# Patient Record
Sex: MALE | Race: WHITE | NOT HISPANIC OR LATINO
[De-identification: names, ages, dates, MRNs, and addresses within clinical notes are randomized per-mention and may not be internally consistent; named-entity substitution may affect disease eponyms.]

---

## 2018-12-06 ENCOUNTER — OTHER (OUTPATIENT)
Age: 68
End: 2018-12-06

## 2019-06-26 ENCOUNTER — TRANSCRIPTION ENCOUNTER (OUTPATIENT)
Age: 69
End: 2019-06-26

## 2019-06-26 ENCOUNTER — OUTPATIENT (OUTPATIENT)
Dept: OUTPATIENT SERVICES | Facility: HOSPITAL | Age: 69
LOS: 1 days | End: 2019-06-26
Payer: MEDICARE

## 2019-06-26 DIAGNOSIS — R07.89 OTHER CHEST PAIN: ICD-10-CM

## 2019-06-26 PROCEDURE — 93018 CV STRESS TEST I&R ONLY: CPT

## 2019-06-26 PROCEDURE — 93016 CV STRESS TEST SUPVJ ONLY: CPT

## 2020-07-31 ENCOUNTER — EMERGENCY (EMERGENCY)
Facility: HOSPITAL | Age: 70
LOS: 1 days | Discharge: HOME | End: 2020-07-31
Attending: EMERGENCY MEDICINE
Payer: COMMERCIAL

## 2020-07-31 VITALS
HEIGHT: 70 IN | SYSTOLIC BLOOD PRESSURE: 169 MMHG | TEMPERATURE: 97 F | HEART RATE: 54 BPM | RESPIRATION RATE: 20 BRPM | DIASTOLIC BLOOD PRESSURE: 75 MMHG | WEIGHT: 175.05 LBS | OXYGEN SATURATION: 97 %

## 2020-07-31 LAB
ALBUMIN SERPL ELPH-MCNC: 4.3 G/DL — SIGNIFICANT CHANGE UP (ref 3.3–5.2)
ALP SERPL-CCNC: 81 U/L — SIGNIFICANT CHANGE UP (ref 40–120)
ALT FLD-CCNC: 13 U/L — SIGNIFICANT CHANGE UP
ANION GAP SERPL CALC-SCNC: 11 MMOL/L — SIGNIFICANT CHANGE UP (ref 5–17)
APPEARANCE UR: CLEAR — SIGNIFICANT CHANGE UP
AST SERPL-CCNC: 18 U/L — SIGNIFICANT CHANGE UP
BASOPHILS # BLD AUTO: 0.05 K/UL — SIGNIFICANT CHANGE UP (ref 0–0.2)
BASOPHILS NFR BLD AUTO: 0.6 % — SIGNIFICANT CHANGE UP (ref 0–2)
BILIRUB SERPL-MCNC: 0.7 MG/DL — SIGNIFICANT CHANGE UP (ref 0.4–2)
BILIRUB UR-MCNC: NEGATIVE — SIGNIFICANT CHANGE UP
BUN SERPL-MCNC: 18 MG/DL — SIGNIFICANT CHANGE UP (ref 8–20)
CALCIUM SERPL-MCNC: 9.4 MG/DL — SIGNIFICANT CHANGE UP (ref 8.6–10.2)
CHLORIDE SERPL-SCNC: 104 MMOL/L — SIGNIFICANT CHANGE UP (ref 98–107)
CO2 SERPL-SCNC: 28 MMOL/L — SIGNIFICANT CHANGE UP (ref 22–29)
COLOR SPEC: YELLOW — SIGNIFICANT CHANGE UP
CREAT SERPL-MCNC: 1.05 MG/DL — SIGNIFICANT CHANGE UP (ref 0.5–1.3)
DIFF PNL FLD: NEGATIVE — SIGNIFICANT CHANGE UP
EOSINOPHIL # BLD AUTO: 0.21 K/UL — SIGNIFICANT CHANGE UP (ref 0–0.5)
EOSINOPHIL NFR BLD AUTO: 2.6 % — SIGNIFICANT CHANGE UP (ref 0–6)
ETHANOL SERPL-MCNC: <10 MG/DL — SIGNIFICANT CHANGE UP
GLUCOSE SERPL-MCNC: 114 MG/DL — HIGH (ref 70–99)
GLUCOSE UR QL: NEGATIVE MG/DL — SIGNIFICANT CHANGE UP
HCT VFR BLD CALC: 40.7 % — SIGNIFICANT CHANGE UP (ref 39–50)
HGB BLD-MCNC: 13.6 G/DL — SIGNIFICANT CHANGE UP (ref 13–17)
IMM GRANULOCYTES NFR BLD AUTO: 0.4 % — SIGNIFICANT CHANGE UP (ref 0–1.5)
KETONES UR-MCNC: NEGATIVE — SIGNIFICANT CHANGE UP
LEUKOCYTE ESTERASE UR-ACNC: NEGATIVE — SIGNIFICANT CHANGE UP
LYMPHOCYTES # BLD AUTO: 1.33 K/UL — SIGNIFICANT CHANGE UP (ref 1–3.3)
LYMPHOCYTES # BLD AUTO: 16.6 % — SIGNIFICANT CHANGE UP (ref 13–44)
MAGNESIUM SERPL-MCNC: 2.1 MG/DL — SIGNIFICANT CHANGE UP (ref 1.6–2.6)
MCHC RBC-ENTMCNC: 30.7 PG — SIGNIFICANT CHANGE UP (ref 27–34)
MCHC RBC-ENTMCNC: 33.4 GM/DL — SIGNIFICANT CHANGE UP (ref 32–36)
MCV RBC AUTO: 91.9 FL — SIGNIFICANT CHANGE UP (ref 80–100)
MONOCYTES # BLD AUTO: 0.83 K/UL — SIGNIFICANT CHANGE UP (ref 0–0.9)
MONOCYTES NFR BLD AUTO: 10.3 % — SIGNIFICANT CHANGE UP (ref 2–14)
NEUTROPHILS # BLD AUTO: 5.57 K/UL — SIGNIFICANT CHANGE UP (ref 1.8–7.4)
NEUTROPHILS NFR BLD AUTO: 69.5 % — SIGNIFICANT CHANGE UP (ref 43–77)
NITRITE UR-MCNC: NEGATIVE — SIGNIFICANT CHANGE UP
NT-PROBNP SERPL-SCNC: 146 PG/ML — SIGNIFICANT CHANGE UP (ref 0–300)
PH UR: 7 — SIGNIFICANT CHANGE UP (ref 5–8)
PLATELET # BLD AUTO: 255 K/UL — SIGNIFICANT CHANGE UP (ref 150–400)
POTASSIUM SERPL-MCNC: 4.2 MMOL/L — SIGNIFICANT CHANGE UP (ref 3.5–5.3)
POTASSIUM SERPL-SCNC: 4.2 MMOL/L — SIGNIFICANT CHANGE UP (ref 3.5–5.3)
PROT SERPL-MCNC: 6.9 G/DL — SIGNIFICANT CHANGE UP (ref 6.6–8.7)
PROT UR-MCNC: NEGATIVE MG/DL — SIGNIFICANT CHANGE UP
RBC # BLD: 4.43 M/UL — SIGNIFICANT CHANGE UP (ref 4.2–5.8)
RBC # FLD: 13 % — SIGNIFICANT CHANGE UP (ref 10.3–14.5)
SODIUM SERPL-SCNC: 142 MMOL/L — SIGNIFICANT CHANGE UP (ref 135–145)
SP GR SPEC: 1.01 — SIGNIFICANT CHANGE UP (ref 1.01–1.02)
TROPONIN T SERPL-MCNC: <0.01 NG/ML — SIGNIFICANT CHANGE UP (ref 0–0.06)
TROPONIN T SERPL-MCNC: <0.01 NG/ML — SIGNIFICANT CHANGE UP (ref 0–0.06)
TSH SERPL-MCNC: 1.59 UIU/ML — SIGNIFICANT CHANGE UP (ref 0.27–4.2)
UROBILINOGEN FLD QL: NEGATIVE MG/DL — SIGNIFICANT CHANGE UP
WBC # BLD: 8.02 K/UL — SIGNIFICANT CHANGE UP (ref 3.8–10.5)
WBC # FLD AUTO: 8.02 K/UL — SIGNIFICANT CHANGE UP (ref 3.8–10.5)

## 2020-07-31 PROCEDURE — 99220: CPT

## 2020-07-31 PROCEDURE — 93010 ELECTROCARDIOGRAM REPORT: CPT | Mod: 76

## 2020-07-31 PROCEDURE — 70450 CT HEAD/BRAIN W/O DYE: CPT | Mod: 26

## 2020-07-31 PROCEDURE — 71045 X-RAY EXAM CHEST 1 VIEW: CPT | Mod: 26

## 2020-07-31 RX ORDER — TAMSULOSIN HYDROCHLORIDE 0.4 MG/1
0.4 CAPSULE ORAL AT BEDTIME
Refills: 0 | Status: DISCONTINUED | OUTPATIENT
Start: 2020-07-31 | End: 2020-08-05

## 2020-07-31 RX ORDER — AMLODIPINE BESYLATE 2.5 MG/1
5 TABLET ORAL ONCE
Refills: 0 | Status: COMPLETED | OUTPATIENT
Start: 2020-07-31 | End: 2020-07-31

## 2020-07-31 RX ORDER — ASPIRIN/CALCIUM CARB/MAGNESIUM 324 MG
81 TABLET ORAL DAILY
Refills: 0 | Status: DISCONTINUED | OUTPATIENT
Start: 2020-07-31 | End: 2020-08-05

## 2020-07-31 RX ORDER — ATORVASTATIN CALCIUM 80 MG/1
10 TABLET, FILM COATED ORAL AT BEDTIME
Refills: 0 | Status: DISCONTINUED | OUTPATIENT
Start: 2020-07-31 | End: 2020-08-05

## 2020-07-31 RX ORDER — ACETAMINOPHEN 500 MG
650 TABLET ORAL EVERY 6 HOURS
Refills: 0 | Status: DISCONTINUED | OUTPATIENT
Start: 2020-07-31 | End: 2020-08-05

## 2020-07-31 RX ORDER — METOPROLOL TARTRATE 50 MG
50 TABLET ORAL DAILY
Refills: 0 | Status: DISCONTINUED | OUTPATIENT
Start: 2020-07-31 | End: 2020-08-05

## 2020-07-31 RX ORDER — SODIUM CHLORIDE 9 MG/ML
1000 INJECTION INTRAMUSCULAR; INTRAVENOUS; SUBCUTANEOUS ONCE
Refills: 0 | Status: COMPLETED | OUTPATIENT
Start: 2020-07-31 | End: 2020-07-31

## 2020-07-31 RX ORDER — AMLODIPINE BESYLATE 2.5 MG/1
5 TABLET ORAL DAILY
Refills: 0 | Status: DISCONTINUED | OUTPATIENT
Start: 2020-07-31 | End: 2020-08-05

## 2020-07-31 RX ADMIN — ATORVASTATIN CALCIUM 10 MILLIGRAM(S): 80 TABLET, FILM COATED ORAL at 22:14

## 2020-07-31 RX ADMIN — AMLODIPINE BESYLATE 5 MILLIGRAM(S): 2.5 TABLET ORAL at 21:03

## 2020-07-31 RX ADMIN — SODIUM CHLORIDE 1000 MILLILITER(S): 9 INJECTION INTRAMUSCULAR; INTRAVENOUS; SUBCUTANEOUS at 19:47

## 2020-07-31 NOTE — ED CDU PROVIDER INITIAL DAY NOTE - ATTENDING CONTRIBUTION TO CARE
69 YOM HTN, HLD presented to ED c/o lightheadedness sent by urgent care. Reports was going to stand up and would feel light headed. Was also taking his BP yesterday, and machine kept telling him he had irregular heart rate. Went to  today and sent to ED for abnormal EKG. Asymptomatic now. Multiple PVCs. CDU for telemonitoring. [employer] cards to see him

## 2020-07-31 NOTE — ED PROVIDER NOTE - PHYSICAL EXAMINATION
CONSTITUTIONAL: Well-developed; well-nourished; in no acute distress. Sitting up and providing appropriate history and physical examination  SKIN: skin exam is warm and dry, no acute rash.  HEAD: Normocephalic; atraumatic.  ENT: No nasal discharge; airway clear.  NECK: Supple; non tender. + full passive ROM in all directions. No JVD  CARD: S1, S2 normal; no murmurs, gallops, or rubs. Regular rate and rhythm. + Symmetric Strong Pulses  RESP: No wheezes, rales or rhonchi. Good air movement bilaterally  ABD: soft; non-distended; non-tender. No Rebound, No Guarding,   EXT: Normal ROM. No clubbing, cyanosis or edema. Dp and Pt Pulses intact.  NEURO: CN 2-12 intact, normal finger to nose, stable gait, no sensory or motor deficits, Alert, oriented, grossly unremarkable. No Focal deficits. GCS 15. NIH 0  PSYCH: Cooperative, appropriate.

## 2020-07-31 NOTE — ED PROVIDER NOTE - CARE PLAN
Principal Discharge DX:	Palpitations  Secondary Diagnosis:	HTN (hypertension)  Secondary Diagnosis:	Hypercholesterolemia

## 2020-07-31 NOTE — ED PROVIDER NOTE - CLINICAL SUMMARY MEDICAL DECISION MAKING FREE TEXT BOX
68 yo male with pmh of htn, hld presents complaining of dizziness. Lightheaded since yesterday. Neuro exam normal. EKG at PCP was abnormal. CT head, cbc, cmp, tsh, BAL.

## 2020-07-31 NOTE — ED PROVIDER NOTE - OBJECTIVE STATEMENT
69 year old male with PMH of HTN, HLD, BPH presents complaining of dizziness and an abnormal ekg that was performed at his primary care physician's office. Patient reports standing yesterday and feeling lightheaded, then "not feeling right" all day today. Denies room spinning. Denies headache, loss of balance, chest pain, SOB, muscle weakness.

## 2020-07-31 NOTE — ED CDU PROVIDER INITIAL DAY NOTE - PHYSICAL EXAMINATION
Gen: WD/WN, NAD, non-toxic  Head: NCAT  Eyes: PERRL, EOMI  Neck:. Soft and supple. FROM, no midline ttp  Cardiac: Irregular rhythm, occasional skipped beats. +S1S2.   Resp: CTAB  Abd: +BS x 4. Soft, non-tender, non-distended. No guarding or rebound.  MSK: FROM extremities x 4, no bony ttp  Skin: Warm, dry  Neuro: Awake, alert & oriented x 3. No focal deficits, ambulatory with steady gait

## 2020-07-31 NOTE — ED ADULT NURSE NOTE - CHPI ED NUR SYMPTOMS NEG
no diaphoresis/no chest pain/no back pain/no congestion/no fever/no vomiting/no shortness of breath/no syncope/no nausea/no chills

## 2020-07-31 NOTE — ED ADULT NURSE NOTE - PMH
BPH (Benign Prostatic Hyperplasia)    GERD (gastroesophageal reflux disease)    HTN (hypertension)    Hypercholesterolemia

## 2020-07-31 NOTE — ED ADULT NURSE NOTE - OBJECTIVE STATEMENT
pt presents to ed a&ox3, no acute distress, breaths even and unlabored. states that yesterday he went to get up out of a chair when he had a 1min long episode of lightheadedness, dizziness, and feeling faint so he sat back down. since yesterday he has felt "just not right, not myself". denies chest pain, sob, dizziness, diff breathing, headache, palpations, vision changes. pt reports that he took his BP at home and the monitor said "irregular heartbeat". denies having a cardiologist or cardiac hx.

## 2020-07-31 NOTE — ED PROVIDER NOTE - NS ED ROS FT
Constitutional: See HPI.  Eyes: No visual changes  ENMT: No hearing changes, pain. No neck pain or stiffness. No limited ROM  Cardiac: No SOB or edema. No chest pain with exertion.  Respiratory: No cough or respiratory distress  GI: No nausea, vomiting, diarrhea or abdominal pain.  : No dysuria, frequency or burning.   MS: No myalgia, muscle weakness, joint pain or back pain.  Neuro: No headache or weakness. No LOC.  Except as documented in the HPI, all other systems are negative.

## 2020-07-31 NOTE — ED ADULT TRIAGE NOTE - SPO2 (%)
Conducted EOT Harvoni QoL assessment with patient.  Name and  confirmed.  Patient reports feeling better after completing  Harvoni.  She did not experience any symptoms of HCV in the past few months.  Patient is able to do normal daily activities with limitations due to shoulder and back pain.  Patient counseled on importance of maintaining appointments and labwork.  Advised to call [facility] and provider if any issues arise.   
97

## 2020-07-31 NOTE — ED CDU PROVIDER INITIAL DAY NOTE - OBJECTIVE STATEMENT
70yo M pmhx HTN, HLD presented to ED c/o lightheadedness. Pt states yesterday he was sitting outside with friends for a few hours and when he went to stand up he felt a "rush" sensation to his head. States the symptom completely resolved and he did not think much of it rest of day. Today pt states he felt "off" but could not describe why, just did not feel right. Took his BP at home several times 70yo M pmhx HTN, HLD presented to ED c/o lightheadedness. Pt states yesterday he was sitting outside with friends for a few hours and when he went to stand up he felt a "rush" sensation to his head. States the symptom completely resolved and he did not think much of it rest of day. Today pt states he felt "off" but could not describe why, just did not feel right. Took his BP at home several times and while BP was normotensive, machine kept reading "irregular HR". Pt states this has never happened. Went to [facility] physicians today and had EKG with PVCs was told to come to ED. Pt stating he feels well at this time, no longer lightheaded. Workup in ED unremarkable, trops negative x 2. Denies fever, chills, cp, sob, syncope, n/v/d, abdominal pain.  Has seen [facility] in past but does not follow regularly. States he has referral for his wife's cardiologist Dr. Moore in a few weeks.

## 2020-07-31 NOTE — ED ADULT TRIAGE NOTE - CHIEF COMPLAINT QUOTE
Patient arrived to ED today after having an abnormal EKG at MD office [place].  Patient states at times he has dizziness when getting up from sitting or lying.

## 2020-07-31 NOTE — ED PROVIDER NOTE - ATTENDING CONTRIBUTION TO CARE
The patient seen and examined    Lightheadedness  Palpitations    I, Theodore Hicks, performed the initial face to face bedside interview with this patient regarding history of present illness, review of symptoms and relevant past medical, social and family history.  I completed an independent physical examination.  I was the initial provider who evaluated this patient. I have signed out the follow up of any pending tests (i.e. labs, radiological studies) to the resident.  I have communicated the patient’s plan of care and disposition with the resident.

## 2020-08-01 VITALS
HEART RATE: 67 BPM | SYSTOLIC BLOOD PRESSURE: 175 MMHG | OXYGEN SATURATION: 98 % | DIASTOLIC BLOOD PRESSURE: 66 MMHG | TEMPERATURE: 98 F | RESPIRATION RATE: 20 BRPM

## 2020-08-01 LAB — TROPONIN T SERPL-MCNC: <0.01 NG/ML — SIGNIFICANT CHANGE UP (ref 0–0.06)

## 2020-08-01 PROCEDURE — 93010 ELECTROCARDIOGRAM REPORT: CPT

## 2020-08-01 PROCEDURE — 99284 EMERGENCY DEPT VISIT MOD MDM: CPT | Mod: 25

## 2020-08-01 PROCEDURE — 80307 DRUG TEST PRSMV CHEM ANLYZR: CPT

## 2020-08-01 PROCEDURE — 83880 ASSAY OF NATRIURETIC PEPTIDE: CPT

## 2020-08-01 PROCEDURE — 83735 ASSAY OF MAGNESIUM: CPT

## 2020-08-01 PROCEDURE — 36415 COLL VENOUS BLD VENIPUNCTURE: CPT

## 2020-08-01 PROCEDURE — 82962 GLUCOSE BLOOD TEST: CPT

## 2020-08-01 PROCEDURE — 85027 COMPLETE CBC AUTOMATED: CPT

## 2020-08-01 PROCEDURE — 81003 URINALYSIS AUTO W/O SCOPE: CPT

## 2020-08-01 PROCEDURE — 71045 X-RAY EXAM CHEST 1 VIEW: CPT

## 2020-08-01 PROCEDURE — G0378: CPT

## 2020-08-01 PROCEDURE — 70450 CT HEAD/BRAIN W/O DYE: CPT

## 2020-08-01 PROCEDURE — 84484 ASSAY OF TROPONIN QUANT: CPT

## 2020-08-01 PROCEDURE — 84443 ASSAY THYROID STIM HORMONE: CPT

## 2020-08-01 PROCEDURE — 80053 COMPREHEN METABOLIC PANEL: CPT

## 2020-08-01 PROCEDURE — 99217: CPT

## 2020-08-01 PROCEDURE — 93005 ELECTROCARDIOGRAM TRACING: CPT

## 2020-08-01 RX ADMIN — Medication 50 MILLIGRAM(S): at 05:28

## 2020-08-01 RX ADMIN — Medication 81 MILLIGRAM(S): at 11:27

## 2020-08-01 RX ADMIN — AMLODIPINE BESYLATE 5 MILLIGRAM(S): 2.5 TABLET ORAL at 05:28

## 2020-08-01 NOTE — ED CDU PROVIDER DISPOSITION NOTE - ATTENDING CONTRIBUTION TO CARE
I agree with the PA's note and was available for any issues/concerns. I was directly involved in patient care. My brief overall assessment is as follows: pt feeling much better, seen and cleared by cards for f/u. pt requesting d/c. infrequent pvcs. return precautions, f/u cards.

## 2020-08-01 NOTE — CONSULT NOTE ADULT - SUBJECTIVE AND OBJECTIVE BOX
[place] [facility], [facility]                                   [address] [place] [address]                                                      PHONE: [phone]                                                         FAX: [phone]  [email or web address]  ---------------------------------------------------------------------------------------------------------------------------------    Reason for Consult: ABN EKG DIZZINESS    HPI:  NAVI UPTON is an 69y Male Referred by primary doctor after he was found to have an abnormal EKG at his PMD office. His EKG confirms sinus sinus rhythm with a right bundle branch block and frequent PVCs pain with lightheadedness and dizziness underwent a CT of the brain that was negative, 2 sets of cardiac markers were also found to be negative he denies any chest pain or shortness of breath there is no history of CAD or CVA past medical history significant for hypertension hypercholesterolemia    PAST MEDICAL & SURGICAL HISTORY:  Hypercholesterolemia  GERD (gastroesophageal reflux disease)  BPH (Benign Prostatic Hyperplasia)  HTN (hypertension)  S/P inguinal hernia repair  S/P cholecystectomy      azithromycin (Other)  crabs (Hives)  Flomax (Faint)  penicillin (Hives)      MEDICATIONS  (STANDING):  amLODIPine   Tablet 5 milliGRAM(s) Oral daily  aspirin  chewable 81 milliGRAM(s) Oral daily  atorvastatin 10 milliGRAM(s) Oral at bedtime  metoprolol succinate ER 50 milliGRAM(s) Oral daily  tamsulosin 0.4 milliGRAM(s) Oral at bedtime    MEDICATIONS  (PRN):  acetaminophen   Tablet .. 650 milliGRAM(s) Oral every 6 hours PRN Temp greater or equal to 38C (100.4F), Mild Pain (1 - 3)      Social History:  Cigarettes:                    Alchohol:                 Illicit Drug Abuse:      REVIEW OF SYSTEMS:    Constitutional: No fever, weight loss or fatigue  Eyes: No eye pain, visual disturbances, or discharge  ENMT:  No difficulty hearing, tinnitus, vertigo; No sinus or throat pain  Neck: No pain or stiffness  Respiratory: No cough, wheezing, chills or hemoptysis  Cardiovascular: No chest pain, palpitations, shortness of breath, dizziness or leg swelling  Gastrointestinal: No abdominal or epigastric pain. No nausea, vomiting or hematemesis; No diarrhea or constipation. No melena or hematochezia.  Genitourinary: No dysuria, frequency, hematuria or incontinence  Rectal: No pain, hemorrhoids or incontinence  Neurological: No headaches, memory loss, loss of strength, numbness or tremors  Skin: No itching, burning, rashes or lesions   Lymph Nodes: No enlarged glands  Endocrine: No heat or cold intolerance; No hair loss  Musculoskeletal: No joint pain or swelling; No muscle, back or extremity pain  Psychiatric: No depression, anxiety, mood swings or difficulty sleeping  Heme/Lymph: No easy bruising or bleeding gums  Allergy and Immunologic: No hives or eczema    Vital Signs Last 24 Hrs  T(C): 36.6 (01 Aug 2020 11:14), Max: 36.8 (01 Aug 2020 00:16)  T(F): 97.8 (01 Aug 2020 11:14), Max: 98.2 (01 Aug 2020 00:16)  HR: 67 (01 Aug 2020 11:14) (54 - 74)  BP: 175/66 (01 Aug 2020 11:14) (129/75 - 175/66)  BP(mean): --  RR: 20 (01 Aug 2020 11:14) (18 - 20)  SpO2: 98% (01 Aug 2020 11:14) (97% - 98%)  ICU Vital Signs Last 24 Hrs  NAVI FORDOSSO  I&O's Detail    I&O's Summary    Drug Dosing Weight  NAVI UPTON      PHYSICAL EXAM:  General: Appears well developed, well nourished alert and cooperative.  HEENT: Head; normocephalic, atraumatic.  Eyes: Pupils reactive, cornea wnl.  Neck: Supple, no nodes adenopathy, no NVD or carotid bruit or thyromegaly.  CARDIOVASCULAR: Normal S1 and S2, No murmur, rub, gallop or lift.   LUNGS: No rales, rhonchi or wheeze. Normal breath sounds bilaterally.  ABDOMEN: Soft, nontender without mass or organomegaly. bowel sounds normoactive.  EXTREMITIES: No clubbing, cyanosis or edema. Distal pulses wnl.   SKIN: warm and dry with normal turgor.  NEURO: Alert/oriented x 3/normal motor exam. No pathologic reflexes.    PSYCH: normal affect.        LABS:                        13.6   8.02  )-----------( 255      ( 2020 19:08 )             40.7         142  |  104  |  18.0  ----------------------------<  114<H>  4.2   |  28.0  |  1.05    Ca    9.4      2020 19:08  Mg     2.1         TPro  6.9  /  Alb  4.3  /  TBili  0.7  /  DBili  x   /  AST  18  /  ALT  13  /  AlkPhos  81      NAVI MUCCIGROSSO  CARDIAC MARKERS ( 01 Aug 2020 02:11 )  x     / <0.01 ng/mL / x     / x     / x      CARDIAC MARKERS ( 2020 22:27 )  x     / <0.01 ng/mL / x     / x     / x      CARDIAC MARKERS ( 2020 19:08 )  x     / <0.01 ng/mL / x     / x     / x            Urinalysis Basic - ( 2020 20:38 )    Color: Yellow / Appearance: Clear / S.010 / pH: x  Gluc: x / Ketone: Negative  / Bili: Negative / Urobili: Negative mg/dL   Blood: x / Protein: Negative mg/dL / Nitrite: Negative   Leuk Esterase: Negative / RBC: x / WBC x   Sq Epi: x / Non Sq Epi: x / Bacteria: x        RADIOLOGY & ADDITIONAL STUDIES:    INTERPRETATION OF TELEMETRY (personally reviewed):    ECG: Normal sinus rhythm right bundle branch block PVC    ECHO: < from: TTE Echo w/Cont Complete (16 @ 10:46) >  PHYSICIAN INTERPRETATION:  Left Ventricle: Grossly normal LV cavity size and wall thickness. There   is discrete upper septal thickening present without evidence of LVOT   obstruction. Global LV systolic function was normal. Left ventricular   ejection fraction, by visual estimation, is 65-70%. Spectral Doppler   shows impaired relaxation pattern of left ventricular myocardial filling   (Grade I diastolic dysfunction).  Right Ventricle: Normal right ventricular size and function.  Left Atrium: The left atrium is normal in size.  Right Atrium: The right atrium is normal in size.  Pericardium: There is no evidence of pericardial effusion. A prominent   fat pad is present.  Mitral Valve: Structurally normal mitral valve, with normal leaflet   excursion. No mitral stenosis. Trace mitral valve regurgitation is seen.  Tricuspid Valve: The tricuspid valve is normal in structure. Trivial   tricuspid regurgitation is visualized. Incomplete TR jet envelope   precludes estimation of PA systolic pressure.  Aortic Valve: Normal trileaflet aortic valve with normal opening. No   aortic stenosis. No evidence of aortic valve regurgitation is seen.  Pulmonic Valve: Structurally normal pulmonic valve, with normal leaflet   excursion. Mild pulmonic valve regurgitation.  Aorta: The aortic root and ascending aorta are structurally normal, with   no evidence of dilitation. The aortic arch is not well visualized.  Pulmonary Artery: The main pulmonary artery is normal in size.        Summary:   1. Normal biventricular systolic function. Estimated LVEF = 65-70%.   2. Spectral Doppler shows impaired relaxation pattern of left   ventricular myocardial filling (Grade I diastolic dysfunction).   3. No significant valvular abnormality.   4. There is no evidence of pericardial effusion.   5. ** No prior echocardiograms available for comparison.      < end of copied text >      STRESS TEST:    CARDIAC CATHETERIZATION:    ACTIVE PROBLEMS:  HEALTH ISSUES - PROBLEM Dx:          Assessment and Plan:    In summary,     NAVI UPTON is an 69y Male Referred by primary doctor after he was found to have an abnormal EKG at his PMD office. His EKG confirms sinus sinus rhythm with a right bundle branch block and frequent PVCs pain with lightheadedness and dizziness underwent a CT of the brain that was negative, 2 sets of cardiac markers were also found to be negative he denies any chest pain or shortness of breath there is no history of CAD or CVA past medical history significant for hypertension hypercholesterolemia    No evidence of chest pain at this time will obtain an outpatient cardiac stress test echo and Holter monitor Monday at [facility].  Symptoms may have been related to dehydration  Continue present cardiac therapy    KAILEY AKERS MD, [facility], [place] [place] [facility], [facility]                                   [address] [place] [address]                                                      PHONE: [phone]                                                         FAX: [phone]  [email or web address]  ---------------------------------------------------------------------------------------------------------------------------------    Reason for Consult: ABN EKG DIZZINESS    HPI:  NAVI UPTON is an 69y Male Referred by primary doctor after he was found to have an abnormal EKG at his PMD office. His EKG confirms sinus sinus rhythm with a right bundle branch block and frequent PVCs pain with lightheadedness and dizziness underwent a CT of the brain that was negative, 2 sets of cardiac markers were also found to be negative he denies any chest pain or shortness of breath there is no history of CAD or CVA past medical history significant for hypertension hypercholesterolemia    PAST MEDICAL & SURGICAL HISTORY:  Hypercholesterolemia  GERD (gastroesophageal reflux disease)  BPH (Benign Prostatic Hyperplasia)  HTN (hypertension)  S/P inguinal hernia repair  S/P cholecystectomy      azithromycin (Other)  crabs (Hives)  Flomax (Faint)  penicillin (Hives)      MEDICATIONS  (STANDING):  amLODIPine   Tablet 5 milliGRAM(s) Oral daily  aspirin  chewable 81 milliGRAM(s) Oral daily  atorvastatin 10 milliGRAM(s) Oral at bedtime  metoprolol succinate ER 50 milliGRAM(s) Oral daily  tamsulosin 0.4 milliGRAM(s) Oral at bedtime    MEDICATIONS  (PRN):  acetaminophen   Tablet .. 650 milliGRAM(s) Oral every 6 hours PRN Temp greater or equal to 38C (100.4F), Mild Pain (1 - 3)      Social History:  Cigarettes:                    Alchohol:                 Illicit Drug Abuse:      REVIEW OF SYSTEMS:    Constitutional: No fever, weight loss or fatigue  Eyes: No eye pain, visual disturbances, or discharge  ENMT:  No difficulty hearing, tinnitus, vertigo; No sinus or throat pain  Neck: No pain or stiffness  Respiratory: No cough, wheezing, chills or hemoptysis  Cardiovascular: No chest pain, palpitations, shortness of breath, dizziness or leg swelling  Gastrointestinal: No abdominal or epigastric pain. No nausea, vomiting or hematemesis; No diarrhea or constipation. No melena or hematochezia.  Genitourinary: No dysuria, frequency, hematuria or incontinence  Rectal: No pain, hemorrhoids or incontinence  Neurological: No headaches, memory loss, loss of strength, numbness or tremors  Skin: No itching, burning, rashes or lesions   Lymph Nodes: No enlarged glands  Endocrine: No heat or cold intolerance; No hair loss  Musculoskeletal: No joint pain or swelling; No muscle, back or extremity pain  Psychiatric: No depression, anxiety, mood swings or difficulty sleeping  Heme/Lymph: No easy bruising or bleeding gums  Allergy and Immunologic: No hives or eczema    Vital Signs Last 24 Hrs  T(C): 36.6 (01 Aug 2020 11:14), Max: 36.8 (01 Aug 2020 00:16)  T(F): 97.8 (01 Aug 2020 11:14), Max: 98.2 (01 Aug 2020 00:16)  HR: 67 (01 Aug 2020 11:14) (54 - 74)  BP: 175/66 (01 Aug 2020 11:14) (129/75 - 175/66)  BP(mean): --  RR: 20 (01 Aug 2020 11:14) (18 - 20)  SpO2: 98% (01 Aug 2020 11:14) (97% - 98%)  ICU Vital Signs Last 24 Hrs  NAVI FORDOSSO  I&O's Detail    I&O's Summary    Drug Dosing Weight  NAVI UPTON      PHYSICAL EXAM:  General: Appears well developed, well nourished alert and cooperative.  HEENT: Head; normocephalic, atraumatic.  Eyes: Pupils reactive, cornea wnl.  Neck: Supple, no nodes adenopathy, no NVD or carotid bruit or thyromegaly.  CARDIOVASCULAR: Normal S1 and S2, No murmur, rub, gallop or lift.   LUNGS: No rales, rhonchi or wheeze. Normal breath sounds bilaterally.  ABDOMEN: Soft, nontender without mass or organomegaly. bowel sounds normoactive.  EXTREMITIES: No clubbing, cyanosis or edema. Distal pulses wnl.   SKIN: warm and dry with normal turgor.  NEURO: Alert/oriented x 3/normal motor exam. No pathologic reflexes.    PSYCH: normal affect.        LABS:                        13.6   8.02  )-----------( 255      ( 2020 19:08 )             40.7         142  |  104  |  18.0  ----------------------------<  114<H>  4.2   |  28.0  |  1.05    Ca    9.4      2020 19:08  Mg     2.1         TPro  6.9  /  Alb  4.3  /  TBili  0.7  /  DBili  x   /  AST  18  /  ALT  13  /  AlkPhos  81      NAVI MUCCIGROSSO  CARDIAC MARKERS ( 01 Aug 2020 02:11 )  x     / <0.01 ng/mL / x     / x     / x      CARDIAC MARKERS ( 2020 22:27 )  x     / <0.01 ng/mL / x     / x     / x      CARDIAC MARKERS ( 2020 19:08 )  x     / <0.01 ng/mL / x     / x     / x            Urinalysis Basic - ( 2020 20:38 )    Color: Yellow / Appearance: Clear / S.010 / pH: x  Gluc: x / Ketone: Negative  / Bili: Negative / Urobili: Negative mg/dL   Blood: x / Protein: Negative mg/dL / Nitrite: Negative   Leuk Esterase: Negative / RBC: x / WBC x   Sq Epi: x / Non Sq Epi: x / Bacteria: x        RADIOLOGY & ADDITIONAL STUDIES:    INTERPRETATION OF TELEMETRY (personally reviewed):    ECG: Normal sinus rhythm right bundle branch block PVC    ECHO: < from: TTE Echo w/Cont Complete (16 @ 10:46) >  PHYSICIAN INTERPRETATION:  Left Ventricle: Grossly normal LV cavity size and wall thickness. There   is discrete upper septal thickening present without evidence of LVOT   obstruction. Global LV systolic function was normal. Left ventricular   ejection fraction, by visual estimation, is 65-70%. Spectral Doppler   shows impaired relaxation pattern of left ventricular myocardial filling   (Grade I diastolic dysfunction).  Right Ventricle: Normal right ventricular size and function.  Left Atrium: The left atrium is normal in size.  Right Atrium: The right atrium is normal in size.  Pericardium: There is no evidence of pericardial effusion. A prominent   fat pad is present.  Mitral Valve: Structurally normal mitral valve, with normal leaflet   excursion. No mitral stenosis. Trace mitral valve regurgitation is seen.  Tricuspid Valve: The tricuspid valve is normal in structure. Trivial   tricuspid regurgitation is visualized. Incomplete TR jet envelope   precludes estimation of PA systolic pressure.  Aortic Valve: Normal trileaflet aortic valve with normal opening. No   aortic stenosis. No evidence of aortic valve regurgitation is seen.  Pulmonic Valve: Structurally normal pulmonic valve, with normal leaflet   excursion. Mild pulmonic valve regurgitation.  Aorta: The aortic root and ascending aorta are structurally normal, with   no evidence of dilitation. The aortic arch is not well visualized.  Pulmonary Artery: The main pulmonary artery is normal in size.        Summary:   1. Normal biventricular systolic function. Estimated LVEF = 65-70%.   2. Spectral Doppler shows impaired relaxation pattern of left   ventricular myocardial filling (Grade I diastolic dysfunction).   3. No significant valvular abnormality.   4. There is no evidence of pericardial effusion.   5. ** No prior echocardiograms available for comparison.      < end of copied text >      STRESS TEST:    CARDIAC CATHETERIZATION:    ACTIVE PROBLEMS:  HEALTH ISSUES - PROBLEM Dx:          Assessment and Plan:    In summary,    NAVI UPTON is an 69y Male Referred by primary doctor after he was found to have an abnormal EKG at his PMD office. His EKG confirms sinus sinus rhythm with a right bundle branch block and frequent PVCs pain with lightheadedness and dizziness underwent a CT of the brain that was negative, 2 sets of cardiac markers were also found to be negative he denies any chest pain or shortness of breath there is no history of CAD or CVA past medical history significant for hypertension hypercholesterolemia    No evidence of chest pain at this time will obtain an outpatient cardiac stress test echo and Holter monitor Monday at [facility].  Symptoms may have been related to dehydration  Continue present cardiac therapy including ASA 81 mg po daily    KAILEY AKERS MD, FACC, [facility]

## 2020-08-01 NOTE — ED ADULT NURSE REASSESSMENT NOTE - NS ED NURSE REASSESS COMMENT FT1
Denies palpitations, shortness of breath or chest pain.
Report given to obs RN Vannessa. Patient medicated, moved to CDU 8 and placed on cardiac monitor at this time. Oriented to unit, plan of care reviewed with pt. VSS. No s/s of distress noted.
Report received from off-going RN at 19:30, VSS, pt resting comfortably in stretcher with cardiac monitor in place. No s/s of apparent distress noted, denies any complaints at this time. IVF started as ordered and plan of care reviewed with patient, will continue to monitor at this time.
pt received from ED RN alert and oriented x4. Vital Signs Stable. Denies pain/CP. In NSR with occasional PVC's on cardiac monitor. IV patent and locked. Labs sent. NPO since 12AM for potential test in AM. Plan of care discussed with patient. Safety maintained

## 2020-08-01 NOTE — ED CDU PROVIDER DISPOSITION NOTE - CLINICAL COURSE
68 y/o male placed in observation  with palpitations / lightheadedness with PVC' noted on EKG. serial trop negative. Evaluated by cardio and will have outpatient work up this Monday in the office. PT is asymptomatic during observation stay. Results discussed. ED return precautions discussed.

## 2020-08-01 NOTE — ED CDU PROVIDER DISPOSITION NOTE - CARE PROVIDER_API CALL
Tejas Del Castillo  CARDIOVASCULAR DISEASE  [address] [place] [address].  [place] [address]  Phone: [phone]  Fax: [phone]  Follow Up Time:

## 2020-08-01 NOTE — ED CDU PROVIDER SUBSEQUENT DAY NOTE - NS ED ROS FT
Gen: denies fever, chills, fatigue, weight loss  Skin: denies rashes, laceration, bruising  HEENT: denies visual changes, ear pain, nasal congestion, throat pain  Respiratory: denies LYON, SOB, cough, wheezing  Cardiovascular: denies chest pain, palpitations, diaphoresis, LE edema  GI: denies abdominal pain, n/v/d  : denies dysuria, frequency, urgency, bowel/bladder incontinence  MSK: denies joint swelling/pain, back pain, neck pain  Neuro: denies headache, dizziness, weakness, numbness  Psych: denies anxiety, depression, SI/HI, visual/auditory hallucinations
unknown

## 2020-08-01 NOTE — ED CDU PROVIDER SUBSEQUENT DAY NOTE - HISTORY
68yo M with lightheadedness, pvcs on ekg. Feeling improved at this time, resting comfortably. Will continue to monitor. SB Cards to see in AM

## 2020-08-01 NOTE — ED CDU PROVIDER DISPOSITION NOTE - PATIENT PORTAL LINK FT
You can access the FollowMyHealth Patient Portal offered by [facility] by registering at the following website: [email or web address]. By joining [employer]’s FollowMyHealth portal, you will also be able to view your health information using other applications (apps) compatible with our system.

## 2020-08-01 NOTE — ED CDU PROVIDER DISPOSITION NOTE - NSFOLLOWUPINSTRUCTIONS_ED_ALL_ED_FT
Palpitations    A palpitation is the feeling that your heartbeat is irregular or is faster than normal. It may feel like your heart is fluttering or skipping a beat. They may be caused by many things, including smoking, caffeine, alcohol, stress, and certain medicines. Although most causes of palpitations are not serious, palpitations can be a sign of a serious medical problem. Avoid caffeine, alcohol, and tobacco products at home. Try to reduce stress and anxiety and make sure to get plenty of rest.     SEEK IMMEDIATE MEDICAL CARE IF YOU HAVE ANY OF THE FOLLOWING SYMPTOMS: chest pain, shortness of breath, severe headache, dizziness/lightheadedness, or fainting.     Please follow up with cardiology this Monday.

## 2020-08-01 NOTE — ED CDU PROVIDER SUBSEQUENT DAY NOTE - PHYSICAL EXAMINATION
Gen: WD/WN, NAD, non-toxic  Head: NCAT  Eyes: PERRL, EOMI  Neck:. Soft and supple. FROM, no midline ttp  Cardiac: Irregular rhythm +S1S2.   Resp: CTAB  Abd: +BS x 4. Soft, non-tender, non-distended. No guarding or rebound.  MSK: FROM extremities x 4, no bony ttp  Skin: Warm, dry  Neuro: Awake, alert & oriented x 3. No focal deficits, ambulatory with steady gait

## 2020-08-01 NOTE — ED ADULT NURSE REASSESSMENT NOTE - COMFORT CARE
plan of care explained/ambulated to bathroom
po fluids offered/repositioned/warm blanket provided/side rails up/plan of care explained/assisted to bathroom

## 2020-08-01 NOTE — ED CDU PROVIDER SUBSEQUENT DAY NOTE - ATTENDING CONTRIBUTION TO CARE
I agree with the PA's note and was available for any issues/concerns. I was directly involved in patient care. My brief overall assessment is as follows: dizziness/pvcs yesterday, improved, infrequent pvcs and feeling well currently. pending Webb cards. no complaints and requesting dc.

## 2020-08-05 ENCOUNTER — EMERGENCY (EMERGENCY)
Facility: HOSPITAL | Age: 70
LOS: 1 days | End: 2020-08-05
Attending: EMERGENCY MEDICINE
Payer: COMMERCIAL

## 2020-08-05 VITALS
HEIGHT: 70 IN | HEART RATE: 67 BPM | TEMPERATURE: 97 F | DIASTOLIC BLOOD PRESSURE: 85 MMHG | WEIGHT: 175.05 LBS | OXYGEN SATURATION: 100 % | SYSTOLIC BLOOD PRESSURE: 133 MMHG | RESPIRATION RATE: 18 BRPM

## 2020-08-05 VITALS
DIASTOLIC BLOOD PRESSURE: 80 MMHG | HEART RATE: 68 BPM | TEMPERATURE: 98 F | OXYGEN SATURATION: 99 % | SYSTOLIC BLOOD PRESSURE: 132 MMHG | RESPIRATION RATE: 18 BRPM

## 2020-08-05 LAB
ANION GAP SERPL CALC-SCNC: 9 MMOL/L — SIGNIFICANT CHANGE UP (ref 5–17)
BASOPHILS # BLD AUTO: 0.05 K/UL — SIGNIFICANT CHANGE UP (ref 0–0.2)
BASOPHILS NFR BLD AUTO: 0.7 % — SIGNIFICANT CHANGE UP (ref 0–2)
BUN SERPL-MCNC: 20 MG/DL — SIGNIFICANT CHANGE UP (ref 8–20)
CALCIUM SERPL-MCNC: 9.5 MG/DL — SIGNIFICANT CHANGE UP (ref 8.6–10.2)
CHLORIDE SERPL-SCNC: 100 MMOL/L — SIGNIFICANT CHANGE UP (ref 98–107)
CO2 SERPL-SCNC: 30 MMOL/L — HIGH (ref 22–29)
CREAT SERPL-MCNC: 1.07 MG/DL — SIGNIFICANT CHANGE UP (ref 0.5–1.3)
EOSINOPHIL # BLD AUTO: 0.11 K/UL — SIGNIFICANT CHANGE UP (ref 0–0.5)
EOSINOPHIL NFR BLD AUTO: 1.5 % — SIGNIFICANT CHANGE UP (ref 0–6)
GLUCOSE SERPL-MCNC: 113 MG/DL — HIGH (ref 70–99)
HCT VFR BLD CALC: 41.2 % — SIGNIFICANT CHANGE UP (ref 39–50)
HGB BLD-MCNC: 13.5 G/DL — SIGNIFICANT CHANGE UP (ref 13–17)
IMM GRANULOCYTES NFR BLD AUTO: 0.4 % — SIGNIFICANT CHANGE UP (ref 0–1.5)
LYMPHOCYTES # BLD AUTO: 0.98 K/UL — LOW (ref 1–3.3)
LYMPHOCYTES # BLD AUTO: 13.6 % — SIGNIFICANT CHANGE UP (ref 13–44)
MCHC RBC-ENTMCNC: 30.5 PG — SIGNIFICANT CHANGE UP (ref 27–34)
MCHC RBC-ENTMCNC: 32.8 GM/DL — SIGNIFICANT CHANGE UP (ref 32–36)
MCV RBC AUTO: 93 FL — SIGNIFICANT CHANGE UP (ref 80–100)
MONOCYTES # BLD AUTO: 0.59 K/UL — SIGNIFICANT CHANGE UP (ref 0–0.9)
MONOCYTES NFR BLD AUTO: 8.2 % — SIGNIFICANT CHANGE UP (ref 2–14)
NEUTROPHILS # BLD AUTO: 5.43 K/UL — SIGNIFICANT CHANGE UP (ref 1.8–7.4)
NEUTROPHILS NFR BLD AUTO: 75.6 % — SIGNIFICANT CHANGE UP (ref 43–77)
PLATELET # BLD AUTO: 286 K/UL — SIGNIFICANT CHANGE UP (ref 150–400)
POTASSIUM SERPL-MCNC: 5.2 MMOL/L — SIGNIFICANT CHANGE UP (ref 3.5–5.3)
POTASSIUM SERPL-SCNC: 5.2 MMOL/L — SIGNIFICANT CHANGE UP (ref 3.5–5.3)
RBC # BLD: 4.43 M/UL — SIGNIFICANT CHANGE UP (ref 4.2–5.8)
RBC # FLD: 13 % — SIGNIFICANT CHANGE UP (ref 10.3–14.5)
SODIUM SERPL-SCNC: 139 MMOL/L — SIGNIFICANT CHANGE UP (ref 135–145)
TROPONIN T SERPL-MCNC: <0.01 NG/ML — SIGNIFICANT CHANGE UP (ref 0–0.06)
TROPONIN T SERPL-MCNC: <0.01 NG/ML — SIGNIFICANT CHANGE UP (ref 0–0.06)
WBC # BLD: 7.19 K/UL — SIGNIFICANT CHANGE UP (ref 3.8–10.5)
WBC # FLD AUTO: 7.19 K/UL — SIGNIFICANT CHANGE UP (ref 3.8–10.5)

## 2020-08-05 PROCEDURE — 84484 ASSAY OF TROPONIN QUANT: CPT

## 2020-08-05 PROCEDURE — 80048 BASIC METABOLIC PNL TOTAL CA: CPT

## 2020-08-05 PROCEDURE — 85027 COMPLETE CBC AUTOMATED: CPT

## 2020-08-05 PROCEDURE — 93005 ELECTROCARDIOGRAM TRACING: CPT

## 2020-08-05 PROCEDURE — 93010 ELECTROCARDIOGRAM REPORT: CPT

## 2020-08-05 PROCEDURE — 99283 EMERGENCY DEPT VISIT LOW MDM: CPT

## 2020-08-05 PROCEDURE — 71046 X-RAY EXAM CHEST 2 VIEWS: CPT

## 2020-08-05 PROCEDURE — 99284 EMERGENCY DEPT VISIT MOD MDM: CPT | Mod: 25

## 2020-08-05 PROCEDURE — 36415 COLL VENOUS BLD VENIPUNCTURE: CPT

## 2020-08-05 PROCEDURE — 99285 EMERGENCY DEPT VISIT HI MDM: CPT

## 2020-08-05 PROCEDURE — 71046 X-RAY EXAM CHEST 2 VIEWS: CPT | Mod: 26

## 2020-08-05 RX ORDER — ASPIRIN/CALCIUM CARB/MAGNESIUM 324 MG
162 TABLET ORAL ONCE
Refills: 0 | Status: COMPLETED | OUTPATIENT
Start: 2020-08-05 | End: 2020-08-05

## 2020-08-05 RX ADMIN — Medication 162 MILLIGRAM(S): at 13:02

## 2020-08-05 NOTE — CONSULT NOTE ADULT - SUBJECTIVE AND OBJECTIVE BOX
[facility]ARDI[facility], [facility]                                   [address] [place] [address]                                                      PHONE: [phone]                                                         FAX: [phone]  [email or web address]  ---------------------------------------------------------------------------------------------------------------------------------    Reason for Consult:    HPI:  NAVI UPTON is an 69y Male with HTN HLD baseline RBBB, no evidence of structural HD on prior echo 2016, recent hosp for abnl ECG and PVCs awaiting outpt holter and ischemia workup, who came to ER c/o chest pain.    PAST MEDICAL & SURGICAL HISTORY:  Hypercholesterolemia  GERD (gastroesophageal reflux disease)  BPH (Benign Prostatic Hyperplasia)  HTN (hypertension)  S/P inguinal hernia repair  S/P cholecystectomy      azithromycin (Other)  crabs (Hives)  Flomax (Faint)  penicillin (Hives)      MEDICATIONS  (STANDING):    MEDICATIONS  (PRN):      Social History:  Cigarettes:   neg                 Alchohol:      neg           Illicit Drug Abuse: neg  neg FH CAD     ROS: Negative other than as mentioned in HPI.    Vital Signs Last 24 Hrs  T(C): 36.3 (05 Aug 2020 11:41), Max: 36.3 (05 Aug 2020 11:41)  T(F): 97.4 (05 Aug 2020 11:41), Max: 97.4 (05 Aug 2020 11:41)  HR: 67 (05 Aug 2020 11:41) (67 - 67)  BP: 133/85 (05 Aug 2020 11:41) (133/85 - 133/85)  BP(mean): --  RR: 18 (05 Aug 2020 11:41) (18 - 18)  SpO2: 100% (05 Aug 2020 11:41) (100% - 100%)  ICU Vital Signs Last 24 Hrs  NAVI UPTON  I&O's Detail    I&O's Summary    Drug Dosing Weight  NAVI UPTON      PHYSICAL EXAM:  General: Appears well developed, well nourished alert and cooperative.  HEENT: Head; normocephalic, atraumatic.  Eyes: Pupils reactive, cornea wnl.  Neck: Supple, no nodes adenopathy, no NVD or carotid bruit or thyromegaly.  CARDIOVASCULAR: Normal S1 and S2, No murmur, rub, gallop or lift.   LUNGS: No rales, rhonchi or wheeze. Normal breath sounds bilaterally.  ABDOMEN: Soft, nontender without mass or organomegaly. bowel sounds normoactive.  EXTREMITIES: No clubbing, cyanosis or edema. Distal pulses wnl.   SKIN: warm and dry with normal turgor.  NEURO: Alert/oriented x 3/normal motor exam. No pathologic reflexes.    PSYCH: normal affect.        LABS:                        13.5   7.19  )-----------( 286      ( 05 Aug 2020 13:12 )             41.2           NAVI UPTON            RADIOLOGY & ADDITIONAL STUDIES:    INTERPRETATION OF TELEMETRY (personally reviewed):    ECG: NSR RBBB PVCs    ECHO:< from: TTE Echo w/Cont Complete (11.11.16 @ 10:46) >   Summary:   1. Normal biventricular systolic function. Estimated LVEF = 65-70%.   2. Spectral Doppler shows impaired relaxation pattern of left   ventricular myocardial filling (Grade I diastolic dysfunction).   3. No significant valvular abnormality.   4. There is no evidence of pericardial effusion.   5. ** No prior echocardiograms available for comparison.     Pinky Foote DO Electronically signed on 11/11/2016 at 4:20:46 PM                *** Final ***                  < end of copied text >      STRESS TEST:< from: Nuclear stress test, exercise (09.17.10 @ 10:19) >  IMPRESSIONS: NormalStudy  * Negative maximal exercise treadmill test.  * Excellent exercise performance.  * HR Response: Appropriate  * BP Response: Appropriate  * Myocardial Perfusion SPECT results are normal at 94 % of  MPHR.  * There are large, mild defects in inferior and  inferolateral, inferoseptal walls that are fixed,  suggestive of diaphragmatic attenuation.  * This defect resolves with prone imaging.  * Gated wall motion analysis is performed, and shows  normal wall motion, with calculated LV ejection fraction  of 60%, which is normal.  Addendum 09/30/10: Case reviced to correct patient surname  from Char to Puja and MR# from [id number] to  [id number].  ------------------------------------------------------------------------    < end of copied text >      < from: Cardiac Treadmill Stress Test (Non Imaging) (06.26.19 @ 10:04) >  STRESS TEST IMPRESSIONS:  Exercise capacity: 10 METS, Excellent for age and gender.  87% of MPHR.  Chest Pain: No chest pain with exercise.  Symptom: Shortness of breath.  HR Response: Appropriate.  BP Response: Appropriate.  Heart Rhythm: SinusRhythm.  ECG Abnormalities: There were no diagnostic changes.  Arrhythmia: None.    NEGATIVE STRESS TEST BY ECG CRITERIA AT 87% MPHR    ADDENDUM 7/2/2019: Addendum 7/2/2019: Generate MD  Impression to Exercise Stress test report  ------------------------------------------------------------------------    < end of copied text > Started seeing pt in ER  He explained he saw Dr Wilkinson yesterday and will establish cardiac care with him going forward  I will notify [facility] to consult pt while here at [facility]. Pt is not known office pt to our practice.

## 2020-08-05 NOTE — ED PROVIDER NOTE - PATIENT PORTAL LINK FT
You can access the FollowMyHealth Patient Portal offered by [facility] by registering at the following website: [email or web address]. By joining [employer]s FollowMyHealth portal, you will also be able to view your health information using other applications (apps) compatible with our system.

## 2020-08-05 NOTE — ED PROVIDER NOTE - PHYSICAL EXAMINATION
General: well appearing, NAD  Head:  NC, AT  Eyes: EOMI, no scleral icterus  Ears: no erythema/drainage  Nose: midline, no bleeding/drainage  Throat: MMM  Cardiac: RRR, no m/r/g, no lower extremity edema  Respiratory: CTABL, no wheezes/rales/rhonchi, equal chest wall expansions  Abdomen: soft, ND, NT, no rebound tenderness, no guarding, nonperitonitic  MSK/Vascular: full ROM, distal pulses intact, soft compartments, warm extremities  Neuro: AAOx3, strength 5/5, sensation to light touch intact   Psych: calm, cooperative, normal affect

## 2020-08-05 NOTE — ED PROVIDER NOTE - CARE PLAN
Principal Discharge DX:	PVC (premature ventricular contraction) 1 Principal Discharge DX:	Chest tightness

## 2020-08-05 NOTE — ED ADULT TRIAGE NOTE - CHIEF COMPLAINT QUOTE
c/o chest pressure to left chest, denies sob, sent by cardiologist for further evaluation  A&Ox3, resp wnl, NAD

## 2020-08-05 NOTE — ED PROVIDER NOTE - CARE PROVIDER_API CALL
David Quiroga  CARDIOVASCULAR DISEASE  [address] [place] [address]  Phone: [phone]  Fax: [phone]  Follow Up Time:

## 2020-08-05 NOTE — CONSULT NOTE ADULT - ASSESSMENT
Pt is a 70 y/o male with medical history of HTN, HLD, GERD, BPH, who presents to [facility]-ED for chest pain. Pt states that when he was helping his wife with yard work, he started feeling "unwell". When he went inside house he started to feel chest discomfort described as tightness, 3/10, 1 hour in duration, non-exertional, non-radiating, non-reproducible on palpation. Pt states pain is similar to when he has acid reflux chest discomfort. Pt was recently seen in outpatient cariology office with Dr. Quiroga with c/o of dizziness. Pt was advised to have outpatient US of carotid, and EM. Pt called [facility] answering service and was advised to come to ED. In ED, ECG- NSR HR @ 81, with frequent PVCs and RBBB, Trop#1-negative. Pt denies current symptoms of chest pain, palpitations, SOB, orthopnea, bilateral lower extremity swelling.            Atypical Chest Pain  -ECG- NSR HR @ 81, with frequent PVCs and RBBB  -Tele-NSR HR @ 60s, with frequent PVCs  -Trop#1-negative  -Echo- As per outpatient report 5/2019- EF-65%, grade I diastolic dysfx., normal RV size and fx., normal biatrial size, normal valvular fx., trace mitral regurg, TR, AL,PA sys. pressure 35mmHg,normal  -Repeat Trop  -If negative, pt may be DC'd with plan to follow up with cardiology outpatient ([facility]) and f/u plan for US carotid and EM Pt is a 70 y/o male with medical history of HTN, HLD, GERD, BPH, who presents to [facility]-ED for chest pain. Pt states that when he was helping his wife with yard work, he started feeling "unwell". When he went inside house he started to feel chest discomfort described as tightness, 3/10, 1 hour in duration, non-exertional, non-radiating, non-reproducible on palpation. Pt states pain is similar to when he has acid reflux chest discomfort. Pt was recently seen in outpatient cariology office with Dr. Quiroga with c/o of dizziness. Pt was advised to have outpatient US of carotid, and EM. Pt called [facility] answering service and was advised to come to ED. In ED, ECG- NSR HR @ 81, with frequent PVCs and RBBB, Trop#1-negative. Pt denies current symptoms of chest pain, palpitations, SOB, orthopnea, bilateral lower extremity swelling.            Atypical Chest Pain  -ECG- NSR HR @ 81, with frequent PVCs and RBBB  -Tele-NSR HR @ 60s, with frequent PVCs  -Trop#1-negative  -Echo- As per outpatient report 5/2019- EF-65%, grade I diastolic dysfx., normal RV size and fx., normal biatrial size, normal valvular fx., trace mitral regurg, TR, VT,PA sys. pressure 35mmHg,normal  -Repeat Trop  -Plan for NST in AM, please maintain NPO after MN Pt is a 70 y/o male with medical history of HTN, HLD, GERD, BPH, who presents to [facility]ED for chest pain. Pt states that when he was helping his wife with yard work, he started feeling "unwell". When he went inside house he started to feel chest discomfort described as tightness, 3/10, 1 hour in duration, non-exertional, non-radiating, non-reproducible on palpation. Pt states pain is similar to when he has acid reflux chest discomfort. Pt was recently seen in outpatient cariology office with Dr. Quiroga with c/o of dizziness. Pt was advised to have outpatient US of carotid, and EM. Pt called [facility] answering service and was advised to come to ED. In ED, ECG- NSR HR @ 81, with frequent PVCs and RBBB, Trop#1-negative. Pt denies current symptoms of chest pain, palpitations, SOB, orthopnea, bilateral lower extremity swelling.            Atypical Chest Pain  -ECG- NSR HR @ 81, with frequent PVCs and RBBB  -Tele-NSR HR @ 60s, with frequent PVCs  -Trop#1-negative  -Echo- As per outpatient report 5/2019- EF-65%, grade I diastolic dysfx., normal RV size and fx., normal biatrial size, normal valvular fx., trace mitral regurg, TR, ID,PA sys. pressure 35mmHg,normal  -Repeat Trop-Negative  -Pt can be DC'd home with plan to see cardiologist as outpatient for NST, US carotid and EM

## 2020-08-05 NOTE — ED ADULT TRIAGE NOTE - IDEAL BODY WEIGHT(KG)
Verified Results  (1) CHRONIC HEPATITIS PANEL 03Apr2017 07:57AM Mari Grayson     Test Name Result Flag Reference   HEPATITIS B SURFACE ANTIGEN Non-reactive  Non-reactive, NonReactive - Confirmed   HEPATITIS C ANTIBODY High Reactive A Non-reactive   HEPATITIS B CORE IGM ANTIBODY Non-reactive  Non-reactive   HEPATITIS B CORE TOTAL ANTIBODY Reactive A Non-reactive 71

## 2020-08-05 NOTE — ED PROVIDER NOTE - NS ED ROS FT
Constitutional: no fever, sweats, and no chills.  Eyes: no pain, no redness, and no visual changes.  ENMT: no ear pain and no hearing problems, no nasal congestion/drainage, no dysphagia, and no throat pain, no neck pain, no stiffness  CV: +chest tightness (resolved), no edema.  Resp: no cough, no dyspnea  GI: no abdominal pain, no bloating, no constipation, no diarrhea, no nausea and no vomiting.  : no dysuria, no hematuria  MSK: no msk pain, no weakness  Skin: no lesions, and no rashes.  Neuro: no LOC, no headache, +tingly sensation in bilateral arms (resolved), and no weakness.

## 2020-08-05 NOTE — ED PROVIDER NOTE - PROGRESS NOTE DETAILS
He: Call out to Dr. Bonilla He:  Call out to [facility] He: I reviewed patient's lab results. Initial trop negative. He:  Call out to Dr. Preciado. He: I rechecked the patient. Call out again to Dr. Preciado. He: I rechecked the patient. He is still symptom free. Initial trop negative. Second trop will be at 1530 He: I rechecked the patient. He is symptom free. Gave him snacks. Reviewed new plan for discharge home since 2 negative trops.

## 2020-08-05 NOTE — ED PROVIDER NOTE - ATTENDING CONTRIBUTION TO CARE
John: I performed a face to face bedside interview with patient regarding history of present illness, review of symptoms and past medical history. I completed an independent physical exam.  I have discussed patient's plan of care with resident.   I agree with note as stated above including HISTORY OF PRESENT ILLNESS, HIV, PAST MEDICAL/SURGICAL/FAMILY/SOCIAL HISTORY, ALLERGIES AND HOME MEDICATIONS, REVIEW OF SYSTEMS, PHYSICAL EXAM, MEDICAL DECISION MAKING and any PROGRESS NOTES during the time I functioned as the attending physician for this patient unless otherwise noted. My brief assessment is as follows: 69M h/o HTN, HLD, PVCs, recent CDU stay for palpitations, set up for outpt Holter and doppler p/w 30 min of non-radiating non-exertional L sided CP, asymptomatic in ED. Benign exam, VSS. trop neg x2, ECG unchanged. SS cards recommending NST, pt unable to stay today, SS cards, myself and pt agreeable for pt to have close outpt follow up with SS cards. Return precautions given.

## 2020-08-05 NOTE — ED PROVIDER NOTE - NSFOLLOWUPINSTRUCTIONS_ED_ALL_ED_FT
You were seen in the Emergency Department because you had some chest pain/tightness this morning. Your EKG showed that you are having frequent PVCS (premature ventricular contractions). Your bloodwork, including 2 troponin tests, was all normal. You were seen by Cardiology who felt it was safe to discharge you home. You have an appointment to see Dr. Bonilla's office in the morning for a nuclear stress test. It is important that you DO NOT EAT AFTER MIDNIGHT so the stress test can be performed. Please take care. You were seen in the Emergency Department because you had some chest pain/tightness this morning. Your EKG showed that you are having frequent PVCS (premature ventricular contractions). Your bloodwork, including 2 troponin tests, was all normal. You were seen by Cardiology who felt it was safe to discharge you home with close follow up for outpatient nuclear stress test in addition to previously discussed carotid US and holter monitor.

## 2020-08-05 NOTE — ED CLERICAL - CLERICAL COMMENTS
[facility] called for consult [facility] called for consult . [facility] follows pt call placed

## 2020-08-05 NOTE — ED PROVIDER NOTE - CLINICAL SUMMARY MEDICAL DECISION MAKING FREE TEXT BOX
Mr. Luo is a 69 year old male with past medical history of HTN, HLD, PVCS, and back surgery who presents following an episode of chest pain Mr. Luo is a 69 year old male with past medical history of HTN, HLD, PVCS, and back surgery who presented following an episode of chest tightness. Differential included PVCs v ACS v COVID v CHF. While in ED patient was symptom free. EKG significant for frequent PVCS + RBBB. Trop x 2 negative. All other labs WNL. CXR normal. Cardiology evaluated the patient within the ED and felt patient safe to discharge home. He has an appt with Cardiology for outpatient nuclear stress test in the AM. Mr. Luo is a 69 year old male with past medical history of HTN, HLD, PVCS, and back surgery who presented following an episode of chest tightness. Differential included PVCs v ACS v COVID v CHF. While in ED patient was symptom free. EKG significant for frequent PVCS + RBBB. Trop x 2 negative. All other labs WNL. CXR normal. Cardiology evaluated the patient within the ED and felt patient safe to discharge home. He has an appt with Cardiology for outpatient nuclear stress test in the AM. Also will have carotid US in near future. Mr. Luo is a 69 year old male with past medical history of HTN, HLD, PVCS, and back surgery who presented following an episode of chest tightness. Differential included PVCs v ACS v COVID v CHF. While in ED patient was symptom free. EKG significant for frequent PVCS + RBBB. Trop x 2 negative. All other labs WNL. CXR normal. Cardiology evaluated the patient within the ED and felt patient safe to discharge home. Patient will make an appointment with Dr. Bonilla's office for outpatient stress test, carotid US, and holter monitor. John CORTEZ: 69M h/o HTN, HLD, PVCs, recent CDU stay for palpitations, set up for outpt Holter and doppler p/w 30 min of non-radiating non-exertional L sided CP, asymptomatic in ED. Benign exam, VSS. trop neg x2, ECG unchanged. SS cards recommending NST, pt unable to stay today, SS cards, myself and pt agreeable for pt to have close outpt follow up with SS cards. Return precautions given.

## 2020-08-05 NOTE — CONSULT NOTE ADULT - ATTENDING COMMENTS
68 y/o man previously follows with Dr. Freed at [facility] underwent exercise treadmill EKG test in 2019 with low risk stress test but with recurrent symptoms of L-sided chest tightness, and recent ER visit 3 days ago for palpitations/dizziness, presents again today with L-sided tightness, declined to stay for nuclear stress test due to family issues, given atypical symptoms could also be reflux related, stable for discharge home today as no evidence of ACS. Follow up with his cardiologists at [facility] for further outpatient cardiac testing.     Norman Rojas DO, [facility]  Faculty Non-Invasive Cardiologist

## 2020-08-05 NOTE — CHART NOTE - NSCHARTNOTEFT_GEN_A_CORE
Patient was seen by [facility] [facility] cardiology on last hospital visit August 1st. Dr. Cutler made aware. If any changes please don't hesitate to contact.

## 2020-08-05 NOTE — CONSULT NOTE ADULT - SUBJECTIVE AND OBJECTIVE BOX
[place] [facility]                                                               Office:  [address] [address]                                                              Telephone: [phone]. Fax:[phone]                                                                        CARDIOLOGY CONSULTATION NOTE                                                                                             Consult requested by:  Dr. Lopez  Reason for Consultation: Chest Pain  History obtained by: Patient and medical record   obtained: No    Chief complaint:    Patient is a 69y old  Male who presents with a chief complaint of chest pain      HPI: Pt is a 68 y/o male with medical history of HTN, HLD, GERD, BPH, who presents to [facility] for chest pain.         REVIEW OF SYMPTOMS:     CONSTITUTIONAL: No fever, weight loss, or fatigue  ENMT:  No difficulty hearing, tinnitus, vertigo; No sinus or throat pain  NECK: No pain or stiffness  CARDIOVASCULAR:See HPI  RESPIRATORY: No Dyspnea on exertion, Shortness of breath, cough, wheezing  : No dysuria, no hematuria   GI: No dark color stool, no melena, no diarrhea, no constipation, no abdominal pain   NEURO: No headache, no dizziness, no slurred speech   MUSCULOSKELETAL: No joint pain or swelling; No muscle, back, or extremity pain  PSYCH: No agitation, no anxiety.    ALL OTHER REVIEW OF SYSTEMS ARE NEGATIVE.      PREVIOUS DIAGNOSTIC TESTING  ECHO FINDINGS: < from: TTE Echo w/Cont Complete (11.11.16 @ 10:46) >   Summary:   1. Normal biventricular systolic function. Estimated LVEF = 65-70%.   2. Spectral Doppler shows impaired relaxation pattern of left   ventricular myocardial filling (Grade I diastolic dysfunction).   3. No significant valvular abnormality.   4. There is no evidence of pericardial effusion.   5. ** No prior echocardiograms available for comparison.    < end of copied text >        STRESS FINDINGS: < from: Cardiac Treadmill Stress Test (Non Imaging) (06.26.19 @ 10:04) >  STRESS TEST IMPRESSIONS:  Exercise capacity: 10 METS, Excellent for age and gender.  87% of MPHR.  Chest Pain: No chest pain with exercise.  Symptom: Shortness of breath.  HR Response: Appropriate.  BP Response: Appropriate.  Heart Rhythm: SinusRhythm.  ECG Abnormalities: There were no diagnostic changes.  Arrhythmia: None.    NEGATIVE STRESS TEST BY ECG CRITERIA AT 87% MPHR    < end of copied text >      ALLERGIES: Allergies    azithromycin (Other)  crabs (Hives)  Flomax (Faint)  penicillin (Hives)    Intolerances          PAST MEDICAL HISTORY  Hypercholesterolemia  GERD (gastroesophageal reflux disease)  BPH (Benign Prostatic Hyperplasia)  HTN (hypertension)      PAST SURGICAL HISTORY  S/P inguinal hernia repair  S/P cholecystectomy      FAMILY HISTORY:  No pertinent family history in first degree relatives      SOCIAL HISTORY:  Denies smoking/alcohol/drugs  CIGARETTES:     ALCOHOL:  DRUGS:      CURRENT MEDICATIONS:           HOME MEDICATIONS:    aspirin 81 mg oral tablet, chewable: 1 tab(s) orally once a day (11 Nov 2016 15:22)  atorvastatin 20 mg oral tablet: 1 tab(s) orally once a day (at bedtime) (11 Nov 2016 15:22)  metoprolol succinate 50 mg oral tablet, extended release: 1 tab(s) orally once a day (11 Nov 2016 15:22)  pantoprazole 40 mg oral delayed release tablet: 1 tab(s) orally once a day (before a meal) (11 Nov 2016 15:22)  tamsulosin 0.4 mg oral capsule: 1 cap(s) orally once a day (at bedtime) (11 Nov 2016 15:22)      Vital Signs Last 24 Hrs  T(C): 36.3 (05 Aug 2020 11:41), Max: 36.3 (05 Aug 2020 11:41)  T(F): 97.4 (05 Aug 2020 11:41), Max: 97.4 (05 Aug 2020 11:41)  HR: 67 (05 Aug 2020 11:41) (67 - 67)  BP: 133/85 (05 Aug 2020 11:41) (133/85 - 133/85)  RR: 18 (05 Aug 2020 11:41) (18 - 18)  SpO2: 100% (05 Aug 2020 11:41) (100% - 100%)      PHYSICAL EXAM:  Constitutional: Comfortable . No acute distress.   HEENT: Atraumatic and normocephalic , neck is supple . no JVD. No carotid bruit. PEERL   CNS: A&Ox3. No focal deficits. EOMI.    Lymph Nodes: Cervical : Not palpable.  Respiratory: CTAB  Cardiovascular: S1S2 RRR. No murmur/rubs or gallop.  Gastrointestinal: Soft non-tender and non distended . +Bowel sounds. negative Herbert's sign.  Extremities: No edema.   Psychiatric: Calm . no agitation.  Skin: No skin rash/ulcers visualized to face, hands or feet.    Intake and output:     LABS:                        13.5   7.19  )-----------( 286      ( 05 Aug 2020 13:12 )             41.2     08-05    139  |  100  |  20.0  ----------------------------<  113<H>  5.2   |  30.0<H>  |  1.07    Ca    9.5      05 Aug 2020 13:12      CARDIAC MARKERS ( 05 Aug 2020 13:12 )  x     / <0.01 ng/mL / x     / x     / x        ;p-BNP=        INTERPRETATION OF TELEMETRY:   ECG:  NSR HR @ 81, with frequent PVCs and RBBB    RADIOLOGY & ADDITIONAL STUDIES:    X-ray:  < from: Xray Chest 1 View-PORTABLE IMMEDIATE (07.31.20 @ 19:28) >  Impression: No active pulmonary disease. Minimal scarring in BILATERAL lower lobes.    < end of copied text > [place] [facility]                                                               Office:  [address] [address]                                                              Telephone: [phone]. Fax:[phone]                                                                        CARDIOLOGY CONSULTATION NOTE                                                                                             Consult requested by:  Dr. Lopez  Reason for Consultation: Chest Pain  History obtained by: Patient and medical record   obtained: No    Chief complaint:    Patient is a 69y old  Male who presents with a chief complaint of chest pain      HPI: Pt is a 70 y/o male with medical history of HTN, HLD, GERD, BPH, who presents to [facility] for chest pain. Pt states that when he was helping his wife with yard work, he started feeling "unwell". When he went inside house he started to feel chest discomfort described as tightness, 3/10, 1 hour in duration, non-exertional, non-radiating, non-reproducible on palpation. Pt states pain is similar to when he has acid reflux chest discomfort. Pt was recently seen in outpatient cariology office with Dr. Quiroga with c/o of dizziness. Pt was advised to have outpatient US of carotid, and EM. Pt called [facility] answering service and was advised to come to ED. In ED, ECG- NSR HR @ 81, with frequent PVCs and RBBB, Trop#1-negative. Pt denies current symptoms of chest pain, palpitations, SOB, orthopnea, bilateral lower extremity swelling.              REVIEW OF SYMPTOMS:     CONSTITUTIONAL: No fever, weight loss, or fatigue  ENMT:  No difficulty hearing, tinnitus, vertigo; No sinus or throat pain  NECK: No pain or stiffness  CARDIOVASCULAR: See HPI  RESPIRATORY: No Dyspnea on exertion, Shortness of breath, cough, wheezing  : No dysuria, no hematuria   GI: No dark color stool, no melena, no diarrhea, no constipation, no abdominal pain   NEURO: No headache, no dizziness, no slurred speech   MUSCULOSKELETAL: No joint pain or swelling; No muscle, back, or extremity pain  PSYCH: No agitation, no anxiety.    ALL OTHER REVIEW OF SYSTEMS ARE NEGATIVE.      PREVIOUS DIAGNOSTIC TESTING  ECHO FINDINGS: As per outpatient report 5/2019- EF-65%, grade I diastolic dysfx., normal RV size and fx., normal biatrial size, normal valvular fx., trace mitral regurg, TR, TX,PA sys. pressure 35mmHg,normal      STRESS FINDINGS: < from: Cardiac Treadmill Stress Test (Non Imaging) (06.26.19 @ 10:04) >  STRESS TEST IMPRESSIONS:  Exercise capacity: 10 METS, Excellent for age and gender.  87% of MPHR.  Chest Pain: No chest pain with exercise.  Symptom: Shortness of breath.  HR Response: Appropriate.  BP Response: Appropriate.  Heart Rhythm: SinusRhythm.  ECG Abnormalities: There were no diagnostic changes.  Arrhythmia: None.    NEGATIVE STRESS TEST BY ECG CRITERIA AT 87% MPHR    < end of copied text >      ALLERGIES: Allergies    azithromycin (Other)  crabs (Hives)  Flomax (Faint)  penicillin (Hives)    Intolerances          PAST MEDICAL HISTORY  Hypercholesterolemia  GERD (gastroesophageal reflux disease)  BPH (Benign Prostatic Hyperplasia)  HTN (hypertension)      PAST SURGICAL HISTORY  S/P inguinal hernia repair  S/P cholecystectomy      FAMILY HISTORY:  No pertinent family history in first degree relatives      SOCIAL HISTORY:    CIGARETTES: Denies    ALCOHOL: Denies  DRUGS: Denies       CURRENT MEDICATIONS:           HOME MEDICATIONS:    aspirin 81 mg oral tablet, chewable: 1 tab(s) orally once a day (11 Nov 2016 15:22)  atorvastatin 20 mg oral tablet: 1 tab(s) orally once a day (at bedtime) (11 Nov 2016 15:22)  metoprolol succinate 50 mg oral tablet, extended release: 1 tab(s) orally once a day (11 Nov 2016 15:22)  pantoprazole 40 mg oral delayed release tablet: 1 tab(s) orally once a day (before a meal) (11 Nov 2016 15:22)  tamsulosin 0.4 mg oral capsule: 1 cap(s) orally once a day (at bedtime) (11 Nov 2016 15:22)      Vital Signs Last 24 Hrs  T(C): 36.3 (05 Aug 2020 11:41), Max: 36.3 (05 Aug 2020 11:41)  T(F): 97.4 (05 Aug 2020 11:41), Max: 97.4 (05 Aug 2020 11:41)  HR: 67 (05 Aug 2020 11:41) (67 - 67)  BP: 133/85 (05 Aug 2020 11:41) (133/85 - 133/85)  RR: 18 (05 Aug 2020 11:41) (18 - 18)  SpO2: 100% (05 Aug 2020 11:41) (100% - 100%)      PHYSICAL EXAM:  Constitutional: Comfortable . No acute distress.   HEENT: Atraumatic and normocephalic , neck is supple . no JVD. No carotid bruit. PEERL   CNS: A&Ox3. No focal deficits. EOMI.    Lymph Nodes: Cervical : Not palpable.  Respiratory: CTAB  Cardiovascular: S1S2 RRR. No murmur/rubs or gallop.  Gastrointestinal: Soft non-tender and non distended . +Bowel sounds. negative Herbert's sign.  Extremities: No edema.   Psychiatric: Calm . no agitation.  Skin: No skin rash/ulcers visualized to face, hands or feet.    Intake and output:     LABS:                        13.5   7.19  )-----------( 286      ( 05 Aug 2020 13:12 )             41.2     08-05    139  |  100  |  20.0  ----------------------------<  113<H>  5.2   |  30.0<H>  |  1.07    Ca    9.5      05 Aug 2020 13:12      CARDIAC MARKERS ( 05 Aug 2020 13:12 )  x     / <0.01 ng/mL / x     / x     / x        ;p-BNP=        INTERPRETATION OF TELEMETRY: NSR HR @ 60s  ECG:  NSR HR @ 81, with frequent PVCs and RBBB    RADIOLOGY & ADDITIONAL STUDIES:    X-ray:  < from: Xray Chest 1 View-PORTABLE IMMEDIATE (07.31.20 @ 19:28) >  Impression: No active pulmonary disease. Minimal scarring in BILATERAL lower lobes.    < end of copied text >

## 2020-08-05 NOTE — ED PROVIDER NOTE - OBJECTIVE STATEMENT
Mr. Luo is a 69 year old male with past medical history of HTN, HLD, PVCS, and back surgery who presents following an episode of chest pain. Patient recently admitted to obs 7/31-8/1 with palpitations. He was seen by cardiology and outpatient follow up was secured. On 8/4 (yesterday) patient seen by cardiology - . EKG done. Patient recommended to have Holter monitor for next 30 days (made call today to get this), have a carotid US (scheduled now for 8/11), and make a neurology appointment. This morning at about 1030 the patient had just gone outside when he had an onset of left sided, non-reproducible, chest tightness without any accompanying symptoms. He took 81 mg aspirin and then called his cardiology office who recommended he come to ED for further eval. Here in the ED he is symptom free. Reports he had a stress test last year that was normal but does not think he has ever had an ECHO. Mr. Luo is a 69 year old male with past medical history of HTN, HLD, PVCs, and back surgery who presents following an episode of chest pain. Patient recently admitted to obs 7/31-8/1 with palpitations. He was seen by cardiology and outpatient follow up was secured. On 8/4 (yesterday) patient seen by cardiology - . EKG done. Patient recommended to have Holter monitor for next 30 days (made call today to get this), have a carotid US (scheduled now for 8/11), and make a neurology appointment. This morning at about 1030 the patient had just gone outside when he had an onset of left sided, non-reproducible, chest tightness without any accompanying symptoms. He took 81 mg aspirin and then called his cardiology office who recommended he come to ED for further eval. Here in the ED he is symptom free. Reports he had a stress test last year that was normal but does not think he has ever had an ECHO.

## 2020-08-27 ENCOUNTER — APPOINTMENT (OUTPATIENT)
Dept: GASTROENTEROLOGY | Facility: CLINIC | Age: 70
End: 2020-08-27
Payer: MEDICARE

## 2020-08-27 VITALS
SYSTOLIC BLOOD PRESSURE: 140 MMHG | HEART RATE: 78 BPM | HEIGHT: 70 IN | OXYGEN SATURATION: 98 % | DIASTOLIC BLOOD PRESSURE: 90 MMHG | WEIGHT: 171 LBS | BODY MASS INDEX: 24.48 KG/M2 | TEMPERATURE: 97.2 F

## 2020-08-27 DIAGNOSIS — R19.5 OTHER FECAL ABNORMALITIES: ICD-10-CM

## 2020-08-27 DIAGNOSIS — Z87.438 PERSONAL HISTORY OF OTHER DISEASES OF MALE GENITAL ORGANS: ICD-10-CM

## 2020-08-27 DIAGNOSIS — I49.9 CARDIAC ARRHYTHMIA, UNSPECIFIED: ICD-10-CM

## 2020-08-27 PROCEDURE — 99203 OFFICE O/P NEW LOW 30 MIN: CPT

## 2020-08-27 NOTE — HISTORY OF PRESENT ILLNESS
[de-identified] : Renetta Levin MD\par [address]\[place] [address]\par \par Pleasant 69-year-old gentleman who has a history of colon polyps and apparently had blood detected in stool in December 2019\par \par He has mild chronic constipation and does well with MiraLAX and a healthy high-fiber diet\par \par No thinning of stool, no obvious visible blood per rectum\par \par No family history of colon cancer\par \par No abdominal, pelvic, anal or rectal pain\par \par Patient is in the midst of a cardiac work-up, he is currently wearing a heart monitor\par \par He will probably have further cardiac evaluation with possible thallium stress test and echocardiogram\par \par He will complete cardiac work-up, have cardiology follow-up and we will get cardiac clearance and then he will schedule colonoscopy for the end of October

## 2020-08-27 NOTE — ASSESSMENT
[FreeTextEntry1] : Impression,\par \par Occult blood in stool\par \par History of colon polyps\par \par Request for colon cancer screening\par \par Currently undergoing cardiac work-up including heart monitor and may be further test to follow\par \par Suggest,\par \par Cardiology clearance\par \par Anesthesia clearance\par \par Colonoscopy\par \par TriLyte\par \par Risks/benefits:\par The procedure, the risks and benefits and alternatives have been reviewed in great detail with the patient.  Risks including, but not limited to sedation such as cardiac and pulmonary compromise, the procedure itself such as bleeding requiring hospitalization, transfusion, surgery, temporary or permanent colostomy.  Perforation or puncture of the requiring hospitalization, surgery, temporary colostomy.\par It has been explained to the patient that though colonoscopy is thought to be the best screening exam for colon cancer and polyps, no screening exam can find all colon polyps or cancers.  \par The patient expresses understanding of the procedure and consents to undergoing the procedure.\par \par

## 2020-08-27 NOTE — REASON FOR VISIT
[Initial Evaluation] : an initial evaluation [FreeTextEntry1] : Occult blood in stool, history of colon polyps, request for colon cancer screening

## 2020-10-26 ENCOUNTER — APPOINTMENT (OUTPATIENT)
Dept: GASTROENTEROLOGY | Facility: AMBULATORY MEDICAL SERVICES | Age: 70
End: 2020-10-26

## 2020-10-29 ENCOUNTER — NON-APPOINTMENT (OUTPATIENT)
Age: 70
End: 2020-10-29

## 2020-10-29 ENCOUNTER — APPOINTMENT (OUTPATIENT)
Dept: ELECTROPHYSIOLOGY | Facility: CLINIC | Age: 70
End: 2020-10-29
Payer: MEDICARE

## 2020-10-29 VITALS
TEMPERATURE: 97.1 F | OXYGEN SATURATION: 98 % | WEIGHT: 175 LBS | HEIGHT: 70 IN | SYSTOLIC BLOOD PRESSURE: 155 MMHG | DIASTOLIC BLOOD PRESSURE: 66 MMHG | BODY MASS INDEX: 25.05 KG/M2 | HEART RATE: 66 BPM

## 2020-10-29 DIAGNOSIS — R07.89 OTHER CHEST PAIN: ICD-10-CM

## 2020-10-29 DIAGNOSIS — I10 ESSENTIAL (PRIMARY) HYPERTENSION: ICD-10-CM

## 2020-10-29 DIAGNOSIS — I49.3 VENTRICULAR PREMATURE DEPOLARIZATION: ICD-10-CM

## 2020-10-29 PROCEDURE — 93000 ELECTROCARDIOGRAM COMPLETE: CPT

## 2020-10-29 PROCEDURE — 99072 ADDL SUPL MATRL&STAF TM PHE: CPT

## 2020-10-29 PROCEDURE — 99204 OFFICE O/P NEW MOD 45 MIN: CPT

## 2020-10-29 NOTE — ADDENDUM
[FreeTextEntry1] : I was present for the above evaluation and agree with the history, physical exam, assessment and plan as above. 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 He has had normal LV function and no symptoms of CHF. Discussed managemnt options in detail- will continue conservative management with serial Hotler and TTEs. If progressive symptoms, PVC burden increasing over 20%, or evidence of new cardiomyopathy will readdress PVC ablation.

## 2020-10-29 NOTE — PHYSICAL EXAM
[General Appearance - Well Developed] : well developed [General Appearance - Well Nourished] : well nourished [Heart Rate And Rhythm] : heart rate and rhythm were normal [Heart Sounds] : normal S1 and S2 [Murmurs] : no murmurs present [Arterial Pulses Normal] : the arterial pulses were normal [Edema] : no peripheral edema present [] : no respiratory distress [Abnormal Walk] : normal gait [Oriented To Time, Place, And Person] : oriented to person, place, and time

## 2020-10-29 NOTE — DISCUSSION/SUMMARY
[FreeTextEntry1] : 70 year old gentleman with history of HTN, GERD, HLD, cervical spinal disease s/p fusion, presenting for evaluation regarding lightheadedness and frequent PVCs. \par \par Recently he was noted to have frequent PVCs and was sent to the ED for evaluation on 7/31/20. He was thought to be dehydrated at that time. He noted that he was having irregular heart beat on home monitor but was not overtly symptomatic. He went to [facility] and was referred to ER. Troponins were negative and he was discharged home with outpatient cardiology f/u recommeded. He does not feel palpitations, but does experience chest tightness from time to time at rest, non radiating. \par He had syncope in 2017 after he initiated flomax and it occurred going from sit to stand. He has had no further lightheadedness, dizziness, near syncope, or syncope. \par \par A 30 day event monitor performed 8/13- 9/12/20 revealed sinus rhythm with rates 51- 110 bpm, frequent monomorphic PVCs and couplets (10% burden) and episodes of NSVT up to 2 beats at 121 bpm. \par \par Recommendation: \par \par - Atypical chest pain with 10% PVC burden, with history of recent exercise stress test, to obtain nuclear stress test. \par -Asymptomatic PVC's- to continue close follow up to monitor burden, or evidence of cardiomyopathy. Will arrange echocardiogram and 24 hour holter in 6 months, EP follow once testing complete.\par \par Mary KHALIL

## 2020-10-29 NOTE — REVIEW OF SYSTEMS
[Chest  Pressure] : chest pressure [Headache] : no headache [Feeling Fatigued] : not feeling fatigued [Shortness Of Breath] : no shortness of breath [Dyspnea on exertion] : not dyspnea during exertion [Chest Pain] : no chest pain [Lower Ext Edema] : no extremity edema [Palpitations] : no palpitations [Cough] : no cough [Dizziness] : no dizziness [Easy Bleeding] : no tendency for easy bleeding [Easy Bruising] : no tendency for easy bruising

## 2020-10-29 NOTE — HISTORY OF PRESENT ILLNESS
[FreeTextEntry1] : 70 year old gentleman with history of HTN, GERD, HLD, cervical spinal disease s/p fusion, presenting for evaluation regarding lightheadedness and frequent PVCs. \par \par Recently he was noted to have frequent PVCs and was sent to the ED for evaluation on 7/31/20. He was thought to be dehydrated at that time. He noted that he was having irregular heart beat on home monitor but was not overtly symptomatic. He went to [facility] and was referred to ER. Troponins were negative and he was discharged home with outpatient NST recommended. He does not feel palpitations, but does experience chest tightness from time to time occurring at rest, non radiating. \par He had syncope in 2017 after he initiated flomax and it occurred going from sit to stand. He has had no further lightheadedness, dizziness, near syncope, or syncope. \par \par A 30 day event monitor performed 8/13- 9/12/20 revealed sinus rhythm with rates 51- 110 bpm, frequent monomorphic PVCs and couplets (10% burden) and episodes of NSVT up to 2 beats at 121 bpm. \par \par On ECG he is in sinus rhythm with RBBB and frequent PVCs with LB inferior axis, with transition in V3 (and QS in V1 and V2) c/w outflow tract etiology. \par \par ETT 6/26/19 no ischemia reported, 10 METS\par TTE LVEF 65%, nml biatrial size, normal valvular function, trace TR, RVSP 35\par \par Current medication include Toprol 50mg daily. \par

## 2020-11-18 ENCOUNTER — OUTPATIENT (OUTPATIENT)
Dept: OUTPATIENT SERVICES | Facility: HOSPITAL | Age: 70
LOS: 1 days | End: 2020-11-18
Payer: COMMERCIAL

## 2020-11-18 DIAGNOSIS — I47.2 VENTRICULAR TACHYCARDIA: ICD-10-CM

## 2020-11-18 PROCEDURE — 93018 CV STRESS TEST I&R ONLY: CPT

## 2020-11-18 PROCEDURE — 93016 CV STRESS TEST SUPVJ ONLY: CPT

## 2020-11-18 PROCEDURE — 93017 CV STRESS TEST TRACING ONLY: CPT

## 2020-11-18 PROCEDURE — A9500: CPT

## 2020-11-18 PROCEDURE — 78452 HT MUSCLE IMAGE SPECT MULT: CPT

## 2020-11-18 PROCEDURE — 78452 HT MUSCLE IMAGE SPECT MULT: CPT | Mod: 26

## 2020-12-19 DIAGNOSIS — Z01.818 ENCOUNTER FOR OTHER PREPROCEDURAL EXAMINATION: ICD-10-CM

## 2020-12-20 ENCOUNTER — APPOINTMENT (OUTPATIENT)
Dept: DISASTER EMERGENCY | Facility: CLINIC | Age: 70
End: 2020-12-20

## 2020-12-22 LAB — SARS-COV-2 N GENE NPH QL NAA+PROBE: NOT DETECTED

## 2020-12-22 RX ORDER — CHLORHEXIDINE GLUCONATE 213 G/1000ML
1 SOLUTION TOPICAL ONCE
Refills: 0 | Status: COMPLETED | OUTPATIENT
Start: 2020-12-23 | End: 2020-12-23

## 2020-12-22 NOTE — H&P PST ADULT - ASSESSMENT
70 year  man  PMH : GERD, HTN, HL Family history of CAD father . Pt with near syncope after starting Flomax. He took his BP at home and found his heart rate was irregular . He called his primary who instructed him to go to the ER . He was admitted 7/31-8/1 with palpitations. He was seen by cardiology and outpatient follow up was secured. On 8/4  patient seen by cardiology - . EKG done. Patient recommended to have Holter monitor , echo and NST. His NST done on 11/18/20 which revealed inferior wall ischemia   He was referred for cardiac cath     PRE-PROCEDURE ASSESSMENT  [facility] -Patient seen and examined  -Labs and EKG reviewed   -Pre-procedure teaching completed with patient and family   Informed consent obtained-Questions answered  - Pt received Asprin prior to cardiac cath   Risks & benefits of procedure and sedation and risks and benefits for the alternative therapy have been explained to the patient in layman’s terms including but not limited to: allergic reaction, bleeding, infection, arrhythmia, respiratory compromise, renal and vascular compromise, limb damage, MI, CVA, emergent CABG/Vascular Surgery and death. Informed consent obtained and in chart.

## 2020-12-22 NOTE — H&P PST ADULT - NSICDXPASTMEDICALHX_GEN_ALL_CORE_FT
PAST MEDICAL HISTORY:  BPH (Benign Prostatic Hyperplasia)     GERD (gastroesophageal reflux disease)     HTN (hypertension)     Hypercholesterolemia

## 2020-12-22 NOTE — H&P PST ADULT - BIRTH SEX
Last 5 Patient Entered Readings                                      Current 30 Day Average: 118/78     Recent Readings 8/5/2019 7/30/2019 7/15/2019 7/15/2019 7/7/2019    SBP (mmHg) 120 116 115 129 114    DBP (mmHg) 79 76 74 88 77    Pulse 60 63 62 64 62          Pt sent [employer] message requesting removal from the program. She feels like her BP is well controlled and that she no longer needs to be monitored. Notified referring provider and removed pt from [facility].   Male

## 2020-12-22 NOTE — H&P PST ADULT - HISTORY OF PRESENT ILLNESS
68 y/o man GERD, HTN, HLD with frequent PVCs was sent for stress test which was abnormal. Patient presents for [facility].     < from: Nuclear Stress Test-Pharmacologic (11.18.20 @ 09:51) >  IMPRESSIONS:Abnormal Study  * Chest Pain: No chest pain with administration of  Regadenoson.  * Symptom: Shortness of breath.  * HR Response: Appropriate.  * BP Response: Appropriate.  * Heart Rhythm: Normal Sinus Rhythm - 71 BPM.  * Baseline ECG: RBBB.  * ECG Abnormalities: There were no diagnostic changes.  * Arrhythmia: None.  * Small to Medium size fixed defect involving basal and  mid inferior wall with mild goldy infarct ischemia.  *Post-stress resting myocardial perfusion gated SPECT  imaging was performed (LVEF > 70%;LVEDV = 61 ml.)    Conclusion:  Small to Medium size fixed defect involving basal and mid  inferior wall with mild goldy infarct ischemia.  ------------------------------------------------------------------------      ------------------------------------------------------------------------    Confirmed on  11/18/2020 - 12:41:31 by Beverley Freed MD   Cardiology Fellow: Mesfin MANCERA  ------------------------------------------------------------------------    < end of copied text >    11/12/2020: EF 55-60%. Trace      ASA:  Malampati:  GFR:  BRA:    Antianginals:  Norvasc, toprol xl 70 year  man  PMH : GERD, HTN, HL Family history of CAD father . Pt with near syncope after starting Flomax. He took his BP at home and found his heart rate was irregular . He called his primary who instructed him to go to the ER . He was admitted 7/31-8/1 with palpitations. He was seen by cardiology and outpatient follow up was secured. On 8/4  patient seen by cardiology - . EKG done. Patient recommended to have Holter monitor , echo and NST. His NST done on 11/18/20 which revealed inferior wall ischemia   He was referred for cardiac cath      Nuclear Stress Test-Pharmacologic (11.18.20   IMPRESSIONS: Abnormal Study  * Chest Pain: No chest pain with administration of  Regadenoson.  * Symptom: Shortness of breath.  * HR Response: Appropriate.  * BP Response: Appropriate.  * Heart Rhythm: Normal Sinus Rhythm - 71 BPM.  * Baseline ECG: RBBB.  * ECG Abnormalities: There were no diagnostic changes.  * Arrhythmia: None.  * Small to Medium size fixed defect involving basal and  mid inferior wall with mild goldy infarct ischemia.  *Post-stress resting myocardial perfusion gated SPECT  imaging was performed (LVEF > 70%;LVEDV = 61 ml.)    Conclusion:  Small to Medium size fixed defect involving basal and mid  inferior wall with mild goldy infarct ischemia.  ------------------------------------------------------------------------          11/12/2020: EF 55-60%. Trace MR.     ASA:2  Mallampati 2  GFR:  BRA:    Antianginals:  Norvasc, toprol xl

## 2020-12-23 ENCOUNTER — TRANSCRIPTION ENCOUNTER (OUTPATIENT)
Age: 70
End: 2020-12-23

## 2020-12-23 ENCOUNTER — INPATIENT (INPATIENT)
Facility: HOSPITAL | Age: 70
LOS: 1 days | Discharge: HOME | DRG: 247 | End: 2020-12-25
Attending: INTERNAL MEDICINE | Admitting: INTERNAL MEDICINE
Payer: COMMERCIAL

## 2020-12-23 VITALS
RESPIRATION RATE: 16 BRPM | OXYGEN SATURATION: 98 % | SYSTOLIC BLOOD PRESSURE: 179 MMHG | TEMPERATURE: 98 F | HEART RATE: 80 BPM | DIASTOLIC BLOOD PRESSURE: 80 MMHG

## 2020-12-23 DIAGNOSIS — R93.1 ABNORMAL FINDINGS ON DIAGNOSTIC IMAGING OF HEART AND CORONARY CIRCULATION: ICD-10-CM

## 2020-12-23 LAB
ANION GAP SERPL CALC-SCNC: 11 MMOL/L — SIGNIFICANT CHANGE UP (ref 5–17)
APTT BLD: 30.1 SEC — SIGNIFICANT CHANGE UP (ref 27.5–35.5)
BASOPHILS # BLD AUTO: 0.04 K/UL — SIGNIFICANT CHANGE UP (ref 0–0.2)
BASOPHILS NFR BLD AUTO: 0.5 % — SIGNIFICANT CHANGE UP (ref 0–2)
BUN SERPL-MCNC: 20 MG/DL — SIGNIFICANT CHANGE UP (ref 8–20)
CALCIUM SERPL-MCNC: 9 MG/DL — SIGNIFICANT CHANGE UP (ref 8.6–10.2)
CHLORIDE SERPL-SCNC: 104 MMOL/L — SIGNIFICANT CHANGE UP (ref 98–107)
CO2 SERPL-SCNC: 27 MMOL/L — SIGNIFICANT CHANGE UP (ref 22–29)
CREAT SERPL-MCNC: 1.01 MG/DL — SIGNIFICANT CHANGE UP (ref 0.5–1.3)
EOSINOPHIL # BLD AUTO: 0.07 K/UL — SIGNIFICANT CHANGE UP (ref 0–0.5)
EOSINOPHIL NFR BLD AUTO: 0.9 % — SIGNIFICANT CHANGE UP (ref 0–6)
GLUCOSE SERPL-MCNC: 133 MG/DL — HIGH (ref 70–99)
HCT VFR BLD CALC: 40.9 % — SIGNIFICANT CHANGE UP (ref 39–50)
HGB BLD-MCNC: 13.5 G/DL — SIGNIFICANT CHANGE UP (ref 13–17)
IMM GRANULOCYTES NFR BLD AUTO: 0.7 % — SIGNIFICANT CHANGE UP (ref 0–1.5)
INR BLD: 1.09 RATIO — SIGNIFICANT CHANGE UP (ref 0.88–1.16)
LYMPHOCYTES # BLD AUTO: 1.09 K/UL — SIGNIFICANT CHANGE UP (ref 1–3.3)
LYMPHOCYTES # BLD AUTO: 14.6 % — SIGNIFICANT CHANGE UP (ref 13–44)
MCHC RBC-ENTMCNC: 30.5 PG — SIGNIFICANT CHANGE UP (ref 27–34)
MCHC RBC-ENTMCNC: 33 GM/DL — SIGNIFICANT CHANGE UP (ref 32–36)
MCV RBC AUTO: 92.5 FL — SIGNIFICANT CHANGE UP (ref 80–100)
MONOCYTES # BLD AUTO: 0.65 K/UL — SIGNIFICANT CHANGE UP (ref 0–0.9)
MONOCYTES NFR BLD AUTO: 8.7 % — SIGNIFICANT CHANGE UP (ref 2–14)
NEUTROPHILS # BLD AUTO: 5.57 K/UL — SIGNIFICANT CHANGE UP (ref 1.8–7.4)
NEUTROPHILS NFR BLD AUTO: 74.6 % — SIGNIFICANT CHANGE UP (ref 43–77)
PLATELET # BLD AUTO: 277 K/UL — SIGNIFICANT CHANGE UP (ref 150–400)
POTASSIUM SERPL-MCNC: 4.1 MMOL/L — SIGNIFICANT CHANGE UP (ref 3.5–5.3)
POTASSIUM SERPL-SCNC: 4.1 MMOL/L — SIGNIFICANT CHANGE UP (ref 3.5–5.3)
PROTHROM AB SERPL-ACNC: 12.6 SEC — SIGNIFICANT CHANGE UP (ref 10.6–13.6)
RBC # BLD: 4.42 M/UL — SIGNIFICANT CHANGE UP (ref 4.2–5.8)
RBC # FLD: 12.8 % — SIGNIFICANT CHANGE UP (ref 10.3–14.5)
SODIUM SERPL-SCNC: 142 MMOL/L — SIGNIFICANT CHANGE UP (ref 135–145)
WBC # BLD: 7.47 K/UL — SIGNIFICANT CHANGE UP (ref 3.8–10.5)
WBC # FLD AUTO: 7.47 K/UL — SIGNIFICANT CHANGE UP (ref 3.8–10.5)

## 2020-12-23 PROCEDURE — 99223 1ST HOSP IP/OBS HIGH 75: CPT | Mod: 25

## 2020-12-23 PROCEDURE — 93458 L HRT ARTERY/VENTRICLE ANGIO: CPT | Mod: 26

## 2020-12-23 PROCEDURE — 99152 MOD SED SAME PHYS/QHP 5/>YRS: CPT

## 2020-12-23 RX ORDER — ATORVASTATIN CALCIUM 80 MG/1
1 TABLET, FILM COATED ORAL
Qty: 0 | Refills: 1 | DISCHARGE
Start: 2020-12-23 | End: 2021-02-20

## 2020-12-23 RX ORDER — CLOPIDOGREL BISULFATE 75 MG/1
1 TABLET, FILM COATED ORAL
Qty: 90 | Refills: 3
Start: 2020-12-23 | End: 2021-12-17

## 2020-12-23 RX ORDER — CLOPIDOGREL BISULFATE 75 MG/1
600 TABLET, FILM COATED ORAL ONCE
Refills: 0 | Status: COMPLETED | OUTPATIENT
Start: 2020-12-23 | End: 2020-12-23

## 2020-12-23 RX ORDER — ATORVASTATIN CALCIUM 80 MG/1
1 TABLET, FILM COATED ORAL
Qty: 0 | Refills: 0 | DISCHARGE

## 2020-12-23 RX ORDER — CLOPIDOGREL BISULFATE 75 MG/1
75 TABLET, FILM COATED ORAL DAILY
Refills: 0 | Status: DISCONTINUED | OUTPATIENT
Start: 2020-12-24 | End: 2020-12-24

## 2020-12-23 RX ORDER — ATORVASTATIN CALCIUM 80 MG/1
80 TABLET, FILM COATED ORAL AT BEDTIME
Refills: 0 | Status: DISCONTINUED | OUTPATIENT
Start: 2020-12-23 | End: 2020-12-25

## 2020-12-23 RX ORDER — ATORVASTATIN CALCIUM 80 MG/1
1 TABLET, FILM COATED ORAL
Qty: 30 | Refills: 1
Start: 2020-12-23 | End: 2021-02-20

## 2020-12-23 RX ORDER — AMLODIPINE BESYLATE 2.5 MG/1
5 TABLET ORAL DAILY
Refills: 0 | Status: DISCONTINUED | OUTPATIENT
Start: 2020-12-23 | End: 2020-12-25

## 2020-12-23 RX ORDER — ASPIRIN/CALCIUM CARB/MAGNESIUM 324 MG
81 TABLET ORAL DAILY
Refills: 0 | Status: DISCONTINUED | OUTPATIENT
Start: 2020-12-23 | End: 2020-12-25

## 2020-12-23 RX ORDER — ATORVASTATIN CALCIUM 80 MG/1
0.5 TABLET, FILM COATED ORAL
Qty: 0 | Refills: 1 | DISCHARGE
Start: 2020-12-23 | End: 2021-02-20

## 2020-12-23 RX ORDER — METOPROLOL TARTRATE 50 MG
50 TABLET ORAL DAILY
Refills: 0 | Status: DISCONTINUED | OUTPATIENT
Start: 2020-12-23 | End: 2020-12-25

## 2020-12-23 RX ADMIN — ATORVASTATIN CALCIUM 80 MILLIGRAM(S): 80 TABLET, FILM COATED ORAL at 21:21

## 2020-12-23 RX ADMIN — CLOPIDOGREL BISULFATE 600 MILLIGRAM(S): 75 TABLET, FILM COATED ORAL at 11:49

## 2020-12-23 NOTE — DISCHARGE NOTE PROVIDER - NSDCFUADDINST_GEN_ALL_CORE_FT
Restricted use with no heavy lifting of affected arm for 48 hours.  No submerging the arm in water for 48 hours.  You may start showering today.  Call your doctor for any bleeding, swelling, loss of sensation in the hand or fingers, or fingers turning blue.  If heavy bleeding or large lumps form, hold pressure at the spot and come to the Emergency Room.  - Bruising at the groin, sometimes extending down the leg, and/or a small lump under the skin at the groin access site is normal and will resolve within 2 – 3 weeks.   - Occasional skipped beats or palpitations that last for a few beats are common and generally resolve within 1-2 months.   - You may walk and take stairs at a regular pace.   - Do not perform any exercise more strenuous than walking for 1 week.   - Do not strain or lift heavy objects for 1 week.  - You may shower the day after the procedure.  - Do not soak in water (such as tub baths, hot tubs, swimming, etc.) for 1 week.   - You may resume all other activities the day after the procedure.  Call your doctor if:   - you notice bleeding, redness, drainage, swelling, increased tenderness or a hot sensation around the catheter insertion site.   - your temperature is greater than 100 degrees F for more than 24 hours.  - your rapid heart rhythm returns.  - you have any questions or concerns regarding the procedure.  If significant bleeding and/or a large lump (the size of a golf ball or bigger) occurs:  - Lie flat and apply continuous direct pressure just above the puncture site for at least 10 minutes  - If the issue resolves, notify your physician immediately.    - If the bleeding cannot be controlled, please seek immediate medical attention.  If you experience increased difficulty breathing or chest pain, or if you faint or have dizzy spells, please seek immediate medical attention.      No heavy lifting, driving, sex, tub baths, swimming, or any activity that submerges the lower half of the body in water for 48 hours.  Limited walking and stairs for 48 hours.    Change the bandaid after 24 hours and every 24 hours after that.  Keep the puncture site dry and covered with a bandaid until a scab forms.    Observe the site frequently.  If bleeding or a large lump (the size of a golf ball or bigger) occurs lie flat, apply continuous direct pressure just above the puncture site for at least 10 minutes, and notify your physician immediately.  If the bleeding cannot be controlled, call 911 immediately for assistance.  Notify your physician of pain, swelling or any drainage.    Notify your physician immediately if coldness, numbness, discoloration or pain in your foot occurs.    cardiac rehab info provided/referral and communication to cardiac rehab completed

## 2020-12-23 NOTE — PROGRESS NOTE ADULT - SUBJECTIVE AND OBJECTIVE BOX
pt s/p Cardiac cath done via RRA tolerated well   Revealed 2 vessel disease with need for rotoblade intervention   LAD and RCA     Medications during cardiac cath:   Versed 1mg   Fentanyl 50mcg   Heparin 4,000 units   NS 200cc  Omnipaque 65 cc        HPI:  70 year  man  PMH : GERD, HTN, HL Family history of CAD father . Pt with near syncope after starting Flomax. He took his BP at home and found his heart rate was irregular . He called his primary who instructed him to go to the ER . He was admitted 7/31-8/1 with palpitations. He was seen by cardiology and outpatient follow up was secured. On 8/4  patient seen by cardiology - . EKG done. Patient recommended to have Holter monitor , echo and NST. His NST done on 11/18/20 which revealed inferior wall ischemia   He was referred for cardiac cath      Nuclear Stress Test-Pharmacologic (11.18.20   IMPRESSIONS: Abnormal Study  * Chest Pain: No chest pain with administration of  Regadenoson.  * Symptom: Shortness of breath.  * HR Response: Appropriate.  * BP Response: Appropriate.  * Heart Rhythm: Normal Sinus Rhythm - 71 BPM.  * Baseline ECG: RBBB.  * ECG Abnormalities: There were no diagnostic changes.  * Arrhythmia: None.  * Small to Medium size fixed defect involving basal and  mid inferior wall with mild goldy infarct ischemia.  *Post-stress resting myocardial perfusion gated SPECT  imaging was performed (LVEF > 70%;LVEDV = 61 ml.)                    Vital Signs Last 24 Hrs  T(C): 36.4 (23 Dec 2020 08:45), Max: 36.4 (23 Dec 2020 08:45)  T(F): 97.5 (23 Dec 2020 08:45), Max: 97.5 (23 Dec 2020 08:45)  HR: 68 (23 Dec 2020 11:18) (65 - 80)  BP: 164/77 (23 Dec 2020 11:18) (146/73 - 179/80)  RR: 15 (23 Dec 2020 11:18) (15 - 16)  SpO2: 100% (23 Dec 2020 11:18) (97% - 100%)      Medications:  amLODIPine   Tablet 5 milliGRAM(s) Oral daily  aspirin  chewable 81 milliGRAM(s) Oral daily  atorvastatin 80 milliGRAM(s) Oral at bedtime  chlorhexidine 4% Liquid 1 Application(s) Topical Once  clopidogrel Tablet 600 milliGRAM(s) Oral once  metoprolol succinate ER 50 milliGRAM(s) Oral daily    Exam   General: A/ox 3, No acute Distress  Neck: Supple, NO JVD  Cardiac: S1 S2, No M/R/G  Pulmonary: CTAB, Breathing unlabored, No Rhonchi/Rales/Wheezing  Abdomen: Soft, Non -tender, +BS   Extremities: No Rashes, No edema  Radial site good capillary refill + pp no evidence of bleeding fingers warm and mobile   Neuro: A/o x 3, No focal deficits  Psch: normal mood , normal affect    LABS:                          13.5   7.47  )-----------( 277      ( 23 Dec 2020 08:46 )             40.9     12-23    142  |  104  |  20.0  ----------------------------<  133<H>  4.1   |  27.0  |  1.01    Ca    9.0      23 Dec 2020 08:46      PT/INR - ( 23 Dec 2020 08:46 )   PT: 12.6 sec;   INR: 1.09 ratio         PTT - ( 23 Dec 2020 08:46 )  PTT:30.1 sec           pt s/p Cardiac cath done via RRA tolerated well   Revealed 2 vessel disease with need for rotoblade intervention   Rotoblade LAD and RCA     Medications during cardiac cath:   Versed 1mg   Fentanyl 50mcg   Heparin 4,000 units   NS 200cc  Omnipaque 65 cc        HPI:  70 year  man  PMH : GERD, HTN, HL Family history of CAD father . Pt with near syncope after starting Flomax. He took his BP at home and found his heart rate was irregular . He called his primary who instructed him to go to the ER . He was admitted 7/31-8/1 with palpitations. He was seen by cardiology and outpatient follow up was secured. On 8/4  patient seen by cardiology - . EKG done. Patient recommended to have Holter monitor , echo and NST. His NST done on 11/18/20 which revealed inferior wall ischemia   He was referred for cardiac cath      Nuclear Stress Test-Pharmacologic (11.18.20   IMPRESSIONS: Abnormal Study  * Chest Pain: No chest pain with administration of  Regadenoson.  * Symptom: Shortness of breath.  * HR Response: Appropriate.  * BP Response: Appropriate.  * Heart Rhythm: Normal Sinus Rhythm - 71 BPM.  * Baseline ECG: RBBB.  * ECG Abnormalities: There were no diagnostic changes.  * Arrhythmia: None.  * Small to Medium size fixed defect involving basal and  mid inferior wall with mild goldy infarct ischemia.  *Post-stress resting myocardial perfusion gated SPECT  imaging was performed (LVEF > 70%;LVEDV = 61 ml.)          Vital Signs Last 24 Hrs  T(C): 36.4 (23 Dec 2020 08:45), Max: 36.4 (23 Dec 2020 08:45)  T(F): 97.5 (23 Dec 2020 08:45), Max: 97.5 (23 Dec 2020 08:45)  HR: 68 (23 Dec 2020 11:18) (65 - 80)  BP: 164/77 (23 Dec 2020 11:18) (146/73 - 179/80)  RR: 15 (23 Dec 2020 11:18) (15 - 16)  SpO2: 100% (23 Dec 2020 11:18) (97% - 100%)      Medications:  amLODIPine   Tablet 5 milliGRAM(s) Oral daily  aspirin  chewable 81 milliGRAM(s) Oral daily  atorvastatin 80 milliGRAM(s) Oral at bedtime  chlorhexidine 4% Liquid 1 Application(s) Topical Once  clopidogrel Tablet 600 milliGRAM(s) Oral once  metoprolol succinate ER 50 milliGRAM(s) Oral daily    Exam   General: A/ox 3, No acute Distress  Neck: Supple, NO JVD  Cardiac: S1 S2, No M/R/G  Pulmonary: CTAB, Breathing unlabored, No Rhonchi/Rales/Wheezing  Abdomen: Soft, Non -tender, +BS   Extremities: No Rashes, No edema  Radial site good capillary refill + pp no evidence of bleeding fingers warm and mobile   Neuro: A/o x 3, No focal deficits  Psch: normal mood , normal affect    LABS:                          13.5   7.47  )-----------( 277      ( 23 Dec 2020 08:46 )             40.9     12-23    142  |  104  |  20.0  ----------------------------<  133<H>  4.1   |  27.0  |  1.01    Ca    9.0      23 Dec 2020 08:46      PT/INR - ( 23 Dec 2020 08:46 )   PT: 12.6 sec;   INR: 1.09 ratio         PTT - ( 23 Dec 2020 08:46 )  PTT:30.1 sec

## 2020-12-23 NOTE — DISCHARGE NOTE PROVIDER - NSDCMRMEDTOKEN_GEN_ALL_CORE_FT
amLODIPine 5 mg oral tablet: 1 tab(s) orally once a day  aspirin 81 mg oral tablet, chewable: 1 tab(s) orally once a day  atorvastatin 80 mg oral tablet: 1 tab(s) orally once a day (at bedtime)  clopidogrel 75 mg oral tablet: 1 tab(s) orally once a day  metoprolol succinate 50 mg oral tablet, extended release: 1 tab(s) orally once a day   amLODIPine 5 mg oral tablet: 1 tab(s) orally once a day  aspirin 81 mg oral tablet, chewable: 1 tab(s) orally once a day  atorvastatin 20 mg oral tablet: 4 tab(s) orally once a day (at bedtime)   atorvastatin 80 mg oral tablet: 1 tab(s) orally once a day (at bedtime)  Brilinta (ticagrelor) 90 mg oral tablet: 1 tab(s) orally 2 times a day   clopidogrel 75 mg oral tablet: 1 tab(s) orally once a day  metoprolol succinate 50 mg oral tablet, extended release: 1 tab(s) orally once a day   amLODIPine 5 mg oral tablet: 1 tab(s) orally once a day  aspirin 81 mg oral tablet, chewable: 1 tab(s) orally once a day  atorvastatin 80 mg oral tablet: 1 tab(s) orally once a day (at bedtime)  clopidogrel 75 mg oral tablet: 1 tab(s) orally once a day - take everyday     metoprolol succinate 50 mg oral tablet, extended release: 1 tab(s) orally once a day  ticagrelor 90 mg oral tablet: 1 tab(s) orally 2 times a day  Please take two times a day until 1/22/21

## 2020-12-23 NOTE — DISCHARGE NOTE PROVIDER - HOSPITAL COURSE
s/p Cardiac cath  with 2 vessel disease significant calcification   s/p rotoblade intervention of LAD and intervention of RCA SOBEIDA s/p Cardiac cath  with 2 vessel disease significant calcification   s/p rotoblade intervention of LAD and intervention of RCA SOBEIDA   Post acute closure of LAD requiring urgent re- cath and another stent to LAD   pt to be on  ASA 81 mg po daily and Brilinta 90 mg  BID for one month   on the 1/18 pt will take Asprin 81 mg po daily and Brilinta 90 BID and Plavix 75 daily for 5 days   then he  will be on Plavix 75 mg po daily and Aspirin 81 mg po daily 70 year  man  PMH : GERD, HTN, HL Family history of CAD father . Pt with near syncope after starting Flomax. He took his BP at home and found his heart rate was irregular . He called his primary who instructed him to go to the ER . He was admitted 7/31-8/1 with palpitations. He was seen by cardiology and outpatient follow up was secured. On 8/4  patient seen by cardiology - . EKG done. Patient recommended to have Holter monitor , echo and NST. His NST done on 11/18/20 which revealed inferior wall ischemia. He was referred for cardiac cath. Patient underwent diagnostic angiogram and noted to have severe right coronary artery disease and left anterior descending artery disease now with stents to both arteries. Patient developed chest pain post cardiac cath 12/24/20 was taken back to lab urgently for acute stent thrombosis of left anterior descending artery. Post procedure echocardiogram showed global left ventricular systolic function was low normal, EF 50-55%, no evidence of pericardial effusion.  Patient stable from cardiac standpoint for discharge with outpatient cardiology follow up.     12/23/20 Diagnostic Cath:  s/p Cardiac cath  with 2 vessel disease significant calcification to RCA and LAD   12/24/20 s/p rotoblade intervention of LAD and intervention of RCA SOBEIDA   12/24/20 same day urgent cath for Chest Pain:  Post acute closure of LAD requiring urgent re- cath and another stent to LAD     Discharge Recommendations:  patient to be on  ASA 81 mg po daily and Brilinta 90 mg  BID for one month - until 1/17/21  then starting 1/18/21 patient will take Asprin 81 mg po daily and Brilinta 90 BID and Plavix 75 daily for 5 days - until 1/22/21  then starting 1/23/21 patient will be on Plavix 75 mg po daily and Aspirin 81 mg po daily     Outpatient follow up with Dr. Bonilla

## 2020-12-23 NOTE — DISCHARGE NOTE PROVIDER - NSDCCPTREATMENT_GEN_ALL_CORE_FT
PRINCIPAL PROCEDURE  Procedure: Left heart catheterization  Findings and Treatment:   12/23/20 Diagnostic Cath:  s/p Cardiac cath  with 2 vessel disease significant calcification to RCA and LAD   12/24/20 s/p rotoblade intervention of LAD and intervention of RCA SOBEIDA   12/24/20 same day urgent cath for Chest Pain:  Post acute closure of LAD requiring urgent re- cath and another stent to LAD   Discharge Recommendations:  patient to be on  ASA 81 mg po daily and Brilinta 90 mg  BID for one month - until 1/17/21  then starting 1/18/21 patient will take Asprin 81 mg po daily and Brilinta 90 BID and Plavix 75 daily for 5 days - until 1/22/21  then starting 1/23/21 patient will be on Plavix 75 mg po daily and Aspirin 81 mg po daily   Outpatient follow up with Dr. Bonilla

## 2020-12-23 NOTE — DISCHARGE NOTE PROVIDER - NSDCFUADDAPPT_GEN_ALL_CORE_FT
Restricted use with no heavy lifting of affected arm for 48 hours.  No submerging the arm in water for 48 hours.  You may start showering today.  Call your doctor for any bleeding, swelling, loss of sensation in the hand or fingers, or fingers turning blue.  If heavy bleeding or large lumps form, hold pressure at the spot and come to the Emergency Room.  - Bruising at the groin, sometimes extending down the leg, and/or a small lump under the skin at the groin access site is normal and will resolve within 2 – 3 weeks.   - Occasional skipped beats or palpitations that last for a few beats are common and generally resolve within 1-2 months.   - You may walk and take stairs at a regular pace.   - Do not perform any exercise more strenuous than walking for 1 week.   - Do not strain or lift heavy objects for 1 week.  - You may shower the day after the procedure.  - Do not soak in water (such as tub baths, hot tubs, swimming, etc.) for 1 week.   - You may resume all other activities the day after the procedure.  Call your doctor if:   - you notice bleeding, redness, drainage, swelling, increased tenderness or a hot sensation around the catheter insertion site.   - your temperature is greater than 100 degrees F for more than 24 hours.  - your rapid heart rhythm returns.  - you have any questions or concerns regarding the procedure.  If significant bleeding and/or a large lump (the size of a golf ball or bigger) occurs:  - Lie flat and apply continuous direct pressure just above the puncture site for at least 10 minutes  - If the issue resolves, notify your physician immediately.    - If the bleeding cannot be controlled, please seek immediate medical attention.  If you experience increased difficulty breathing or chest pain, or if you faint or have dizzy spells, please seek immediate medical attention.   Please call and make an appointment with Dr. Quiroga in 1-2 weeks. Also please follow with Dr. Tomlinson, call office to make an appointment.

## 2020-12-23 NOTE — DISCHARGE NOTE PROVIDER - NSDCCPCAREPLAN_GEN_ALL_CORE_FT
PRINCIPAL DISCHARGE DIAGNOSIS  Diagnosis: CAD, multiple vessel  Assessment and Plan of Treatment: patient with inferior wall ishcemia on nuclear stress test 11/18/20

## 2020-12-23 NOTE — DISCHARGE NOTE PROVIDER - CARE PROVIDER_API CALL
David Quiroga  CARDIOVASCULAR DISEASE  [address] [facility], [place] [address]  Phone: [phone]  Fax: [phone]  Follow Up Time:

## 2020-12-24 DIAGNOSIS — I25.118 ATHEROSCLEROTIC HEART DISEASE OF NATIVE CORONARY ARTERY WITH OTHER FORMS OF ANGINA PECTORIS: ICD-10-CM

## 2020-12-24 LAB
A1C WITH ESTIMATED AVERAGE GLUCOSE RESULT: 5.9 % — HIGH (ref 4–5.6)
ALBUMIN SERPL ELPH-MCNC: 4.2 G/DL — SIGNIFICANT CHANGE UP (ref 3.3–5.2)
ALP SERPL-CCNC: 91 U/L — SIGNIFICANT CHANGE UP (ref 40–120)
ALT FLD-CCNC: 16 U/L — SIGNIFICANT CHANGE UP
ANION GAP SERPL CALC-SCNC: 14 MMOL/L — SIGNIFICANT CHANGE UP (ref 5–17)
AST SERPL-CCNC: 17 U/L — SIGNIFICANT CHANGE UP
BASOPHILS # BLD AUTO: 0.04 K/UL — SIGNIFICANT CHANGE UP (ref 0–0.2)
BASOPHILS NFR BLD AUTO: 0.6 % — SIGNIFICANT CHANGE UP (ref 0–2)
BILIRUB SERPL-MCNC: 0.7 MG/DL — SIGNIFICANT CHANGE UP (ref 0.4–2)
BLD GP AB SCN SERPL QL: SIGNIFICANT CHANGE UP
BUN SERPL-MCNC: 16 MG/DL — SIGNIFICANT CHANGE UP (ref 8–20)
CALCIUM SERPL-MCNC: 9 MG/DL — SIGNIFICANT CHANGE UP (ref 8.6–10.2)
CHLORIDE SERPL-SCNC: 104 MMOL/L — SIGNIFICANT CHANGE UP (ref 98–107)
CHOLEST SERPL-MCNC: 169 MG/DL — SIGNIFICANT CHANGE UP
CO2 SERPL-SCNC: 25 MMOL/L — SIGNIFICANT CHANGE UP (ref 22–29)
CREAT SERPL-MCNC: 0.87 MG/DL — SIGNIFICANT CHANGE UP (ref 0.5–1.3)
EOSINOPHIL # BLD AUTO: 0.14 K/UL — SIGNIFICANT CHANGE UP (ref 0–0.5)
EOSINOPHIL NFR BLD AUTO: 2 % — SIGNIFICANT CHANGE UP (ref 0–6)
ESTIMATED AVERAGE GLUCOSE: 123 MG/DL — HIGH (ref 68–114)
GLUCOSE SERPL-MCNC: 125 MG/DL — HIGH (ref 70–99)
HCT VFR BLD CALC: 42.1 % — SIGNIFICANT CHANGE UP (ref 39–50)
HDLC SERPL-MCNC: 55 MG/DL — SIGNIFICANT CHANGE UP
HGB BLD-MCNC: 13.8 G/DL — SIGNIFICANT CHANGE UP (ref 13–17)
IMM GRANULOCYTES NFR BLD AUTO: 0.4 % — SIGNIFICANT CHANGE UP (ref 0–1.5)
LIPID PNL WITH DIRECT LDL SERPL: 100 MG/DL — HIGH
LYMPHOCYTES # BLD AUTO: 1.17 K/UL — SIGNIFICANT CHANGE UP (ref 1–3.3)
LYMPHOCYTES # BLD AUTO: 16.8 % — SIGNIFICANT CHANGE UP (ref 13–44)
MCHC RBC-ENTMCNC: 30 PG — SIGNIFICANT CHANGE UP (ref 27–34)
MCHC RBC-ENTMCNC: 32.8 GM/DL — SIGNIFICANT CHANGE UP (ref 32–36)
MCV RBC AUTO: 91.5 FL — SIGNIFICANT CHANGE UP (ref 80–100)
MONOCYTES # BLD AUTO: 0.65 K/UL — SIGNIFICANT CHANGE UP (ref 0–0.9)
MONOCYTES NFR BLD AUTO: 9.3 % — SIGNIFICANT CHANGE UP (ref 2–14)
NEUTROPHILS # BLD AUTO: 4.94 K/UL — SIGNIFICANT CHANGE UP (ref 1.8–7.4)
NEUTROPHILS NFR BLD AUTO: 70.9 % — SIGNIFICANT CHANGE UP (ref 43–77)
NON HDL CHOLESTEROL: 114 MG/DL — SIGNIFICANT CHANGE UP
PA ADP PRP-ACNC: 188 PRU — SIGNIFICANT CHANGE UP (ref 180–376)
PLATELET # BLD AUTO: 263 K/UL — SIGNIFICANT CHANGE UP (ref 150–400)
POTASSIUM SERPL-MCNC: 3.8 MMOL/L — SIGNIFICANT CHANGE UP (ref 3.5–5.3)
POTASSIUM SERPL-SCNC: 3.8 MMOL/L — SIGNIFICANT CHANGE UP (ref 3.5–5.3)
PROT SERPL-MCNC: 7 G/DL — SIGNIFICANT CHANGE UP (ref 6.6–8.7)
RBC # BLD: 4.6 M/UL — SIGNIFICANT CHANGE UP (ref 4.2–5.8)
RBC # FLD: 12.9 % — SIGNIFICANT CHANGE UP (ref 10.3–14.5)
SODIUM SERPL-SCNC: 143 MMOL/L — SIGNIFICANT CHANGE UP (ref 135–145)
TRIGL SERPL-MCNC: 71 MG/DL — SIGNIFICANT CHANGE UP
WBC # BLD: 6.97 K/UL — SIGNIFICANT CHANGE UP (ref 3.8–10.5)
WBC # FLD AUTO: 6.97 K/UL — SIGNIFICANT CHANGE UP (ref 3.8–10.5)

## 2020-12-24 PROCEDURE — 92933 PRQ TRLML C ATHRC ST ANGIOP1: CPT | Mod: LD

## 2020-12-24 PROCEDURE — 93010 ELECTROCARDIOGRAM REPORT: CPT | Mod: 76

## 2020-12-24 PROCEDURE — 99152 MOD SED SAME PHYS/QHP 5/>YRS: CPT | Mod: 59,78

## 2020-12-24 PROCEDURE — 93454 CORONARY ARTERY ANGIO S&I: CPT | Mod: 26,78

## 2020-12-24 PROCEDURE — 92978 ENDOLUMINL IVUS OCT C 1ST: CPT | Mod: 26,LD

## 2020-12-24 PROCEDURE — 93308 TTE F-UP OR LMTD: CPT | Mod: 26

## 2020-12-24 PROCEDURE — 93458 L HRT ARTERY/VENTRICLE ANGIO: CPT | Mod: 26,59

## 2020-12-24 PROCEDURE — 99232 SBSQ HOSP IP/OBS MODERATE 35: CPT | Mod: 25

## 2020-12-24 PROCEDURE — 92928 PRQ TCAT PLMT NTRAC ST 1 LES: CPT | Mod: 78,LD

## 2020-12-24 RX ORDER — ATORVASTATIN CALCIUM 80 MG/1
4 TABLET, FILM COATED ORAL
Qty: 120 | Refills: 1
Start: 2020-12-24 | End: 2021-02-21

## 2020-12-24 RX ORDER — TICAGRELOR 90 MG/1
90 TABLET ORAL ONCE
Refills: 0 | Status: COMPLETED | OUTPATIENT
Start: 2020-12-24 | End: 2020-12-24

## 2020-12-24 RX ORDER — PANTOPRAZOLE SODIUM 20 MG/1
40 TABLET, DELAYED RELEASE ORAL ONCE
Refills: 0 | Status: COMPLETED | OUTPATIENT
Start: 2020-12-24 | End: 2020-12-24

## 2020-12-24 RX ORDER — ONDANSETRON 8 MG/1
4 TABLET, FILM COATED ORAL ONCE
Refills: 0 | Status: DISCONTINUED | OUTPATIENT
Start: 2020-12-24 | End: 2020-12-24

## 2020-12-24 RX ORDER — CANGRELOR 50 MG/1
4 INJECTION, POWDER, LYOPHILIZED, FOR SOLUTION INTRAVENOUS
Qty: 50 | Refills: 0 | Status: DISCONTINUED | OUTPATIENT
Start: 2020-12-24 | End: 2020-12-25

## 2020-12-24 RX ORDER — NITROGLYCERIN 6.5 MG
0.4 CAPSULE, EXTENDED RELEASE ORAL
Refills: 0 | Status: COMPLETED | OUTPATIENT
Start: 2020-12-24 | End: 2020-12-24

## 2020-12-24 RX ORDER — SODIUM CHLORIDE 9 MG/ML
500 INJECTION INTRAMUSCULAR; INTRAVENOUS; SUBCUTANEOUS ONCE
Refills: 0 | Status: COMPLETED | OUTPATIENT
Start: 2020-12-24 | End: 2020-12-24

## 2020-12-24 RX ORDER — ONDANSETRON 8 MG/1
4 TABLET, FILM COATED ORAL ONCE
Refills: 0 | Status: COMPLETED | OUTPATIENT
Start: 2020-12-24 | End: 2020-12-24

## 2020-12-24 RX ORDER — TICAGRELOR 90 MG/1
1 TABLET ORAL
Qty: 180 | Refills: 1
Start: 2020-12-24 | End: 2021-06-21

## 2020-12-24 RX ORDER — TICAGRELOR 90 MG/1
90 TABLET ORAL
Refills: 0 | Status: DISCONTINUED | OUTPATIENT
Start: 2020-12-25 | End: 2020-12-25

## 2020-12-24 RX ORDER — ACETAMINOPHEN 500 MG
1000 TABLET ORAL ONCE
Refills: 0 | Status: COMPLETED | OUTPATIENT
Start: 2020-12-24 | End: 2020-12-24

## 2020-12-24 RX ADMIN — ATORVASTATIN CALCIUM 80 MILLIGRAM(S): 80 TABLET, FILM COATED ORAL at 22:04

## 2020-12-24 RX ADMIN — CLOPIDOGREL BISULFATE 75 MILLIGRAM(S): 75 TABLET, FILM COATED ORAL at 07:07

## 2020-12-24 RX ADMIN — Medication 0.4 MILLIGRAM(S): at 11:20

## 2020-12-24 RX ADMIN — Medication 0.4 MILLIGRAM(S): at 11:53

## 2020-12-24 RX ADMIN — Medication 0.4 MILLIGRAM(S): at 11:45

## 2020-12-24 RX ADMIN — ONDANSETRON 4 MILLIGRAM(S): 8 TABLET, FILM COATED ORAL at 11:16

## 2020-12-24 RX ADMIN — Medication 50 MILLIGRAM(S): at 06:02

## 2020-12-24 RX ADMIN — SODIUM CHLORIDE 500 MILLILITER(S): 9 INJECTION INTRAMUSCULAR; INTRAVENOUS; SUBCUTANEOUS at 11:30

## 2020-12-24 RX ADMIN — TICAGRELOR 90 MILLIGRAM(S): 90 TABLET ORAL at 22:04

## 2020-12-24 RX ADMIN — AMLODIPINE BESYLATE 5 MILLIGRAM(S): 2.5 TABLET ORAL at 06:02

## 2020-12-24 RX ADMIN — Medication 400 MILLIGRAM(S): at 14:29

## 2020-12-24 RX ADMIN — PANTOPRAZOLE SODIUM 40 MILLIGRAM(S): 20 TABLET, DELAYED RELEASE ORAL at 09:34

## 2020-12-24 RX ADMIN — Medication 81 MILLIGRAM(S): at 07:07

## 2020-12-24 RX ADMIN — Medication 30 MILLILITER(S): at 19:55

## 2020-12-24 RX ADMIN — Medication 1000 MILLIGRAM(S): at 15:00

## 2020-12-24 NOTE — CONSULT NOTE ADULT - SUBJECTIVE AND OBJECTIVE BOX
70 year  man  PMH : GERD, HTN, HL Family history of CAD father . Pt with near syncope after starting Flomax. He took his BP at home and found his heart rate was irregular . He called his primary who instructed him to go to the ER . He was admitted 7/31-8/1 with palpitations. He was seen by cardiology and outpatient follow up was secured. On 8/4  patient seen by cardiology - . EKG done. Patient recommended to have Holter monitor , echo and NST. His NST done on 11/18/20 which revealed inferior wall ischemia   He was referred for cardiac cath     Pt had a right radial cath yesterday that demonstrated severe RCA and LAD disease.  He went back to cath today for rotoblade atherectomy of RCA and LAD.  He received stent to LAD.  After procedure pt c/o severe CP and was taken back to cath lab and found to have occlusion of LAD stent which was restented.  Pt is currently on Cangrelor drip.  He c/o 2-3/10 chest pain at present.  Cath team aware.  Patient is a 70y old  Male who presents with a chief complaint of chest pain (24 Dec 2020 12:11)      PAST MEDICAL & SURGICAL HISTORY:  Hypercholesterolemia    GERD (gastroesophageal reflux disease)    BPH (Benign Prostatic Hyperplasia)    HTN (hypertension)    S/P inguinal hernia repair    S/P cholecystectomy        Review of Systems:  CONSTITUTIONAL: No fever, chills, or fatigue  EYES: No eye pain, visual disturbances, or discharge  ENMT:  No difficulty hearing, tinnitus, vertigo; No sinus or throat pain  NECK: No pain or stiffness  RESPIRATORY: No cough, wheezing, chills or hemoptysis; No shortness of breath  CARDIOVASCULAR: chest pain as above  GASTROINTESTINAL: No abdominal or epigastric pain. No nausea, vomiting, or hematemesis; No diarrhea or constipation. No melena or hematochezia.  GENITOURINARY: No dysuria, frequency, hematuria, or incontinence  NEUROLOGICAL: No headaches, memory loss, loss of strength, numbness, or tremors  SKIN: No itching, burning, rashes, or lesions   MUSCULOSKELETAL: No joint pain or swelling; No muscle, back, or extremity pain  PSYCHIATRIC: No depression, anxiety, mood swings, or difficulty sleeping      Medications:    amLODIPine   Tablet 5 milliGRAM(s) Oral daily  metoprolol succinate ER 50 milliGRAM(s) Oral daily      acetaminophen  IVPB .. 1000 milliGRAM(s) IV Intermittent once      aspirin  chewable 81 milliGRAM(s) Oral daily  cangrelor Infusion 4 MICROgram(s)/kG/Min IV Continuous <Continuous>  ticagrelor 90 milliGRAM(s) Oral once        atorvastatin 80 milliGRAM(s) Oral at bedtime        chlorhexidine 4% Liquid 1 Application(s) Topical Once      ICU Vital Signs Last 24 Hrs  T(C): 36.6 (23 Dec 2020 19:31), Max: 36.6 (23 Dec 2020 19:31)  T(F): 97.9 (23 Dec 2020 19:31), Max: 97.9 (23 Dec 2020 19:31)  HR: 68 (24 Dec 2020 14:15) (52 - 78)  BP: 142/72 (24 Dec 2020 14:15) (122/74 - 162/89)  BP(mean): 90 (24 Dec 2020 14:15) (90 - 100)  ABP: --  ABP(mean): --  RR: 8 (24 Dec 2020 14:15) (8 - 19)  SpO2: 98% (24 Dec 2020 14:15) (96% - 100%)                LABS:                        13.8   6.97  )-----------( 263      ( 24 Dec 2020 06:02 )             42.1     12-24    143  |  104  |  16.0  ----------------------------<  125<H>  3.8   |  25.0  |  0.87    Ca    9.0      24 Dec 2020 06:02    TPro  7.0  /  Alb  4.2  /  TBili  0.7  /  DBili  x   /  AST  17  /  ALT  16  /  AlkPhos  91  12-24          CAPILLARY BLOOD GLUCOSE        PT/INR - ( 23 Dec 2020 08:46 )   PT: 12.6 sec;   INR: 1.09 ratio         PTT - ( 23 Dec 2020 08:46 )  PTT:30.1 sec    CULTURES:      Physical Examination:    General: No acute distress.  Alert, oriented, interactive, nonfocal    HEENT: Pupils equal, reactive to light.  Symmetric.    PULM: Clear to auscultation bilaterally, no significant sputum production    CVS: Regular rate and rhythm, no murmurs, rubs, or gallops    ABD: Soft, nondistended, nontender, normoactive bowel sounds, no masses    EXT: No edema, nontender, b/l groin right soft/dressing clean, left groin small blood on dressing, no hematoma, distal pulses intact    SKIN: Warm and well perfused, no rashes noted.    RADIOLOGY: images reviewed    CRITICAL CARE TIME SPENT: ***

## 2020-12-24 NOTE — PROGRESS NOTE ADULT - SUBJECTIVE AND OBJECTIVE BOX
Pt s/p Cardiac cath and intervention of RCA and LAD done via RFA/RFV   RCA one SOBEIDA to 70 % lesion  LAD rotoblade intervention and one SOBEIDA to 70% lesion   medications during cardiac cath  Versed   Fentanyl   heparin  Omnipaque  NS          HPI:  70 year  man  PMH : GERD, HTN, HL Family history of CAD father . Pt with near syncope after starting Flomax. He took his BP at home and found his heart rate was irregular . He called his primary who instructed him to go to the ER . He was admitted - with palpitations. He was seen by cardiology and outpatient follow up was secured. On   patient seen by cardiology - . EKG done. Patient recommended to have Holter monitor , echo and NST. His NST done on 20 which revealed inferior wall ischemia   He was referred for cardiac cath      Nuclear Stress Test-Pharmacologic (20   IMPRESSIONS: Abnormal Study  * Chest Pain: No chest pain with administration of  Regadenoson.  * Symptom: Shortness of breath.  * HR Response: Appropriate.  * BP Response: Appropriate.  * Heart Rhythm: Normal Sinus Rhythm - 71 BPM.  * Baseline ECG: RBBB.  * ECG Abnormalities: There were no diagnostic changes.  * Arrhythmia: None.  * Small to Medium size fixed defect involving basal and  mid inferior wall with mild goldy infarct ischemia.  *Post-stress resting myocardial perfusion gated SPECT  imaging was performed (LVEF > 70%;LVEDV = 61 ml.)    Conclusion:  Small to Medium size fixed defect involving basal and mid  inferior wall with mild goldy infarct ischemia.  ------------------------------------------------------------------------                Vital Signs Last 24 Hrs  T(C): 36.6 (23 Dec 2020 19:31), Max: 36.6 (23 Dec 2020 19:31)  T(F): 97.9 (23 Dec 2020 19:31), Max: 97.9 (23 Dec 2020 19:31)  HR: 59 (24 Dec 2020 09:35) (59 - 78)  BP: 152/83 (24 Dec 2020 09:35) (128/68 - 170/80)  RR: 16 (24 Dec 2020 09:35) (15 - 17)  SpO2: 99% (24 Dec 2020 09:35) (96% - 100%)      Medications:  amLODIPine   Tablet 5 milliGRAM(s) Oral daily  aspirin  chewable 81 milliGRAM(s) Oral daily  atorvastatin 80 milliGRAM(s) Oral at bedtime  chlorhexidine 4% Liquid 1 Application(s) Topical Once  clopidogrel Tablet 75 milliGRAM(s) Oral daily  metoprolol succinate ER 50 milliGRAM(s) Oral daily      General: A/ox 3, No acute Distress  Neck: Supple, NO JVD  Cardiac: S1 S2, No M/R/G  Pulmonary: CTAB, Breathing unlabored, No Rhonchi/Rales/Wheezing  Abdomen: Soft, Non -tender, +BS   Extremities: No Rashes, No edema  Radial site good capillary refill + pp no evidence of bleeding fingers warm and mobile   Right Femoral cath site no evidence of bleeding + pp hemostasis maintained with Angio seal   Neuro: A/o x 3, No focal deficits  Psch: normal mood , normal affect    LABS:                          13.8   6.97  )-----------( 263      ( 24 Dec 2020 06:02 )             42.1     12-24    143  |  104  |  16.0  ----------------------------<  125<H>  3.8   |  25.0  |  0.87    Ca    9.0      24 Dec 2020 06:02    TPro  7.0  /  Alb  4.2  /  TBili  0.7  /  DBili  x   /  AST  17  /  ALT  16  /  AlkPhos  91  12-24    PT/INR - ( 23 Dec 2020 08:46 )   PT: 12.6 sec;   INR: 1.09 ratio         PTT - ( 23 Dec 2020 08:46 )  PTT:30.1 sec    Cardiac Cath Lab - Adult:   [facility]  Department of Cardiology  [address] [place] [id number]  [phone]  Cath Lab Report -- Comprehensive Report  Patient: NAVI UPTON  Study date: 2020  Account number: [id number]  MR number: [id number]  : 1950  Gender: Male  Race: W  Case Physician(s):  Primitivo Tomlinson MD  Referring Physician:  JENIFER GEORGES  INDICATIONS: Stable angina - CCS2. Abnormal stress test.  HISTORY: Patient wiht history of hypertension presenting with high risk  abnormal nuclear stress test. Inferior infarct with ischemia. Frequent  PVCs and syncopal event.  PROCEDURE:  --  Left heart catheterization with ventriculography.  --  Right coronary angiography.  --  Left coronary angiography.  TECHNIQUE: The risks and alternatives of the procedures and conscious  sedation were explained to the patient and informed consent was obtained.  Local anesthetic given. Right radial artery access. Left heart  catheterization. Ventriculography was performed. Right coronary artery  angiography. The vessel was injected utilizing a catheter. Left coronary  artery angiography. The vessel was injected utilizing a catheter.  RADIATION EXPOSURE: 3.7 min.  CONTRAST GIVEN: Omnipaque 65 ml.  MEDICATIONS GIVEN: Midazolam, 1 mg, IV. Fentanyl, 50 mcg, IV. Verapamil  (Isoptin, Calan, Covera), 5 mg, IA. Heparin, 4000 units, IA. 1% Lidocaine,  10 ml, subcutaneously. 0.9NS, 200 ml, IV.  VENTRICLES: Global left ventricular function was normal. EF calculated by  contrast ventriculography was 60 %.  CORONARY VESSELS: The coronary circulation is co-dominant.  LM:   --  LM: Normal.  LAD:   --  Mid LAD: There was a 70 % stenosis. The lesion was heavily  calcified.  CX:   --  OM1: There was a 30 % stenosis.  RCA:   --  Proximal RCA: There was a 95 % stenosis. In a second lesion,  there was a 70 % stenosis.  --  RPDA: There was a 99 % stenosis.  COMPLICATIONS: No complications occurred during the cath lab visit.  DIAGNOSTIC IMPRESSIONS: Severe RCA and LAD disease.  Normal LV function.  DIAGNOSTIC RECOMMENDATIONS: Discussed surgical intervention vs PCI.  Low to Intermediate syntax score.  Plan for femoral approach and rotational atherectomy and PCI of LAD and RCA  tomorrow.  Prepared and signed by  Primitivo Tomlinson MD  Signed 2020 18:01:07  HEMODYNAMIC TABLES  Pressures:  Baseline  Pressures:  - HR: 90  Pressures:  - Rhythm:  Pressures:  -- Aortic Pressure (S/D/M): 151/79/111  Outputs:  Baseline  Outputs:  -- CALCULATIONS[age]  Outputs:  -- CALCULATIONS: Body Surface Area: 1.97  Outputs:  -- CALCULATIONS: Height in cm: 178.00  Outputs:  -- CALCULATIONS: Sex: Male  Outputs:  -- CALCULATIONS: Weight in k.40  Outputs:  -- OUTPUTS: O2 consumption: 246.75  Outputs:  -- OUTPUTS: Vo2 Indexed: 125.00 (20 @ 09:48)         Pt s/p Cardiac cath and intervention of RCA and LAD done via RFA/RFV   RCA one SOBEIDA to 70 % lesion  LAD rotoblade intervention and one SOBEIDA to 70% lesion   medications during cardiac cath  Versed 2mg  Fentanyl  100 mcg  heparin 6000   Omnipaque 120           HPI:  70 year  man  PMH : GERD, HTN, HL Family history of CAD father . Pt with near syncope after starting Flomax. He took his BP at home and found his heart rate was irregular . He called his primary who instructed him to go to the ER . He was admitted - with palpitations. He was seen by cardiology and outpatient follow up was secured. On   patient seen by cardiology - . EKG done. Patient recommended to have Holter monitor , echo and NST. His NST done on 20 which revealed inferior wall ischemia   He was referred for cardiac cath      Nuclear Stress Test-Pharmacologic (20   IMPRESSIONS: Abnormal Study  * Chest Pain: No chest pain with administration of  Regadenoson.  * Symptom: Shortness of breath.  * HR Response: Appropriate.  * BP Response: Appropriate.  * Heart Rhythm: Normal Sinus Rhythm - 71 BPM.  * Baseline ECG: RBBB.  * ECG Abnormalities: There were no diagnostic changes.  * Arrhythmia: None.  * Small to Medium size fixed defect involving basal and  mid inferior wall with mild goldy infarct ischemia.  *Post-stress resting myocardial perfusion gated SPECT  imaging was performed (LVEF > 70%;LVEDV = 61 ml.)    Conclusion:  Small to Medium size fixed defect involving basal and mid  inferior wall with mild goldy infarct ischemia.  ------------------------------------------------------------------------                Vital Signs Last 24 Hrs  T(C): 36.6 (23 Dec 2020 19:31), Max: 36.6 (23 Dec 2020 19:31)  T(F): 97.9 (23 Dec 2020 19:31), Max: 97.9 (23 Dec 2020 19:31)  HR: 59 (24 Dec 2020 09:35) (59 - 78)  BP: 152/83 (24 Dec 2020 09:35) (128/68 - 170/80)  RR: 16 (24 Dec 2020 09:35) (15 - 17)  SpO2: 99% (24 Dec 2020 09:35) (96% - 100%)      Medications:  amLODIPine   Tablet 5 milliGRAM(s) Oral daily  aspirin  chewable 81 milliGRAM(s) Oral daily  atorvastatin 80 milliGRAM(s) Oral at bedtime  chlorhexidine 4% Liquid 1 Application(s) Topical Once  clopidogrel Tablet 75 milliGRAM(s) Oral daily  metoprolol succinate ER 50 milliGRAM(s) Oral daily      General: A/ox 3, No acute Distress  Neck: Supple, NO JVD  Cardiac: S1 S2, No M/R/G  Pulmonary: CTAB, Breathing unlabored, No Rhonchi/Rales/Wheezing  Abdomen: Soft, Non -tender, +BS   Extremities: No Rashes, No edema  Radial site good capillary refill + pp no evidence of bleeding fingers warm and mobile   Right Femoral cath site no evidence of bleeding + pp hemostasis maintained with Angio seal   Neuro: A/o x 3, No focal deficits  Psch: normal mood , normal affect    LABS:                          13.8   6.97  )-----------( 263      ( 24 Dec 2020 06:02 )             42.1     12-24    143  |  104  |  16.0  ----------------------------<  125<H>  3.8   |  25.0  |  0.87    Ca    9.0      24 Dec 2020 06:02    TPro  7.0  /  Alb  4.2  /  TBili  0.7  /  DBili  x   /  AST  17  /  ALT  16  /  AlkPhos  91  12-24    PT/INR - ( 23 Dec 2020 08:46 )   PT: 12.6 sec;   INR: 1.09 ratio         PTT - ( 23 Dec 2020 08:46 )  PTT:30.1 sec    Cardiac Cath Lab - Adult:   [facility]  Department of Cardiology  [address] [place] [address]  [phone]  Cath Lab Report -- Comprehensive Report  Patient: NAVI UPTON  Study date: 2020  Account number: [id number]  MR number: [id number]  : 1950  Gender: Male  Race: W  Case Physician(s):  Primitivo Tomlinson MD  Referring Physician:  JENIFER GEORGES  INDICATIONS: Stable angina - CCS2. Abnormal stress test.  HISTORY: Patient wiht history of hypertension presenting with high risk  abnormal nuclear stress test. Inferior infarct with ischemia. Frequent  PVCs and syncopal event.  PROCEDURE:  --  Left heart catheterization with ventriculography.  --  Right coronary angiography.  --  Left coronary angiography.  TECHNIQUE: The risks and alternatives of the procedures and conscious  sedation were explained to the patient and informed consent was obtained.  Local anesthetic given. Right radial artery access. Left heart  catheterization. Ventriculography was performed. Right coronary artery  angiography. The vessel was injected utilizing a catheter. Left coronary  artery angiography. The vessel was injected utilizing a catheter.  RADIATION EXPOSURE: 3.7 min.  CONTRAST GIVEN: Omnipaque 65 ml.  MEDICATIONS GIVEN: Midazolam, 1 mg, IV. Fentanyl, 50 mcg, IV. Verapamil  (Isoptin, Calan, Covera), 5 mg, IA. Heparin, 4000 units, IA. 1% Lidocaine,  10 ml, subcutaneously. 0.9NS, 200 ml, IV.  VENTRICLES: Global left ventricular function was normal. EF calculated by  contrast ventriculography was 60 %.  CORONARY VESSELS: The coronary circulation is co-dominant.  LM:   --  LM: Normal.  LAD:   --  Mid LAD: There was a 70 % stenosis. The lesion was heavily  calcified.  CX:   --  OM1: There was a 30 % stenosis.  RCA:   --  Proximal RCA: There was a 95 % stenosis. In a second lesion,  there was a 70 % stenosis.  --  RPDA: There was a 99 % stenosis.  COMPLICATIONS: No complications occurred during the cath lab visit.  DIAGNOSTIC IMPRESSIONS: Severe RCA and LAD disease.  Normal LV function.  DIAGNOSTIC RECOMMENDATIONS: Discussed surgical intervention vs PCI.  Low to Intermediate syntax score.  Plan for femoral approach and rotational atherectomy and PCI of LAD and RCA  tomorrow.  Prepared and signed by  Primitivo Tomlinson MD  Signed 2020 18:01:07  HEMODYNAMIC TABLES  Pressures:  Baseline  Pressures:  - HR: 90  Pressures:  - Rhythm:  Pressures:  -- Aortic Pressure (S/D/M): 151/79/111  Outputs:  Baseline  Outputs:  -- CALCULATIONS[age]  Outputs:  -- CALCULATIONS: Body Surface Area: 1.97  Outputs:  -- CALCULATIONS: Height in cm: 178.00  Outputs:  -- CALCULATIONS: Sex: Male  Outputs:  -- CALCULATIONS: Weight in k.40  Outputs:  -- OUTPUTS: O2 consumption: 246.75  Outputs:  -- OUTPUTS: Vo2 Indexed: 125.00 (20 @ 09:48)

## 2020-12-24 NOTE — PROGRESS NOTE ADULT - SUBJECTIVE AND OBJECTIVE BOX
Pt c/o 6- 8 /10 chest pain radiating to Left arm  with nausea   StaT EKG with changes in Inferior leads   Zofran 4 mg IV given   Treated with NTG Sl x3 with no relief   Stat echo and  Dr Tomlinson at bedside    Echo abnormal   Pt with progressive chest pain   Return to cath lab for exploratory  vessel    Pt c/o 6- 8 /10 chest pain radiating to Left arm  with nausea   StaT EKG with changes in Inferior leads   Zofran 4 mg IV given   Treated with NTG Sl x3 with no relief   Stat echo and  Dr Tomlinson at bedside    Echo abnormal   started briefly on heparin drip no bolus   Pt with progressive chest pain   Return to cath lab for exploratory  vessel   a/p LAD occluded     Pt c/o 6- 8 /10 chest pain radiating to Left arm  with nausea   StaT EKG with changes in Inferior leads and V leads   Zofran 4 mg IV given  Treated with NTG Sl x3 with no relief   Stat echo and  Dr Tomlinson at bedside    Echo abnormal   started briefly on heparin drip no bolus   Pt with progressive chest pain and nausea    Return to cath lab for exploratory of vessel concern for stent closure and dissection   Medications During cardiac cath   Versed0.5mg   Fentanyl 25mg   Heparin 6000 units   Nitroglycerin   Cangrelor bolus and drip started at 1230 in cath lab   Brilinta 180 mg po     MEDICATIONS  (STANDING):  acetaminophen  IVPB .. 1000 milliGRAM(s) IV Intermittent once  amLODIPine   Tablet 5 milliGRAM(s) Oral daily  aspirin  chewable 81 milliGRAM(s) Oral daily  atorvastatin 80 milliGRAM(s) Oral at bedtime  cangrelor Infusion 4 MICROgram(s)/kG/Min (95.3 mL/Hr) IV Continuous <Continuous>  chlorhexidine 4% Liquid 1 Application(s) Topical Once  metoprolol succinate ER 50 milliGRAM(s) Oral daily  ticagrelor 90 milliGRAM(s) Oral once    ICU Vital Signs Last 24 Hrs  T(C): 36.6 (23 Dec 2020 19:31), Max: 36.6 (23 Dec 2020 19:31)  T(F): 97.9 (23 Dec 2020 19:31), Max: 97.9 (23 Dec 2020 19:31)  HR: 69 (24 Dec 2020 14:00) (52 - 78)  BP: 138/86 (24 Dec 2020 14:00) (122/74 - 162/89)  BP(mean): 100 (24 Dec 2020 14:00) (91 - 100)  RR: 19 (24 Dec 2020 14:00) (15 - 19)  SpO2: 100% (24 Dec 2020 14:00) (96% - 100%)      Physical Exam:   General: No distress. Comfortable.  HEENT: EOM intact.  Neck: Neck supple. JVP not elevated. No masses  Chest: Clear to auscultation bilaterally  CV: s1 s2 RRR no murmurs, rubs or gallops   Abdomen: Soft, non-distended, non-tender  Extremity : + PP no c/c/e   R/L groin femoral site soft non tender no evidence of bleeding   Skin: No rashes or skin breakdown  Neurology: Alert and oriented times three. Sensation intact  Psych: Affect normal      70 year  man  PMH : GERD, HTN, HL Family history of CAD father . Pt with near syncope after starting Flomax. He took his BP at home and found his heart rate was irregular . He called his primary who instructed him to go to the ER . He was admitted 7/31-8/1 with palpitations. He was seen by cardiology and outpatient follow up was secured. On 8/4  patient seen by cardiology - . EKG done. Patient recommended to have Holter monitor , echo and NST. His NST done on 11/18/20 which revealed inferior wall ischemia   He was referred for cardiac cath s/p Cardiac cath on 12/23/20  revealed 2 vessel disease and need for rotoblade intervention  on 12/24. he was admitted to Hospital due to Comorbidities and need for staged procedure He was Loaded Plavix 600 mg po today then 75 mg po daily   He was taken to the cath lab today for planned intervention of LAD and RCA  done via RFA completed at 930 am . He tolerated procedure well for several hours and at  approximately 11:15 he reported MSCP 6/10 with associated nauseas  that was treated with Zofran , 3 SL Nitroglycerin with no relief. A stat echo and ekg where done with concern for distal clot or stent closure  with increasing chest pain.. He was taken urgently back into cath lab and found to have LAD closed   He was started on Cangrelor drip/ reloaded with Brilinta and received another SOBEIDA to LAD . His RCA stent was patent with no issue . He was transferred to MICU for further management and observation     a/p Acute stent closure   Patent RCA stent LAD  stent with dissection and abruptly closed reopened with SOBEIDA to LAD  done Via LFA   Admit to MICU ( d/w Dr Waddell)   Post orders and observations   groin precautions Bilateral angio-seal   May sit up at 1500    Bedrest  till am   Continue Cangrelor drip till 1430   Brilinta 90 mg po at 2200 tonight then   Brilinta 90 mg po BID starting 12/25  ASA 81 mg po daily   Atorvastatin 80 mg po daily   Metoprolol 50 daily   amlodipine 5 mg po daily   ekg on arrival to floor  EKG prn pain and in AM   AM labs CMP/CBC   Reviewed case with Dr Tomlinson            Pt c/o 6- 8 /10 chest pain radiating to Left arm  with nausea   StaT EKG with changes in Inferior leads and V leads   Zofran 4 mg IV given  Treated with NTG Sl x3 with no relief   Stat echo and  Dr Tomlinson at bedside    Echo abnormal   started briefly on heparin drip no bolus   Pt with progressive chest pain and nausea    Return to cath lab for exploratory of vessel concern for stent closure and dissection   Medications During cardiac cath   Versed0.5mg   Fentanyl 25mg   Heparin 6000 units   Nitroglycerin   Cangrelor bolus and drip started at 1230 in cath lab   Brilinta 180 mg po     MEDICATIONS  (STANDING):  acetaminophen  IVPB .. 1000 milliGRAM(s) IV Intermittent once  amLODIPine   Tablet 5 milliGRAM(s) Oral daily  aspirin  chewable 81 milliGRAM(s) Oral daily  atorvastatin 80 milliGRAM(s) Oral at bedtime  cangrelor Infusion 4 MICROgram(s)/kG/Min (95.3 mL/Hr) IV Continuous <Continuous>  chlorhexidine 4% Liquid 1 Application(s) Topical Once  metoprolol succinate ER 50 milliGRAM(s) Oral daily  ticagrelor 90 milliGRAM(s) Oral once    ICU Vital Signs Last 24 Hrs  T(C): 36.6 (23 Dec 2020 19:31), Max: 36.6 (23 Dec 2020 19:31)  T(F): 97.9 (23 Dec 2020 19:31), Max: 97.9 (23 Dec 2020 19:31)  HR: 69 (24 Dec 2020 14:00) (52 - 78)  BP: 138/86 (24 Dec 2020 14:00) (122/74 - 162/89)  BP(mean): 100 (24 Dec 2020 14:00) (91 - 100)  RR: 19 (24 Dec 2020 14:00) (15 - 19)  SpO2: 100% (24 Dec 2020 14:00) (96% - 100%)      Physical Exam:   General: No distress. Comfortable.  HEENT: EOM intact.  Neck: Neck supple. JVP not elevated. No masses  Chest: Clear to auscultation bilaterally  CV: s1 s2 RRR no murmurs, rubs or gallops   Abdomen: Soft, non-distended, non-tender  Extremity : + PP no c/c/e   R/L groin femoral site soft non tender no evidence of bleeding   Skin: No rashes or skin breakdown  Neurology: Alert and oriented times three. Sensation intact  Psych: Affect normal  c    70 year  man  PMH : GERD, HTN, HL Family history of CAD father . Pt with near syncope after starting Flomax. He took his BP at home and found his heart rate was irregular . He called his primary who instructed him to go to the ER . He was admitted 7/31-8/1 with palpitations. He was seen by cardiology and outpatient follow up was secured. On 8/4  patient seen by cardiology - . EKG done. Patient recommended to have Holter monitor , echo and NST. His NST done on 11/18/20 which revealed inferior wall ischemia   He was referred for cardiac cath s/p Cardiac cath on 12/23/20  revealed 2 vessel disease and need for rotoblade intervention  on 12/24. he was admitted to Hospital due to Comorbidities and need for staged procedure He was Loaded Plavix 600 mg po today then 75 mg po daily   He was taken to the cath lab today for planned intervention of LAD and RCA  done via RFA completed at 930 am . He tolerated procedure well for several hours and at  approximately 11:15 he reported MSCP 6/10 with associated nauseas  that was treated with Zofran , 3 SL Nitroglycerin with no relief. A stat echo and ekg where done with concern for distal clot or stent closure  with increasing chest pain.. He was taken urgently back into cath lab and found to have LAD closed   He was started on Cangrelor drip/ reloaded with Brilinta and received another SOBEIDA to LAD . His RCA stent was patent with no issue . He was transferred to MICU for further management and observation   Currently remains with some residual chest discomfort with no EKG changes  treated with IV Tylenol     a/p Acute stent closure   Patent RCA stent LAD  stent with dissection and abruptly closed reopened with SOBEIDA to LAD  done Via LFA   Admit to MICU ( d/w Dr Waddell)   Post orders and observations   groin precautions Bilateral angio-seal   May sit up at 1500    Bedrest  till am   Continue Cangrelor drip till 1430   Brilinta 90 mg po at 2200 tonight then   Brilinta 90 mg po BID starting 12/25  ASA 81 mg po daily   Atorvastatin 80 mg po daily   Metoprolol 50 daily   amlodipine 5 mg po daily   ekg on arrival to floor  EKG prn pain and in AM   AM labs CMP/CBC   Reviewed case with Dr Tomlinson            Pt c/o 6- 8 /10 chest pain radiating to Left arm  with nausea   StaT EKG with changes in Inferior leads and V leads   Zofran 4 mg IV given  Treated with NTG Sl x3 with no relief   Stat echo and  Dr Tomlinson at bedside    Echo abnormal   started briefly on heparin drip no bolus   Pt with progressive chest pain and nausea    Return to cath lab for exploratory of vessel concern for stent closure and dissection   Medications During cardiac cath   Versed0.5mg   Fentanyl 25mg   Heparin 6000 units   Nitroglycerin   Cangrelor bolus and drip started at 1230 in cath lab   Brilinta 180 mg po     MEDICATIONS  (STANDING):  acetaminophen  IVPB .. 1000 milliGRAM(s) IV Intermittent once  amLODIPine   Tablet 5 milliGRAM(s) Oral daily  aspirin  chewable 81 milliGRAM(s) Oral daily  atorvastatin 80 milliGRAM(s) Oral at bedtime  cangrelor Infusion 4 MICROgram(s)/kG/Min (95.3 mL/Hr) IV Continuous <Continuous>  chlorhexidine 4% Liquid 1 Application(s) Topical Once  metoprolol succinate ER 50 milliGRAM(s) Oral daily  ticagrelor 90 milliGRAM(s) Oral once    ICU Vital Signs Last 24 Hrs  T(C): 36.6 (23 Dec 2020 19:31), Max: 36.6 (23 Dec 2020 19:31)  T(F): 97.9 (23 Dec 2020 19:31), Max: 97.9 (23 Dec 2020 19:31)  HR: 69 (24 Dec 2020 14:00) (52 - 78)  BP: 138/86 (24 Dec 2020 14:00) (122/74 - 162/89)  BP(mean): 100 (24 Dec 2020 14:00) (91 - 100)  RR: 19 (24 Dec 2020 14:00) (15 - 19)  SpO2: 100% (24 Dec 2020 14:00) (96% - 100%)      Physical Exam:   General: No distress. Comfortable.  HEENT: EOM intact.  Neck: Neck supple. JVP not elevated. No masses  Chest: Clear to auscultation bilaterally  CV: s1 s2 RRR no murmurs, rubs or gallops   Abdomen: Soft, non-distended, non-tender  Extremity : + PP no c/c/e   R/L groin femoral site soft non tender no evidence of bleeding   Skin: No rashes or skin breakdown  Neurology: Alert and oriented times three. Sensation intact  Psych: Affect normal  c    70 year  man  PMH : GERD, HTN, HL Family history of CAD father . Pt with near syncope after starting Flomax. He took his BP at home and found his heart rate was irregular . He called his primary who instructed him to go to the ER . He was admitted 7/31-8/1 with palpitations. He was seen by cardiology and outpatient follow up was secured. On 8/4  patient seen by cardiology - . EKG done. Patient recommended to have Holter monitor , echo and NST. His NST done on 11/18/20 which revealed inferior wall ischemia   He was referred for cardiac cath s/p Cardiac cath on 12/23/20  revealed 2 vessel disease and need for rotoblade intervention  on 12/24. he was admitted to Hospital due to Comorbidities and need for staged procedure He was Loaded Plavix 600 mg po today then 75 mg po daily   He was taken to the cath lab today for planned intervention of LAD and RCA  done via RFA completed at 930 am . He tolerated procedure well for several hours and at  approximately 11:15 he reported MSCP 6/10 with associated nauseas  that was treated with Zofran , 3 SL Nitroglycerin with no relief. A stat echo and ekg where done with concern for distal clot or stent closure  with increasing chest pain.. He was taken urgently back into cath lab and found to have LAD closed   He was started on Cangrelor drip/ reloaded with Brilinta and received another SOBEIDA to LAD . His RCA stent was patent with no issue . He was transferred to MICU for further management and observation   Currently remains with some residual chest discomfort with no EKG changes  treated with IV Tylenol     a/p Acute stent closure   Patent RCA stent LAD  stent with dissection and abruptly closed reopened with SOBEIDA to LAD  done Via LFA   Admit to MICU ( d/w Dr Waddell)   Post orders and observations   groin precautions Bilateral angio-seal   May sit up at 1500    Bedrest  till am   Continue Cangrelor drip till 1430   Brilinta 90 mg po at 2200 tonight then   Brilinta 90 mg po BID starting 12/25  pt to  Continue Brilinta for One month   He will start on 1/18 Plavix and Brilinta for 5 days   then he will take Plavix 75 mg po daily starting on 1/23 daily with Asprin 81 mg po daily   ASA 81 mg po daily   Atorvastatin 80 mg po daily   Metoprolol 50 daily   amlodipine 5 mg po daily   ekg on arrival to floor  EKG prn pain and in AM   AM labs CMP/CBC   Reviewed case with Dr Tomlinson   likely discharge on 12/25   Early follow up with Dr Wilkinson 1 week            Pt c/o 6- 8 /10 chest pain radiating to Left arm  with nausea   StaT EKG with changes in Inferior leads and V leads   Zofran 4 mg IV given  Treated with NTG Sl x3 with no relief   Stat echo and  Dr Tomlinson at bedside    Echo abnormal   started briefly on heparin drip no bolus   Pt with progressive chest pain and nausea    Return to cath lab for exploratory of vessel concern for stent closure and dissection   Medications During cardiac cath   Versed0.5mg   Fentanyl 25mg   Heparin 6000 units   Nitroglycerin   Cangrelor bolus and drip started at 1230 in cath lab   Brilinta 180 mg po     MEDICATIONS  (STANDING):  acetaminophen  IVPB .. 1000 milliGRAM(s) IV Intermittent once  amLODIPine   Tablet 5 milliGRAM(s) Oral daily  aspirin  chewable 81 milliGRAM(s) Oral daily  atorvastatin 80 milliGRAM(s) Oral at bedtime  cangrelor Infusion 4 MICROgram(s)/kG/Min (95.3 mL/Hr) IV Continuous <Continuous>  chlorhexidine 4% Liquid 1 Application(s) Topical Once  metoprolol succinate ER 50 milliGRAM(s) Oral daily  ticagrelor 90 milliGRAM(s) Oral once    ICU Vital Signs Last 24 Hrs  T(C): 36.6 (23 Dec 2020 19:31), Max: 36.6 (23 Dec 2020 19:31)  T(F): 97.9 (23 Dec 2020 19:31), Max: 97.9 (23 Dec 2020 19:31)  HR: 69 (24 Dec 2020 14:00) (52 - 78)  BP: 138/86 (24 Dec 2020 14:00) (122/74 - 162/89)  BP(mean): 100 (24 Dec 2020 14:00) (91 - 100)  RR: 19 (24 Dec 2020 14:00) (15 - 19)  SpO2: 100% (24 Dec 2020 14:00) (96% - 100%)      Physical Exam:   General: No distress. Comfortable.  HEENT: EOM intact.  Neck: Neck supple. JVP not elevated. No masses  Chest: Clear to auscultation bilaterally  CV: s1 s2 RRR no murmurs, rubs or gallops   Abdomen: Soft, non-distended, non-tender  Extremity : + PP no c/c/e   R/L groin femoral site soft non tender no evidence of bleeding   Skin: No rashes or skin breakdown  Neurology: Alert and oriented times three. Sensation intact  Psych: Affect normal  c    70 year  man  PMH : GERD, HTN, HL Family history of CAD father . Pt with near syncope after starting Flomax. He took his BP at home and found his heart rate was irregular . He called his primary who instructed him to go to the ER . He was admitted 7/31-8/1 with palpitations. He was seen by cardiology and outpatient follow up was secured. On 8/4  patient seen by cardiology - . EKG done. Patient recommended to have Holter monitor , echo and NST. His NST done on 11/18/20 which revealed inferior wall ischemia   He was referred for cardiac cath s/p Cardiac cath on 12/23/20  revealed 2 vessel disease and need for rotoblade intervention  on 12/24. he was admitted to Hospital due to Comorbidities and need for staged procedure He was Loaded Plavix 600 mg po today then 75 mg po daily   He was taken to the cath lab today for planned intervention of LAD and RCA  done via RFA completed at 930 am . He tolerated procedure well for several hours and at  approximately 11:15 he reported MSCP 6/10 with associated nauseas  that was treated with Zofran , 3 SL Nitroglycerin with no relief. A stat echo and ekg where done with concern for distal clot or stent closure  with increasing chest pain.. He was taken urgently back into cath lab and found to have LAD closed   He was started on Cangrelor drip/ reloaded with Brilinta and received another SOBEIDA to LAD . His RCA stent was patent with no issue . He was transferred to MICU for further management and observation   Currently remains with some residual chest discomfort with no EKG changes  treated with IV Tylenol     a/p Acute stent closure   Patent RCA stent LAD  stent with dissection and abruptly closed reopened with SOBEIDA to LAD  done Via LFA   Admit to MICU ( d/w Dr Waddell)   Post orders and observations   groin precautions Bilateral angio-seal   May sit up at 1500    Bedrest  till am   Continue Cangrelor drip till 1430   Brilinta 90 mg po at 2200 tonight then   Brilinta 90 mg po BID starting 12/25  pt to  Continue Brilinta for One month   He will start on 1/18 [id number]  and Brilinta 90 mg BID and Asprin 81 mg po for 5 days   then he will take Plavix 75 mg po daily starting on 1/23 daily with Asprin 81 mg po daily      Atorvastatin 80 mg po daily   Metoprolol 50 daily   amlodipine 5 mg po daily   ekg on arrival to floor  EKG prn pain and in AM   AM labs CMP/CBC   Reviewed case with Dr Tomlinson   likely discharge on 12/25   Early follow up with Dr Wilkinson 1 week

## 2020-12-24 NOTE — CONSULT NOTE ADULT - ASSESSMENT
70 YOM  GERD, HTN, HL Family history of CAD father s/p cardiac cath with rotoblade atherectomy RCA and LAD, pci to LAD with re-occlusion and re-stenting now on cangrelor gtt

## 2020-12-24 NOTE — PROGRESS NOTE ADULT - SUBJECTIVE AND OBJECTIVE BOX
Interventional Cardiology NP Precath Note  Pt for cardiac cath and rotoblade intervention  No overnight events no chest pain or SOB       Mallampati 2  ASA 2  BRA 1.2  GFR     ALL: NKDA, NKFA. Denies shellfish/Contrast dye allergy.  PAST MEDICAL & SURGICAL HISTORY:  Hypercholesterolemia    GERD (gastroesophageal reflux disease)    BPH (Benign Prostatic Hyperplasia)    HTN (hypertension)    S/P inguinal hernia repair    S/P cholecystectomy        MEDS:   amLODIPine   Tablet 5 milliGRAM(s) Oral daily  aspirin  chewable 81 milliGRAM(s) Oral daily  atorvastatin 80 milliGRAM(s) Oral at bedtime  chlorhexidine 4% Liquid 1 Application(s) Topical Once  clopidogrel Tablet 75 milliGRAM(s) Oral daily  metoprolol succinate ER 50 milliGRAM(s) Oral daily  Vitals  T(C): 36.6 (12-23-20 @ 19:31), Max: 36.6 (12-23-20 @ 19:31)  HR: 78 (12-24-20 @ 03:50) (65 - 80)  BP: 142/71 (12-24-20 @ 03:50) (128/68 - 179/80)  RR: 16 (12-24-20 @ 03:50) (15 - 17)  SpO2: 96% (12-24-20 @ 03:50) (96% - 100%)  Exam   NEURO: A & O x 3, no focal neurologic deficits  HEENT: NCAT, EOMI, PERRLA  NECK: No JVD, No carotid bruits B/L, +2 Carotid pulses B/L  PULM:  CTA B/L No W/R/R  CARD: RRR, +S1, +S2, No M/R/G  ABD: ND, +BS, NT, no masses  EXT: Warm, pedal edema no, + pp   R radial cath site fingers warm and dry       Labs                        13.8   6.97  )-----------( 263      ( 24 Dec 2020 06:02 )             42.1     12-24    143  |  104  |  16.0  ----------------------------<  125<H>  3.8   |  25.0  |  0.87    Ca    9.0      24 Dec 2020 06:02    TPro  7.0  /  Alb  4.2  /  TBili  0.7  /  DBili  x   /  AST  17  /  ALT  16  /  AlkPhos  91  12-24

## 2020-12-25 ENCOUNTER — TRANSCRIPTION ENCOUNTER (OUTPATIENT)
Age: 70
End: 2020-12-25

## 2020-12-25 VITALS
OXYGEN SATURATION: 98 % | TEMPERATURE: 98 F | DIASTOLIC BLOOD PRESSURE: 76 MMHG | RESPIRATION RATE: 22 BRPM | SYSTOLIC BLOOD PRESSURE: 124 MMHG | HEART RATE: 83 BPM

## 2020-12-25 LAB
ALBUMIN SERPL ELPH-MCNC: 3.6 G/DL — SIGNIFICANT CHANGE UP (ref 3.3–5.2)
ALP SERPL-CCNC: 107 U/L — SIGNIFICANT CHANGE UP (ref 40–120)
ALT FLD-CCNC: 169 U/L — HIGH
ANION GAP SERPL CALC-SCNC: 12 MMOL/L — SIGNIFICANT CHANGE UP (ref 5–17)
AST SERPL-CCNC: 182 U/L — HIGH
BASOPHILS # BLD AUTO: 0.03 K/UL — SIGNIFICANT CHANGE UP (ref 0–0.2)
BASOPHILS NFR BLD AUTO: 0.3 % — SIGNIFICANT CHANGE UP (ref 0–2)
BILIRUB SERPL-MCNC: 1.3 MG/DL — SIGNIFICANT CHANGE UP (ref 0.4–2)
BUN SERPL-MCNC: 13 MG/DL — SIGNIFICANT CHANGE UP (ref 8–20)
CALCIUM SERPL-MCNC: 8.6 MG/DL — SIGNIFICANT CHANGE UP (ref 8.6–10.2)
CHLORIDE SERPL-SCNC: 105 MMOL/L — SIGNIFICANT CHANGE UP (ref 98–107)
CO2 SERPL-SCNC: 24 MMOL/L — SIGNIFICANT CHANGE UP (ref 22–29)
CREAT SERPL-MCNC: 0.86 MG/DL — SIGNIFICANT CHANGE UP (ref 0.5–1.3)
EOSINOPHIL # BLD AUTO: 0.1 K/UL — SIGNIFICANT CHANGE UP (ref 0–0.5)
EOSINOPHIL NFR BLD AUTO: 0.9 % — SIGNIFICANT CHANGE UP (ref 0–6)
GLUCOSE SERPL-MCNC: 125 MG/DL — HIGH (ref 70–99)
HCT VFR BLD CALC: 38.9 % — LOW (ref 39–50)
HGB BLD-MCNC: 12.9 G/DL — LOW (ref 13–17)
IMM GRANULOCYTES NFR BLD AUTO: 0.4 % — SIGNIFICANT CHANGE UP (ref 0–1.5)
LYMPHOCYTES # BLD AUTO: 1.11 K/UL — SIGNIFICANT CHANGE UP (ref 1–3.3)
LYMPHOCYTES # BLD AUTO: 10.3 % — LOW (ref 13–44)
MAGNESIUM SERPL-MCNC: 2.1 MG/DL — SIGNIFICANT CHANGE UP (ref 1.6–2.6)
MCHC RBC-ENTMCNC: 29.9 PG — SIGNIFICANT CHANGE UP (ref 27–34)
MCHC RBC-ENTMCNC: 33.2 GM/DL — SIGNIFICANT CHANGE UP (ref 32–36)
MCV RBC AUTO: 90 FL — SIGNIFICANT CHANGE UP (ref 80–100)
MONOCYTES # BLD AUTO: 1.15 K/UL — HIGH (ref 0–0.9)
MONOCYTES NFR BLD AUTO: 10.7 % — SIGNIFICANT CHANGE UP (ref 2–14)
NEUTROPHILS # BLD AUTO: 8.34 K/UL — HIGH (ref 1.8–7.4)
NEUTROPHILS NFR BLD AUTO: 77.4 % — HIGH (ref 43–77)
PHOSPHATE SERPL-MCNC: 2.9 MG/DL — SIGNIFICANT CHANGE UP (ref 2.4–4.7)
PLATELET # BLD AUTO: 253 K/UL — SIGNIFICANT CHANGE UP (ref 150–400)
POTASSIUM SERPL-MCNC: 3.9 MMOL/L — SIGNIFICANT CHANGE UP (ref 3.5–5.3)
POTASSIUM SERPL-SCNC: 3.9 MMOL/L — SIGNIFICANT CHANGE UP (ref 3.5–5.3)
PROT SERPL-MCNC: 6.1 G/DL — LOW (ref 6.6–8.7)
RBC # BLD: 4.32 M/UL — SIGNIFICANT CHANGE UP (ref 4.2–5.8)
RBC # FLD: 12.7 % — SIGNIFICANT CHANGE UP (ref 10.3–14.5)
SODIUM SERPL-SCNC: 141 MMOL/L — SIGNIFICANT CHANGE UP (ref 135–145)
WBC # BLD: 10.77 K/UL — HIGH (ref 3.8–10.5)
WBC # FLD AUTO: 10.77 K/UL — HIGH (ref 3.8–10.5)

## 2020-12-25 PROCEDURE — 99152 MOD SED SAME PHYS/QHP 5/>YRS: CPT

## 2020-12-25 PROCEDURE — 85730 THROMBOPLASTIN TIME PARTIAL: CPT

## 2020-12-25 PROCEDURE — C1769: CPT

## 2020-12-25 PROCEDURE — 85025 COMPLETE CBC W/AUTO DIFF WBC: CPT

## 2020-12-25 PROCEDURE — 99238 HOSP IP/OBS DSCHRG MGMT 30/<: CPT

## 2020-12-25 PROCEDURE — 99153 MOD SED SAME PHYS/QHP EA: CPT

## 2020-12-25 PROCEDURE — 36415 COLL VENOUS BLD VENIPUNCTURE: CPT

## 2020-12-25 PROCEDURE — C1874: CPT

## 2020-12-25 PROCEDURE — 93010 ELECTROCARDIOGRAM REPORT: CPT

## 2020-12-25 PROCEDURE — 92978 ENDOLUMINL IVUS OCT C 1ST: CPT | Mod: 78,LD

## 2020-12-25 PROCEDURE — C1889: CPT

## 2020-12-25 PROCEDURE — 86901 BLOOD TYPING SEROLOGIC RH(D): CPT

## 2020-12-25 PROCEDURE — 93454 CORONARY ARTERY ANGIO S&I: CPT | Mod: 78,XU

## 2020-12-25 PROCEDURE — C1725: CPT

## 2020-12-25 PROCEDURE — C1724: CPT

## 2020-12-25 PROCEDURE — C1760: CPT

## 2020-12-25 PROCEDURE — C9602: CPT | Mod: LD

## 2020-12-25 PROCEDURE — 80061 LIPID PANEL: CPT

## 2020-12-25 PROCEDURE — C1753: CPT

## 2020-12-25 PROCEDURE — 80053 COMPREHEN METABOLIC PANEL: CPT

## 2020-12-25 PROCEDURE — 93308 TTE F-UP OR LMTD: CPT

## 2020-12-25 PROCEDURE — 85576 BLOOD PLATELET AGGREGATION: CPT

## 2020-12-25 PROCEDURE — C9460: CPT

## 2020-12-25 PROCEDURE — 83036 HEMOGLOBIN GLYCOSYLATED A1C: CPT

## 2020-12-25 PROCEDURE — 93005 ELECTROCARDIOGRAM TRACING: CPT

## 2020-12-25 PROCEDURE — C9600: CPT | Mod: 78,LD

## 2020-12-25 PROCEDURE — 93458 L HRT ARTERY/VENTRICLE ANGIO: CPT

## 2020-12-25 PROCEDURE — 85610 PROTHROMBIN TIME: CPT

## 2020-12-25 PROCEDURE — 83735 ASSAY OF MAGNESIUM: CPT

## 2020-12-25 PROCEDURE — 86850 RBC ANTIBODY SCREEN: CPT

## 2020-12-25 PROCEDURE — C1757: CPT

## 2020-12-25 PROCEDURE — C1887: CPT

## 2020-12-25 PROCEDURE — 80048 BASIC METABOLIC PNL TOTAL CA: CPT

## 2020-12-25 PROCEDURE — 86900 BLOOD TYPING SEROLOGIC ABO: CPT

## 2020-12-25 PROCEDURE — C1894: CPT

## 2020-12-25 PROCEDURE — 84100 ASSAY OF PHOSPHORUS: CPT

## 2020-12-25 RX ORDER — ASPIRIN/CALCIUM CARB/MAGNESIUM 324 MG
1 TABLET ORAL
Qty: 90 | Refills: 3
Start: 2020-12-25 | End: 2021-12-19

## 2020-12-25 RX ORDER — TICAGRELOR 90 MG/1
1 TABLET ORAL
Qty: 0 | Refills: 0 | DISCHARGE
Start: 2020-12-25

## 2020-12-25 RX ORDER — ENOXAPARIN SODIUM 100 MG/ML
40 INJECTION SUBCUTANEOUS DAILY
Refills: 0 | Status: DISCONTINUED | OUTPATIENT
Start: 2020-12-25 | End: 2020-12-25

## 2020-12-25 RX ORDER — POTASSIUM CHLORIDE 20 MEQ
40 PACKET (EA) ORAL ONCE
Refills: 0 | Status: COMPLETED | OUTPATIENT
Start: 2020-12-25 | End: 2020-12-25

## 2020-12-25 RX ORDER — TICAGRELOR 90 MG/1
1 TABLET ORAL
Qty: 60 | Refills: 0
Start: 2020-12-25 | End: 2021-01-23

## 2020-12-25 RX ORDER — CLOPIDOGREL BISULFATE 75 MG/1
1 TABLET, FILM COATED ORAL
Qty: 90 | Refills: 4
Start: 2020-12-25 | End: 2022-03-19

## 2020-12-25 RX ADMIN — Medication 50 MILLIGRAM(S): at 05:14

## 2020-12-25 RX ADMIN — AMLODIPINE BESYLATE 5 MILLIGRAM(S): 2.5 TABLET ORAL at 05:14

## 2020-12-25 RX ADMIN — Medication 40 MILLIEQUIVALENT(S): at 08:40

## 2020-12-25 RX ADMIN — TICAGRELOR 90 MILLIGRAM(S): 90 TABLET ORAL at 05:17

## 2020-12-25 RX ADMIN — Medication 81 MILLIGRAM(S): at 11:30

## 2020-12-25 NOTE — PROGRESS NOTE ADULT - SUBJECTIVE AND OBJECTIVE BOX
On Admission  12-23-20 (2d)  HPI:  70 year  man  PMH : GERD, HTN, HL Family history of CAD father . Pt with near syncope after starting Flomax. He took his BP at home and found his heart rate was irregular . He called his primary who instructed him to go to the ER . He was admitted 7/31-8/1 with palpitations. He was seen by cardiology and outpatient follow up was secured. On 8/4  patient seen by cardiology - . EKG done. Patient recommended to have Holter monitor , echo and NST. His NST done on 11/18/20 which revealed inferior wall ischemia   He was referred for cardiac cath      Nuclear Stress Test-Pharmacologic (11.18.20   IMPRESSIONS: Abnormal Study  * Chest Pain: No chest pain with administration of  Regadenoson.  * Symptom: Shortness of breath.  * HR Response: Appropriate.  * BP Response: Appropriate.  * Heart Rhythm: Normal Sinus Rhythm - 71 BPM.  * Baseline ECG: RBBB.  * ECG Abnormalities: There were no diagnostic changes.  * Arrhythmia: None.  * Small to Medium size fixed defect involving basal and  mid inferior wall with mild goldy infarct ischemia.  *Post-stress resting myocardial perfusion gated SPECT  imaging was performed (LVEF > 70%;LVEDV = 61 ml.)    Conclusion:  Small to Medium size fixed defect involving basal and mid  inferior wall with mild goldy infarct ischemia.  ------------------------------------------------------------------------          11/12/2020: EF 55-60%. Trace      ASA:2  Mallampati 2  GFR:  BRA:    Antianginals:  Norvasc, toprol xl (22 Dec 2020 15:00)    PAST MEDICAL & SURGICAL HISTORY:  Hypercholesterolemia    GERD (gastroesophageal reflux disease)    BPH (Benign Prostatic Hyperplasia)    HTN (hypertension)    S/P inguinal hernia repair    S/P cholecystectomy        Antimicrobial:    Cardiovascular:  amLODIPine   Tablet 5 milliGRAM(s) Oral daily  metoprolol succinate ER 50 milliGRAM(s) Oral daily    Pulmonary:    Hematalogic:  aspirin  chewable 81 milliGRAM(s) Oral daily  enoxaparin Injectable 40 milliGRAM(s) SubCutaneous daily  ticagrelor 90 milliGRAM(s) Oral two times a day    Other:  aluminum hydroxide/magnesium hydroxide/simethicone Suspension 30 milliLiter(s) Oral every 6 hours PRN  atorvastatin 80 milliGRAM(s) Oral at bedtime      Drug Dosing Weight  Height (cm): 177.8 (23 Dec 2020 08:45)  Weight (kg): 79.379 (23 Dec 2020 08:45)  BMI (kg/m2): 25.1 (23 Dec 2020 08:45)  BSA (m2): 1.97 (23 Dec 2020 08:45)    T(C): 36.7 (12-25-20 @ 08:01), Max: 37.1 (12-24-20 @ 19:30)  HR: 96 (12-25-20 @ 09:00)  BP: 99/65 (12-25-20 @ 09:00)  BP(mean): 76 (12-25-20 @ 09:00)  ABP: --  ABP(mean): --  RR: 31 (12-25-20 @ 09:00)  SpO2: 99% (12-25-20 @ 09:00)          12-24 @ 07:01  -  12-25 @ 07:00  --------------------------------------------------------  IN: 186 mL / OUT: 955 mL / NET: -769 mL              LABS:  CBC Full  -  ( 25 Dec 2020 04:52 )  WBC Count : 10.77 K/uL  RBC Count : 4.32 M/uL  Hemoglobin : 12.9 g/dL  Hematocrit : 38.9 %  Platelet Count - Automated : 253 K/uL  Mean Cell Volume : 90.0 fl  Mean Cell Hemoglobin : 29.9 pg  Mean Cell Hemoglobin Concentration : 33.2 gm/dL  Auto Neutrophil # : 8.34 K/uL  Auto Lymphocyte # : 1.11 K/uL  Auto Monocyte # : 1.15 K/uL  Auto Eosinophil # : 0.10 K/uL  Auto Basophil # : 0.03 K/uL  Auto Neutrophil % : 77.4 %  Auto Lymphocyte % : 10.3 %  Auto Monocyte % : 10.7 %  Auto Eosinophil % : 0.9 %  Auto Basophil % : 0.3 %    12-25    141  |  105  |  13.0  ----------------------------<  125<H>  3.9   |  24.0  |  0.86    Ca    8.6      25 Dec 2020 04:52  Phos  2.9     12-25  Mg     2.1     12-25    TPro  6.1<L>  /  Alb  3.6  /  TBili  1.3  /  DBili  x   /  AST  182<H>  /  ALT  169<H>  /  AlkPhos  107  12-25          ____________________________________________________________________________________________________

## 2020-12-25 NOTE — DISCHARGE NOTE NURSING/CASE MANAGEMENT/SOCIAL WORK - [facility]_GEN_ALL_CORE_FT
Please call and make an appointment with Dr. Quiroga in 1-2 weeks. Also please follow with Dr. Tomlinson, call office to make an appointment.

## 2020-12-25 NOTE — PROGRESS NOTE ADULT - SUBJECTIVE AND OBJECTIVE BOX
Department of Cardiology                                                  [facility]/[place] [address] [address] [facility]                                            Telephone: [phone]. Fax:[phone]                                         Post Procedure Follow Up Cardiac Cath NP Note             Narrative:    70 year  man  PMH : GERD, HTN, HL Family history of CAD father . Pt with near syncope after starting Flomax. He took his BP at home and found his heart rate was irregular . He called his primary who instructed him to go to the ER . He was admitted 7/31-8/1 with palpitations. He was seen by cardiology and outpatient follow up was secured. On 8/4  patient seen by cardiology - . EKG done. Patient recommended to have Holter monitor , echo and NST. His NST done on 11/18/20 which revealed inferior wall ischemia. He was referred for cardiac cath. Patient underwent diagnostic angiogram and noted to have severe right coronary artery disease and left anterior descending artery disease now with stents to both arteries. Patient developed chest pain post cardiac cath 12/24/20 was taken back to lab urgently for acute stent thrombosis of left anterior descending artery. Post procedure echocardiogram showed global left ventricular systolic function was low normal, EF 50-55%, no evidence of pericardial effusion.  Patient stable from cardiac standpoint for discharge with outpatient cardiology follow up.     12/23/20 Diagnostic Cath:  s/p Cardiac cath  with 2 vessel disease significant calcification to RCA and LAD   12/24/20 s/p rotoblade intervention of LAD and intervention of RCA SOBEIDA   12/24/20 same day urgent cath for Chest Pain:  Post acute closure of LAD requiring urgent re- cath and another stent to LAD     Discharge Recommendations:  patient to be on  ASA 81 mg po daily and Brilinta 90 mg  BID for one month - until 1/17/21  then starting 1/18/21 patient will take Asprin 81 mg po daily and Brilinta 90 BID and Plavix 75 daily for 5 days - until 1/22/21  then starting 1/23/21 patient will be on Plavix 75 mg po daily and Aspirin 81 mg po daily     Outpatient follow up with Dr. Bonilla             PAST MEDICAL & SURGICAL HISTORY:  Hypercholesterolemia  GERD (gastroesophageal reflux disease)  BPH (Benign Prostatic Hyperplasia)  HTN (hypertension)  S/P inguinal hernia repair  S/P cholecystectomy      FAMILY HISTORY:  No pertinent family history in first degree relatives      Home Medications:  metoprolol succinate 50 mg oral tablet, extended release: 1 tab(s) orally once a day (23 Dec 2020 08:30)                 12.9   10.77 )-----------( 253      ( 25 Dec 2020 04:52 )             38.9     12-25    141  |  105  |  13.0  ----------------------------<  125<H>  3.9   |  24.0  |  0.86    Ca    8.6      25 Dec 2020 04:52  Phos  2.9     12-25  Mg     2.1     12-25    TPro  6.1<L>  /  Alb  3.6  /  TBili  1.3  /  DBili  x   /  AST  182<H>  /  ALT  169<H>  /  AlkPhos  107  12-25       General: No fatigue, no fevers/chills  Respiratory: No dyspnea, no cough, no wheeze  CV: No chest pain, no palpitations  Abd: No nausea  Neuro: No headache, no dizziness  azithromycin (Other)  crabs (Hives)  Flomax (Faint)  penicillin (Hives)      Objective:  Vital Signs Last 24 Hrs  T(C): 36.7 (25 Dec 2020 08:01), Max: 37.1 (24 Dec 2020 19:30)  T(F): 98.1 (25 Dec 2020 08:01), Max: 98.8 (24 Dec 2020 19:30)  HR: 96 (25 Dec 2020 09:00) (62 - 96)  BP: 99/65 (25 Dec 2020 09:00) (99/65 - 170/65)  BP(mean): 76 (25 Dec 2020 09:00) (76 - 143)  RR: 31 (25 Dec 2020 09:00) (8 - 31)  SpO2: 99% (25 Dec 2020 09:00) (91% - 100%)    CM: SR  Neuro: A&OX3, CN 2-12 intact  HEENT: NC, AT  Lungs: CTA B/L  CV: S1, S2, no murmur, RRR  Abd: Soft  Right Groin: Soft, no bleeding, no hematoma  Extremity: + distal pulses  EKG:   NSR, no events on telemetry or acute ischemic changes            DIAGNOSTICS:    Nuclear Stress Test-Pharmacologic (11.18.20   IMPRESSIONS: Abnormal Study  * Chest Pain: No chest pain with administration of  Regadenoson.  * Symptom: Shortness of breath.  * HR Response: Appropriate.  * BP Response: Appropriate.  * Heart Rhythm: Normal Sinus Rhythm - 71 BPM.  * Baseline ECG: RBBB.  * ECG Abnormalities: There were no diagnostic changes.  * Arrhythmia: None.  * Small to Medium size fixed defect involving basal and  mid inferior wall with mild goldy infarct ischemia.  *Post-stress resting myocardial perfusion gated SPECT  imaging was performed (LVEF > 70%;LVEDV = 61 ml.)  11/12/2020: EF 55-60%. Trace MR.     Conclusion:  Small to Medium size fixed defect involving basal and mid  inferior wall with mild goldy infarct ischemia.    < from: TTE Echo Limited or F/U (12.24.20 @ 11:31) >  Summary:   1. Limited STAT echo bedside in cath lab to r/o pericardial effusion.   2. Left ventricular ejection fraction, by visual estimation, is 50 to 55%.   3. Mid and apical inferior septum, apical lateral segment, and apex are abnormal as described above.   4. Low normal global left ventricular systolic function.   5. Normal left ventricular internal cavity size.   6. Normal right ventricular size and function.   7. There is no evidence of pericardial effusion.    MD Coleman Electronically signedon 12/24/2020 at 2:46:06 PM  *** Final ***    MARCELA BANERJEE MD; Attending Cardiologist  This document has been electronically signed. Dec 24 2020 11:31AM    < end of copied text >    < from: Cardiac Cath Lab - Adult (12.24.20 @ 12:06) >  PROCEDURE:  --  Right coronary angiography.  --  Left coronary angiography.  --  Sonosite.  --  Interventional IVUS.  --  Hemostasis with Angioseal-Intervention.  --  Intervention on proximal LAD: drug-eluting stent.  TECHNIQUE: Cardiac catheterization performed urgently. Coronary  intervention performed urgently.  Local anesthetic given. Left femoral artery access. Right coronary artery  angiography. The vessel was injected utilizing a catheter. Left coronary  artery angiography. The vessel was injected utilizing a catheter.  Sonosite. RADIATION EXPOSURE: 7.3 min. A drug-eluting stent was performed  on the 100 % lesion in the proximal LAD. Following intervention there was  a 2 % residual stenosis. There was no dissection. Balloon angioplasty was  performed, using a EMERGE 2.5MM X 15MM balloon, with 3 inflations and a  maximum inflation pressure of 14 dorina. During the procedure, the previous  guider was changed for a 6F EBU3.5 LAUNCHER guider, and a new 190CM  FIELDER XT wire was advanced across the lesion. A DEBRA 3.00 X 18MM  drug-eluting stent was placed across the lesion and deployed at a maximum  inflation pressure of 12 dorina. Balloon angioplasty was performed, using a  NC EMERGE 3.00 X 12MM balloon, with 2 inflations and a maximum inflation  pressure of 16 dorina. Interventional IVUS. Hemostasis with  Angioseal-Intervention.  CONTRAST GIVEN: Omnipaque 20 ml. Omnipaque 48 ml.  MEDICATIONS GIVEN: Midazolam, 0.5 mg, IV. Fentanyl, 25 mcg, IV.  Nitroglycerin, 200 mcg, intracoronary. Heparin, 6000 units, IV. 1%  Lidocaine, 10 ml, subcutaneously. Cangrelor, infusion rate of 4  mcg/kg/min, IV. ticagrelor, 180 mg, PO. 0.9NS, 500 ml, IV.  CORONARY VESSELS: The coronary circulation is right dominant.  LM:   --  LM: Normal.  LAD:   --  Proximal LAD: There was a 100 % stenosis. The lesion was  associated with a moderate filling defect consistent with thrombus.  CX:   --  Circumflex: Normal.  RCA:   --  Proximal RCA: There was a 0 % stenosis in-stent.  COMPLICATIONS: No complications occurred during the cath lab visit.  DIAGNOSTIC IMPRESSIONS: Acute stent thrombosis of LAD. Unclear etiology. ?  of distal edge dissection but none seen on prior angio/IVUS imaging.  PCI performed with 1 SOBEIDA overlapping previous LAD stent.  DIAGNOSTIC RECOMMENDATIONS: Aspirin and Brilinta.  Patient will be provided with one month supply. After this, will change to  plavix due to monitary reasons. For changing, needs to overlap plavix with  brilinta for 5 days.  INTERVENTIONAL IMPRESSIONS: Acute stent thrombosis of LAD. Unclear  etiology. ? of distal edge dissection but none seen on prior angio/IVUS  imaging.  PCI performed with 1 SOBEIDA overlapping previous LAD stent.  INTERVENTIONAL RECOMMENDATIONS: Aspirin and Brilinta.  Patient will be provided with one month supply. After this, will change to  plavix due to monitary reasons. For changing, needs to overlap plavix with  brilinta for 5 days.  Prepared and signed by  Angélica CORTEZ  Signed 12/24/2020 16:12:42    < end of copied text >        ASSESSMENT AND PLAN:          -post cardiac cath orders  -radial or groin precautions  -continue current medical therapy  -DAPT (ASA and plavix)  -statin  -BB  -follow up outpt in 2 weeks                                                         Department of Cardiology                                                  [facility]/[place] [address] [address]                                            Telephone: [phone]. Fax:[phone]                                         Post Procedure Follow Up Cardiac Cath NP Note       Narrative:    70 year  man  PMH : GERD, HTN, HL Family history of CAD father . Pt with near syncope after starting Flomax. He took his BP at home and found his heart rate was irregular . He called his primary who instructed him to go to the ER . He was admitted 7/31-8/1 with palpitations. He was seen by cardiology and outpatient follow up was secured. On 8/4  patient seen by cardiology - . EKG done. Patient recommended to have Holter monitor , echo and NST. His NST done on 11/18/20 which revealed inferior wall ischemia. He was referred for cardiac cath. Patient underwent diagnostic angiogram and noted to have severe right coronary artery disease and left anterior descending artery disease now with stents to both arteries. Patient developed chest pain post cardiac cath 12/24/20 was taken back to lab urgently for acute stent thrombosis of left anterior descending artery. Post procedure echocardiogram showed global left ventricular systolic function was low normal, EF 50-55%, no evidence of pericardial effusion.  Patient stable from cardiac standpoint for discharge with outpatient cardiology follow up.     12/23/20 Diagnostic Cath:  s/p Cardiac cath  with 2 vessel disease significant calcification to RCA and LAD   12/24/20 s/p rotoblade intervention of LAD and intervention of RCA SOBEIDA   12/24/20 same day urgent cath for Chest Pain:  Post acute closure of LAD requiring urgent re- cath and another stent to LAD     Discharge Recommendations:  patient to be on  ASA 81 mg po daily and Brilinta 90 mg  BID for one month - until 1/17/21  then starting 1/18/21 patient will take Asprin 81 mg po daily and Brilinta 90 BID and Plavix 75 daily for 5 days - until 1/22/21  then starting 1/23/21 patient will be on Plavix 75 mg po daily and Aspirin 81 mg po daily     Outpatient follow up with Dr. Bonilla             PAST MEDICAL & SURGICAL HISTORY:  Hypercholesterolemia  GERD (gastroesophageal reflux disease)  BPH (Benign Prostatic Hyperplasia)  HTN (hypertension)  S/P inguinal hernia repair  S/P cholecystectomy      FAMILY HISTORY:  No pertinent family history in first degree relatives      Home Medications:  metoprolol succinate 50 mg oral tablet, extended release: 1 tab(s) orally once a day (23 Dec 2020 08:30)                 12.9   10.77 )-----------( 253      ( 25 Dec 2020 04:52 )             38.9     12-25    141  |  105  |  13.0  ----------------------------<  125<H>  3.9   |  24.0  |  0.86    Ca    8.6      25 Dec 2020 04:52  Phos  2.9     12-25  Mg     2.1     12-25    TPro  6.1<L>  /  Alb  3.6  /  TBili  1.3  /  DBili  x   /  AST  182<H>  /  ALT  169<H>  /  AlkPhos  107  12-25       General: No fatigue, no fevers/chills  Respiratory: No dyspnea, no cough, no wheeze  CV: No chest pain, no palpitations  Abd: No nausea  Neuro: No headache, no dizziness  azithromycin (Other)  crabs (Hives)  Flomax (Faint)  penicillin (Hives)      Objective:  Vital Signs Last 24 Hrs  T(C): 36.7 (25 Dec 2020 08:01), Max: 37.1 (24 Dec 2020 19:30)  T(F): 98.1 (25 Dec 2020 08:01), Max: 98.8 (24 Dec 2020 19:30)  HR: 96 (25 Dec 2020 09:00) (62 - 96)  BP: 99/65 (25 Dec 2020 09:00) (99/65 - 170/65)  BP(mean): 76 (25 Dec 2020 09:00) (76 - 143)  RR: 31 (25 Dec 2020 09:00) (8 - 31)  SpO2: 99% (25 Dec 2020 09:00) (91% - 100%)    CM: SR  Neuro: A&OX3, CN 2-12 intact  HEENT: NC, AT  Lungs: CTA B/L  CV: S1, S2, no murmur, RRR  Abd: Soft  Right Groin: Soft, no bleeding, no hematoma  Extremity: + distal pulses  EKG:   NSR, no events on telemetry or acute ischemic changes            DIAGNOSTICS:    Nuclear Stress Test-Pharmacologic (11.18.20   IMPRESSIONS: Abnormal Study  * Chest Pain: No chest pain with administration of  Regadenoson.  * Symptom: Shortness of breath.  * HR Response: Appropriate.  * BP Response: Appropriate.  * Heart Rhythm: Normal Sinus Rhythm - 71 BPM.  * Baseline ECG: RBBB.  * ECG Abnormalities: There were no diagnostic changes.  * Arrhythmia: None.  * Small to Medium size fixed defect involving basal and  mid inferior wall with mild goldy infarct ischemia.  *Post-stress resting myocardial perfusion gated SPECT  imaging was performed (LVEF > 70%;LVEDV = 61 ml.)  11/12/2020: EF 55-60%. Trace MR.     Conclusion:  Small to Medium size fixed defect involving basal and mid  inferior wall with mild goldy infarct ischemia.    < from: TTE Echo Limited or F/U (12.24.20 @ 11:31) >  Summary:   1. Limited STAT echo bedside in cath lab to r/o pericardial effusion.   2. Left ventricular ejection fraction, by visual estimation, is 50 to 55%.   3. Mid and apical inferior septum, apical lateral segment, and apex are abnormal as described above.   4. Low normal global left ventricular systolic function.   5. Normal left ventricular internal cavity size.   6. Normal right ventricular size and function.   7. There is no evidence of pericardial effusion.    MD Coleman Electronically signedon 12/24/2020 at 2:46:06 PM  *** Final ***    MARCELA BANERJEE MD; Attending Cardiologist  This document has been electronically signed. Dec 24 2020 11:31AM    < end of copied text >    < from: Cardiac Cath Lab - Adult (12.24.20 @ 12:06) >  PROCEDURE:  --  Right coronary angiography.  --  Left coronary angiography.  --  Sonosite.  --  Interventional IVUS.  --  Hemostasis with Angioseal-Intervention.  --  Intervention on proximal LAD: drug-eluting stent.  TECHNIQUE: Cardiac catheterization performed urgently. Coronary  intervention performed urgently.  Local anesthetic given. Left femoral artery access. Right coronary artery  angiography. The vessel was injected utilizing a catheter. Left coronary  artery angiography. The vessel was injected utilizing a catheter.  Sonosite. RADIATION EXPOSURE: 7.3 min. A drug-eluting stent was performed  on the 100 % lesion in the proximal LAD. Following intervention there was  a 2 % residual stenosis. There was no dissection. Balloon angioplasty was  performed, using a EMERGE 2.5MM X 15MM balloon, with 3 inflations and a  maximum inflation pressure of 14 dorina. During the procedure, the previous  guider was changed for a 6F EBU3.5 LAUNCHER guider, and a new 190CM  FIELDER XT wire was advanced across the lesion. A DEBRA 3.00 X 18MM  drug-eluting stent was placed across the lesion and deployed at a maximum  inflation pressure of 12 dorina. Balloon angioplasty was performed, using a  NC EMERGE 3.00 X 12MM balloon, with 2 inflations and a maximum inflation  pressure of 16 dorina. Interventional IVUS. Hemostasis with  Angioseal-Intervention.  CONTRAST GIVEN: Omnipaque 20 ml. Omnipaque 48 ml.  MEDICATIONS GIVEN: Midazolam, 0.5 mg, IV. Fentanyl, 25 mcg, IV.  Nitroglycerin, 200 mcg, intracoronary. Heparin, 6000 units, IV. 1%  Lidocaine, 10 ml, subcutaneously. Cangrelor, infusion rate of 4  mcg/kg/min, IV. ticagrelor, 180 mg, PO. 0.9NS, 500 ml, IV.  CORONARY VESSELS: The coronary circulation is right dominant.  LM:   --  LM: Normal.  LAD:   --  Proximal LAD: There was a 100 % stenosis. The lesion was  associated with a moderate filling defect consistent with thrombus.  CX:   --  Circumflex: Normal.  RCA:   --  Proximal RCA: There was a 0 % stenosis in-stent.  COMPLICATIONS: No complications occurred during the cath lab visit.  DIAGNOSTIC IMPRESSIONS: Acute stent thrombosis of LAD. Unclear etiology. ?  of distal edge dissection but none seen on prior angio/IVUS imaging.  PCI performed with 1 SOBEIDA overlapping previous LAD stent.  DIAGNOSTIC RECOMMENDATIONS: Aspirin and Brilinta.  Patient will be provided with one month supply. After this, will change to  plavix due to monitary reasons. For changing, needs to overlap plavix with  brilinta for 5 days.  INTERVENTIONAL IMPRESSIONS: Acute stent thrombosis of LAD. Unclear  etiology. ? of distal edge dissection but none seen on prior angio/IVUS  imaging.  PCI performed with 1 SOBEIDA overlapping previous LAD stent.  INTERVENTIONAL RECOMMENDATIONS: Aspirin and Brilinta.  Patient will be provided with one month supply. After this, will change to  plavix due to monitary reasons. For changing, needs to overlap plavix with  brilinta for 5 days.  Prepared and signed by  Angélica CORTEZ  Signed 12/24/2020 16:12:42    < end of copied text >        ASSESSMENT AND PLAN:    70 year  man  PMH : GERD, HTN, HL Family history of CAD father . Pt with near syncope after starting Flomax. He took his BP at home and found his heart rate was irregular . He called his primary who instructed him to go to the ER . He was admitted 7/31-8/1 with palpitations. He was seen by cardiology and outpatient follow up was secured. On 8/4  patient seen by cardiology - . EKG done. Patient recommended to have Holter monitor , echo and NST. His NST done on 11/18/20 which revealed inferior wall ischemia. He was referred for cardiac cath. Patient underwent diagnostic angiogram and noted to have severe right coronary artery disease and left anterior descending artery disease now with stents to both arteries. Patient developed chest pain post cardiac cath 12/24/20 was taken back to lab urgently for acute stent thrombosis of left anterior descending artery. Post procedure echocardiogram showed global left ventricular systolic function was low normal, EF 50-55%, no evidence of pericardial effusion.  Patient stable from cardiac standpoint for discharge with outpatient cardiology follow up.     -pt without c/o chest pain, sob  -bl groin stable; no evidence of bleeding or hematoma  -s/p cardiac cath as below:   12/23/20 Diagnostic Cath:  s/p Cardiac cath  with 2 vessel disease significant calcification to RCA and LAD   12/24/20 s/p rotoblade intervention of LAD and intervention of RCA SOBEIDA   12/24/20 same day urgent cath for Chest Pain:  Post acute closure of LAD requiring urgent re- cath and another stent to LAD   -Discharge Recommendations: pt verbalized understanding of medication recommendations   patient to be on  ASA 81 mg po daily and Brilinta 90 mg  BID for one month - until 1/17/21  then starting 1/18/21 patient will take Asprin 81 mg po daily and Brilinta 90 BID and Plavix 75 daily for 5 days - until 1/22/21  then starting 1/23/21 patient will be on Plavix 75 mg po daily and Aspirin 81 mg po daily   -pt to finish triple antiplatelet therapy until 1/12/21, then continue with dual antiplatelet therapy starting 1/23/21   -Outpatient follow up with Dr. Bonilla   -cardiac rehab info provided/referral and communication to cardiac rehab completed  -above discussed with Dr. Tomlinson and MICU team

## 2020-12-25 NOTE — PROGRESS NOTE ADULT - ASSESSMENT
PRE-PROCEDURE ASSESSMENT  [facility] -Patient seen and examined  -Labs and EKG reviewed   -Pre-procedure teaching completed with patient and family  - Informed consent obtained   -Questions answered  - Pt received Asprin/Plavix  prior to cardiac cath   Risks & benefits of procedure and sedation and risks and benefits for the alternative therapy have been explained to the patient in layman’s terms including but not limited to: allergic reaction, bleeding, infection, arrhythmia, respiratory compromise, renal and vascular compromise, limb damage, MI, CVA, emergent CABG/Vascular Surgery and death. Informed consent obtained and in chart.
70 year  man  PMH : GERD, HTN, HL Family history of CAD father . Pt with near syncope after starting Flomax. He took his BP at home and found his heart rate was irregular . He called his primary who instructed him to go to the ER . He was admitted 7/31-8/1 with palpitations. He was seen by cardiology and outpatient follow up was secured. On 8/4  patient seen by cardiology - . EKG done. Patient recommended to have Holter monitor , echo and NST. His NST done on 11/18/20 which revealed inferior wall ischemia   He was referred for cardiac cath   s/p Cardiac cath revealed 2 vessel disease and need for rotoblade intervention on 12/24     a/p   Admit to Hospital due to Comorbidities and need for staged procedure   Post procedure orders and observations   Loaded Plavix 600 mg po today then 75 mg po daily   DAPT: ASA and Plavix daily   Atorvastatin 80 mg po nightly   Continue metoprolol daily   Continue Norvasc daily   Wrist precautions maintained   Bedrest for 2  Ambulate if stable   reviewed plan of care  with patient   Npo after midnight for planned  Rotoblade intervention   EkG in am prior  cardiac cath   AM labs including Type/ Cross for patient to be groin access  for rotoblade   Cardiac rehab information provided / referral and communication to cardiac rehab completed   Follow up with cardiologist     
Interval HPI: no complaints, feels well, eager to go home    Exam  alert and oriented, nad, walking around  MMM  lungs clear  sinus rhythm  no edema    Impression:  71 yo male with HTN, GERD, admitted for elective LHC in the setting of positive stress test.  Underwent PCI to LAD and RCA.  Now stable for discharge.     Plan:  See discharge note from today.  Patient to be discharged home on ASA, brilinta, lipitor, metoprolol.  Plan for outpatient cardiology follow up. Also for urology follow up for BPH.   All questions answered. 
Pt s/p cardiac cath and intervention of RCA with one SOBEIDA and LAD  rotoblade intervention and one SOBEIDA done via RFA tolerated well     Post procedure orders and observations   DAPT: ASA and Plavix   Statin therapy   Beta blocker therapy continue     Groin  precautions maintained   Bedrest for 4 hours   Ambulate if stable may be discharged later tonight   Cardiac rehab information provided / referral and communication to cardiac rehab completed   Follow up with cardiologist 1 week

## 2020-12-25 NOTE — DISCHARGE NOTE NURSING/CASE MANAGEMENT/SOCIAL WORK - PATIENT PORTAL LINK FT
You can access the FollowMyHealth Patient Portal offered by [facility] by registering at the following website: [email or web address]. By joining [facility]’s FollowMyHealth portal, you will also be able to view your health information using other applications (apps) compatible with our system.

## 2021-01-15 DIAGNOSIS — I25.10 ATHEROSCLEROTIC HEART DISEASE OF NATIVE CORONARY ARTERY W/OUT ANGINA PECTORIS: ICD-10-CM

## 2021-03-14 ENCOUNTER — EMERGENCY (EMERGENCY)
Facility: HOSPITAL | Age: 71
LOS: 1 days | Discharge: HOME | End: 2021-03-14
Attending: EMERGENCY MEDICINE
Payer: COMMERCIAL

## 2021-03-14 VITALS
OXYGEN SATURATION: 97 % | DIASTOLIC BLOOD PRESSURE: 79 MMHG | HEART RATE: 65 BPM | TEMPERATURE: 98 F | SYSTOLIC BLOOD PRESSURE: 172 MMHG | RESPIRATION RATE: 16 BRPM

## 2021-03-14 VITALS
TEMPERATURE: 98 F | HEART RATE: 73 BPM | HEIGHT: 70 IN | DIASTOLIC BLOOD PRESSURE: 78 MMHG | SYSTOLIC BLOOD PRESSURE: 165 MMHG | OXYGEN SATURATION: 96 % | RESPIRATION RATE: 18 BRPM

## 2021-03-14 DIAGNOSIS — Z91.013 ALLERGY TO SEAFOOD: ICD-10-CM

## 2021-03-14 DIAGNOSIS — L21.9 SEBORRHEIC DERMATITIS, UNSPECIFIED: ICD-10-CM

## 2021-03-14 DIAGNOSIS — Z79.02 LONG TERM (CURRENT) USE OF ANTITHROMBOTICS/ANTIPLATELETS: ICD-10-CM

## 2021-03-14 DIAGNOSIS — Z79.899 OTHER LONG TERM (CURRENT) DRUG THERAPY: ICD-10-CM

## 2021-03-14 DIAGNOSIS — Z88.1 ALLERGY STATUS TO OTHER ANTIBIOTIC AGENTS STATUS: ICD-10-CM

## 2021-03-14 DIAGNOSIS — I11.9 HYPERTENSIVE HEART DISEASE WITHOUT HEART FAILURE: ICD-10-CM

## 2021-03-14 DIAGNOSIS — E78.00 PURE HYPERCHOLESTEROLEMIA, UNSPECIFIED: ICD-10-CM

## 2021-03-14 DIAGNOSIS — Z79.82 LONG TERM (CURRENT) USE OF ASPIRIN: ICD-10-CM

## 2021-03-14 DIAGNOSIS — Z88.0 ALLERGY STATUS TO PENICILLIN: ICD-10-CM

## 2021-03-14 DIAGNOSIS — E78.5 HYPERLIPIDEMIA, UNSPECIFIED: ICD-10-CM

## 2021-03-14 DIAGNOSIS — Z95.5 PRESENCE OF CORONARY ANGIOPLASTY IMPLANT AND GRAFT: ICD-10-CM

## 2021-03-14 DIAGNOSIS — Z88.8 ALLERGY STATUS TO OTHER DRUGS, MEDICAMENTS AND BIOLOGICAL SUBSTANCES STATUS: ICD-10-CM

## 2021-03-14 DIAGNOSIS — I45.10 UNSPECIFIED RIGHT BUNDLE-BRANCH BLOCK: ICD-10-CM

## 2021-03-14 DIAGNOSIS — R07.89 OTHER CHEST PAIN: ICD-10-CM

## 2021-03-14 DIAGNOSIS — I25.10 ATHEROSCLEROTIC HEART DISEASE OF NATIVE CORONARY ARTERY WITHOUT ANGINA PECTORIS: ICD-10-CM

## 2021-03-14 LAB
ALBUMIN SERPL ELPH-MCNC: 4.4 G/DL — SIGNIFICANT CHANGE UP (ref 3.3–5.2)
ALP SERPL-CCNC: 99 U/L — SIGNIFICANT CHANGE UP (ref 40–120)
ALT FLD-CCNC: 19 U/L — SIGNIFICANT CHANGE UP
ANION GAP SERPL CALC-SCNC: 10 MMOL/L — SIGNIFICANT CHANGE UP (ref 5–17)
AST SERPL-CCNC: 21 U/L — SIGNIFICANT CHANGE UP
BASOPHILS # BLD AUTO: 0.05 K/UL — SIGNIFICANT CHANGE UP (ref 0–0.2)
BASOPHILS NFR BLD AUTO: 0.9 % — SIGNIFICANT CHANGE UP (ref 0–2)
BILIRUB SERPL-MCNC: 1 MG/DL — SIGNIFICANT CHANGE UP (ref 0.4–2)
BUN SERPL-MCNC: 14 MG/DL — SIGNIFICANT CHANGE UP (ref 8–20)
CALCIUM SERPL-MCNC: 9.1 MG/DL — SIGNIFICANT CHANGE UP (ref 8.6–10.2)
CHLORIDE SERPL-SCNC: 104 MMOL/L — SIGNIFICANT CHANGE UP (ref 98–107)
CO2 SERPL-SCNC: 29 MMOL/L — SIGNIFICANT CHANGE UP (ref 22–29)
CREAT SERPL-MCNC: 0.81 MG/DL — SIGNIFICANT CHANGE UP (ref 0.5–1.3)
EOSINOPHIL # BLD AUTO: 0.12 K/UL — SIGNIFICANT CHANGE UP (ref 0–0.5)
EOSINOPHIL NFR BLD AUTO: 2.2 % — SIGNIFICANT CHANGE UP (ref 0–6)
GLUCOSE SERPL-MCNC: 102 MG/DL — HIGH (ref 70–99)
HCT VFR BLD CALC: 39.6 % — SIGNIFICANT CHANGE UP (ref 39–50)
HGB BLD-MCNC: 12.9 G/DL — LOW (ref 13–17)
IMM GRANULOCYTES NFR BLD AUTO: 0.2 % — SIGNIFICANT CHANGE UP (ref 0–1.5)
LYMPHOCYTES # BLD AUTO: 0.92 K/UL — LOW (ref 1–3.3)
LYMPHOCYTES # BLD AUTO: 16.6 % — SIGNIFICANT CHANGE UP (ref 13–44)
MAGNESIUM SERPL-MCNC: 2 MG/DL — SIGNIFICANT CHANGE UP (ref 1.6–2.6)
MCHC RBC-ENTMCNC: 30.2 PG — SIGNIFICANT CHANGE UP (ref 27–34)
MCHC RBC-ENTMCNC: 32.6 GM/DL — SIGNIFICANT CHANGE UP (ref 32–36)
MCV RBC AUTO: 92.7 FL — SIGNIFICANT CHANGE UP (ref 80–100)
MONOCYTES # BLD AUTO: 0.65 K/UL — SIGNIFICANT CHANGE UP (ref 0–0.9)
MONOCYTES NFR BLD AUTO: 11.7 % — SIGNIFICANT CHANGE UP (ref 2–14)
NEUTROPHILS # BLD AUTO: 3.79 K/UL — SIGNIFICANT CHANGE UP (ref 1.8–7.4)
NEUTROPHILS NFR BLD AUTO: 68.4 % — SIGNIFICANT CHANGE UP (ref 43–77)
NT-PROBNP SERPL-SCNC: 344 PG/ML — HIGH (ref 0–300)
PLATELET # BLD AUTO: 216 K/UL — SIGNIFICANT CHANGE UP (ref 150–400)
POTASSIUM SERPL-MCNC: 4.1 MMOL/L — SIGNIFICANT CHANGE UP (ref 3.5–5.3)
POTASSIUM SERPL-SCNC: 4.1 MMOL/L — SIGNIFICANT CHANGE UP (ref 3.5–5.3)
PROT SERPL-MCNC: 7 G/DL — SIGNIFICANT CHANGE UP (ref 6.6–8.7)
RBC # BLD: 4.27 M/UL — SIGNIFICANT CHANGE UP (ref 4.2–5.8)
RBC # FLD: 13.3 % — SIGNIFICANT CHANGE UP (ref 10.3–14.5)
SODIUM SERPL-SCNC: 143 MMOL/L — SIGNIFICANT CHANGE UP (ref 135–145)
TROPONIN T SERPL-MCNC: <0.01 NG/ML — SIGNIFICANT CHANGE UP (ref 0–0.06)
WBC # BLD: 5.54 K/UL — SIGNIFICANT CHANGE UP (ref 3.8–10.5)
WBC # FLD AUTO: 5.54 K/UL — SIGNIFICANT CHANGE UP (ref 3.8–10.5)

## 2021-03-14 PROCEDURE — 93005 ELECTROCARDIOGRAM TRACING: CPT

## 2021-03-14 PROCEDURE — 80053 COMPREHEN METABOLIC PANEL: CPT

## 2021-03-14 PROCEDURE — 84484 ASSAY OF TROPONIN QUANT: CPT

## 2021-03-14 PROCEDURE — 99284 EMERGENCY DEPT VISIT MOD MDM: CPT | Mod: 25

## 2021-03-14 PROCEDURE — 71045 X-RAY EXAM CHEST 1 VIEW: CPT | Mod: 26

## 2021-03-14 PROCEDURE — 36415 COLL VENOUS BLD VENIPUNCTURE: CPT

## 2021-03-14 PROCEDURE — 99285 EMERGENCY DEPT VISIT HI MDM: CPT

## 2021-03-14 PROCEDURE — 85025 COMPLETE CBC W/AUTO DIFF WBC: CPT

## 2021-03-14 PROCEDURE — 83880 ASSAY OF NATRIURETIC PEPTIDE: CPT

## 2021-03-14 PROCEDURE — 93010 ELECTROCARDIOGRAM REPORT: CPT

## 2021-03-14 PROCEDURE — 71045 X-RAY EXAM CHEST 1 VIEW: CPT

## 2021-03-14 PROCEDURE — 83735 ASSAY OF MAGNESIUM: CPT

## 2021-03-14 RX ORDER — ASPIRIN/CALCIUM CARB/MAGNESIUM 324 MG
243 TABLET ORAL ONCE
Refills: 0 | Status: COMPLETED | OUTPATIENT
Start: 2021-03-14 | End: 2021-03-14

## 2021-03-14 RX ADMIN — Medication 243 MILLIGRAM(S): at 17:43

## 2021-03-14 NOTE — ED ADULT NURSE NOTE - NSIMPLEMENTINTERV_GEN_ALL_ED
Implemented All Fall with Harm Risk Interventions:  [place] to call system. Call bell, personal items and telephone within reach. Instruct patient to call for assistance. Room bathroom lighting operational. Non-slip footwear when patient is off stretcher. Physically safe environment: no spills, clutter or unnecessary equipment. Stretcher in lowest position, wheels locked, appropriate side rails in place. Provide visual cue, wrist band, yellow gown, etc. Monitor gait and stability. Monitor for mental status changes and reorient to person, place, and time. Review medications for side effects contributing to fall risk. Reinforce activity limits and safety measures with patient and family. Provide visual clues: red socks.

## 2021-03-14 NOTE — ED PROVIDER NOTE - CARE PROVIDER_API CALL
David Quiroga)  Cardiovascular Disease  [address] [facility], [address] [place] [address]  Phone: [phone]  Fax: [phone]  Follow Up Time: 4-6 Days

## 2021-03-14 NOTE — ED PROVIDER NOTE - PATIENT PORTAL LINK FT
You can access the FollowMyHealth Patient Portal offered by [facility] by registering at the following website: [email or web address]. By joining [employer]’s FollowMyHealth portal, you will also be able to view your health information using other applications (apps) compatible with our system.

## 2021-03-14 NOTE — ED ADULT NURSE NOTE - OBJECTIVE STATEMENT
pt complaining of dull chest tightness after doing prior yard work. chest pain radiated up the left part of neck. denies sob. pt states he had 3 stents placed on 12/24.pt states that his blood pressure has been progressively getting higher through out the week. RR even and unlabored. pt educated on plan of care, pt able to successfully teach back plan of care to RN, RN will continue to reeducate pt during hospital stay.

## 2021-03-14 NOTE — ED ADULT NURSE NOTE - NSFALLRSKOUTCOME_ED_ALL_ED
SS following up with discharge planning. Discharge orders received. Pt completed 
uninsurability questionnaire for [facility] and SS faxed to [facility] Admissions, [phone]; fax 
[phone]. Pt reported that he needed homeless shelter. SS contacted Homeless Shelter 
hotline. Bed avaialable at [facility] at [facility], [address] [facility], 
[address]. Pt able to admit to homeless shelter between 1500 and 1830. Pt's RN contacting RN 
supervisor for cab pass. Fall with [facility]

## 2021-03-14 NOTE — ED PROVIDER NOTE - CLINICAL SUMMARY MEDICAL DECISION MAKING FREE TEXT BOX
69 yo M hx of cad s/p stent x 3. ekg unchanged. 71 yo M hx of cad s/p stent x 3. ekg unchanged. trop negative. cxr clear. patient concerned about BP. chest pain has been present all day since the morning with negative trop.

## 2021-03-14 NOTE — ED ADULT NURSE NOTE - CINV DISCH MEDS REVIEWED YN
You can access the FollowMyHealth Patient Portal offered by [facility] by registering at the following website: [email or web address]. By joining [employer]’s FollowMyHealth portal, you will also be able to view your health information using other applications (apps) compatible with our system.
No

## 2021-03-14 NOTE — ED PROVIDER NOTE - NSFOLLOWUPINSTRUCTIONS_ED_ALL_ED_FT
Chest Pain    Chest pain can be caused by many different conditions which may or may not be dangerous. Causes include heartburn, lung infections, heart attack, blood clot in lungs, skin infections, strain or damage to muscle, cartilage, or bones, etc. In addition to a history and physical examination, an electrocardiogram (ECG) or other lab tests may have been performed to determine the cause of your chest pain. Follow up with your primary care provider or with a cardiologist as instructed.     SEEK IMMEDIATE MEDICAL CARE IF YOU HAVE ANY OF THE FOLLOWING SYMPTOMS: worsening chest pain, coughing up blood, unexplained back/neck/jaw pain, severe abdominal pain, dizziness or lightheadedness, fainting, shortness of breath, sweaty or clammy skin, vomiting, or racing heart beat. These symptoms may represent a serious problem that is an emergency. Do not wait to see if the symptoms will go away. Get medical help right away. Call 911 and do not drive yourself to the hospital.      Hypertension    Hypertension, commonly called high blood pressure, is when the force of blood pumping through your arteries is too strong. Hypertension forces your heart to work harder to pump blood. Your arteries may become narrow or stiff. Having untreated or uncontrolled hypertension for a long period of time can cause heart attack, stroke, kidney disease, and other problems. If started on a medication, take exactly as prescribed by your health care professional. Maintain a healthy lifestyle and follow up with your primary care physician.    SEEK IMMEDIATE MEDICAL CARE IF YOU HAVE ANY OF THE FOLLOWING SYMPTOMS: severe headache, confusion, chest pain, abdominal pain, vomiting, or shortness of breath.

## 2021-03-14 NOTE — ED PROVIDER NOTE - OBJECTIVE STATEMENT
71 yo M hx of HTN, HDL, CAD s/p stent x 3 in december now on asa and plavix p/w left sided chest pressure and hytertension. patient report that he was walking around and felt chest pressure radiate to his neck. no sob. no exersinal component. patient report same intermittent feeling since his stent placement. he took his BP at home and found to be high and it kept going up to sbp 170. It concerned him. 71 yo M hx of HTN, HDL, CAD s/p stent x 3 in december now on asa and plavix p/w left sided chest pressure and hytertension. patient report that he was walking around and felt chest pressure radiate to his neck. no sob. no exersinal component. patient report same intermittent feeling since his stent placement. he took his BP at home and found to be high and it kept going up to sbp 170. It concerned him. patient has been complaint with meds. took asa 81 today.     cards:  69 yo M hx of HTN, HDL, CAD s/p stent x 3 in december now on asa and plavix p/w left sided chest pressure and hytertension. patient report that he was walking around and felt chest pressure radiate to his neck. no sob. no exertional component. patient report same intermittent feeling since his stent placement. he took his BP at home and found to be high and it kept going up to sbp 170. It concerned him. patient has been complaint with meds. took asa 81 today.     cards:

## 2021-03-23 ENCOUNTER — APPOINTMENT (OUTPATIENT)
Dept: UROLOGY | Facility: CLINIC | Age: 71
End: 2021-03-23
Payer: MEDICARE

## 2021-03-23 VITALS — SYSTOLIC BLOOD PRESSURE: 146 MMHG | HEART RATE: 81 BPM | DIASTOLIC BLOOD PRESSURE: 73 MMHG | TEMPERATURE: 97.1 F

## 2021-03-23 LAB
BILIRUB UR QL STRIP: NEGATIVE
CLARITY UR: CLEAR
COLLECTION METHOD: NORMAL
GLUCOSE UR-MCNC: NEGATIVE
HCG UR QL: 0.2 EU/DL
HGB UR QL STRIP.AUTO: NORMAL
KETONES UR-MCNC: NEGATIVE
LEUKOCYTE ESTERASE UR QL STRIP: NEGATIVE
NITRITE UR QL STRIP: NEGATIVE
PH UR STRIP: 7
PROT UR STRIP-MCNC: NEGATIVE
SP GR UR STRIP: 1.01

## 2021-03-23 PROCEDURE — 51798 US URINE CAPACITY MEASURE: CPT

## 2021-03-23 PROCEDURE — 81003 URINALYSIS AUTO W/O SCOPE: CPT | Mod: QW

## 2021-03-23 PROCEDURE — 99072 ADDL SUPL MATRL&STAF TM PHE: CPT

## 2021-03-23 PROCEDURE — 99204 OFFICE O/P NEW MOD 45 MIN: CPT | Mod: 25

## 2021-03-23 NOTE — HISTORY OF PRESENT ILLNESS
[FreeTextEntry1] : 69 yo male presents for Elevated PSA. \par Has history of Elevated PSA, status post negative prostate biopsy and has had MRI Prostate. \par Was following with Dr Flores. Has seen me in the past at [facility]. \par Denies any recent unintentional weight loss, night sweats and new bone or back pain.\par Family history of Prostate cancer- no. \par Reports variable stream, urinates every 2-3 hours or so during the day. Nocturia of 2-3 x. \par Endorses off and on hesitancy and sense of incomplete emptying. Has occasional urinary incontinence at night. \par Denies dysuria, hematuria, lower abdominal or flank pain, fever, chills or rigors.\par Has Erectile dysfunction. Not sexually active.\par \par With Flomax- passed out. Was prescribed Alfuzosin, has not tried yet. Has Cardiac issues. \par \par

## 2021-03-23 NOTE — PHYSICAL EXAM
[Normal Appearance] : normal appearance [General Appearance - In No Acute Distress] : no acute distress [] : no respiratory distress [Abdomen Soft] : soft [Abdomen Tenderness] : non-tender [Costovertebral Angle Tenderness] : no ~M costovertebral angle tenderness [Urethral Meatus] : meatus normal [Penis Abnormality] : normal circumcised penis [Scrotum] : the scrotum was normal [Epididymis] : the epididymides were normal [Testes Tenderness] : no tenderness of the testes [Testes Mass (___cm)] : there were no testicular masses [FreeTextEntry1] : Prostate partially palpated(mid), non tender, no nodule in the palpated part  [Normal Station and Gait] : the gait and station were normal for the patient's age [Skin Color & Pigmentation] : normal skin color and pigmentation [No Focal Deficits] : no focal deficits [Oriented To Time, Place, And Person] : oriented to person, place, and time [No Palpable Adenopathy] : no palpable adenopathy

## 2021-03-23 NOTE — REVIEW OF SYSTEMS
[Eyesight Problems] : eyesight problems [Chest Pain] : chest pain [Abdominal Pain] : abdominal pain [Constipation] : constipation [Heartburn] : heartburn [Poor quality erections] : Poor quality erections [Told you have blood in urine on a urine test] : told blood was present in a urine test [Wake up at night to urinate  How many times?  ___] : wakes up to urinate [unfilled] times during the night [Strain or push to urinate] : strain or push to urinate [Wait a long time to urinate] : waits a long time to urinate [Interrupted urine stream] : interrupted urine stream [Bladder fullness after urinating] : bladder fullness after urinating [Leakage of urine with urgency] : leakage of urine with urgency [Unaware of when urine is leaking] : unaware of when urine is leaking [Negative] : Heme/Lymph

## 2021-03-23 NOTE — LETTER BODY
[Dear  ___] : Dear  [unfilled], [Consult Letter:] : I had the pleasure of evaluating your patient, [unfilled]. [( Thank you for referring [unfilled] for consultation for _____ )] : Thank you for referring [unfilled] for consultation for [unfilled] [Please see my note below.] : Please see my note below. [Consult Closing:] : Thank you very much for allowing me to participate in the care of this patient.  If you have any questions, please do not hesitate to contact me. [Sincerely,] : Sincerely, [FreeTextEntry3] : Roman Sanabria MD\par  of Urology\par [facility]\par \par Offices:\par [facility] @\par [address], [place] [address]\par and\par [address], [place] [address], [address]\par and\par [address] [address]\par \par TEL: [phone]\par FAX: [phone]

## 2021-03-23 NOTE — ASSESSMENT
[FreeTextEntry1] : Reviewed outside records. \par MRI Prostate(12/21/2018): \par PSA: 9.6 \par Prostate volume- 90 cc\par PI-RADS 2 lesion. \par \par \par Elevated PSA:\par PSA: 6.88;Free%- 40(2/16/21). \par Discussed with the patient that PSA is a substance produced by the prostate gland. Elevated PSA levels may indicate a noncancerous condition such as prostatitis, or an enlarged prostate but it may also indicate prostate cancer.\par Discussed PSA screening and latest recommendations/guidelines- USPTF and AUA. \par Explained that only way to rule out Prostate cancer in a patient with elevated PSA, increased PSA velocity or abnormal digital rectal exam is to do Prostate needle biopsy.\par \par Benign Prostatic Hyperplasia:\par PVR: 86 ml. \par Discussed treatment options: trial of Alfuzosin Vs Finasteride or Avodart. \par Discussed side effect profile. \par Reviewed interaction with current medicines. Will touch base with Cardiologist: Lidya.  \par \par Return to office in 3 months or sooner if any issues- will do Uroflo/PVR. \par

## 2021-04-10 ENCOUNTER — INPATIENT (INPATIENT)
Facility: HOSPITAL | Age: 71
LOS: 1 days | Discharge: HOME | DRG: 303 | End: 2021-04-12
Attending: STUDENT IN AN ORGANIZED HEALTH CARE EDUCATION/TRAINING PROGRAM | Admitting: STUDENT IN AN ORGANIZED HEALTH CARE EDUCATION/TRAINING PROGRAM
Payer: COMMERCIAL

## 2021-04-10 VITALS
HEART RATE: 77 BPM | RESPIRATION RATE: 18 BRPM | TEMPERATURE: 98 F | OXYGEN SATURATION: 99 % | DIASTOLIC BLOOD PRESSURE: 87 MMHG | HEIGHT: 70 IN | WEIGHT: 173.94 LBS | SYSTOLIC BLOOD PRESSURE: 159 MMHG

## 2021-04-10 DIAGNOSIS — R07.9 CHEST PAIN, UNSPECIFIED: ICD-10-CM

## 2021-04-10 LAB
ALBUMIN SERPL ELPH-MCNC: 4.1 G/DL — SIGNIFICANT CHANGE UP (ref 3.3–5.2)
ALP SERPL-CCNC: 88 U/L — SIGNIFICANT CHANGE UP (ref 40–120)
ALT FLD-CCNC: 16 U/L — SIGNIFICANT CHANGE UP
ANION GAP SERPL CALC-SCNC: 10 MMOL/L — SIGNIFICANT CHANGE UP (ref 5–17)
APTT BLD: 32.5 SEC — SIGNIFICANT CHANGE UP (ref 27.5–35.5)
AST SERPL-CCNC: 16 U/L — SIGNIFICANT CHANGE UP
BASOPHILS # BLD AUTO: 0.03 K/UL — SIGNIFICANT CHANGE UP (ref 0–0.2)
BASOPHILS NFR BLD AUTO: 0.5 % — SIGNIFICANT CHANGE UP (ref 0–2)
BILIRUB SERPL-MCNC: 0.5 MG/DL — SIGNIFICANT CHANGE UP (ref 0.4–2)
BUN SERPL-MCNC: 16 MG/DL — SIGNIFICANT CHANGE UP (ref 8–20)
CALCIUM SERPL-MCNC: 8.8 MG/DL — SIGNIFICANT CHANGE UP (ref 8.6–10.2)
CHLORIDE SERPL-SCNC: 103 MMOL/L — SIGNIFICANT CHANGE UP (ref 98–107)
CO2 SERPL-SCNC: 27 MMOL/L — SIGNIFICANT CHANGE UP (ref 22–29)
CREAT SERPL-MCNC: 0.91 MG/DL — SIGNIFICANT CHANGE UP (ref 0.5–1.3)
EOSINOPHIL # BLD AUTO: 0.08 K/UL — SIGNIFICANT CHANGE UP (ref 0–0.5)
EOSINOPHIL NFR BLD AUTO: 1.4 % — SIGNIFICANT CHANGE UP (ref 0–6)
GLUCOSE SERPL-MCNC: 143 MG/DL — HIGH (ref 70–99)
HCT VFR BLD CALC: 35.5 % — LOW (ref 39–50)
HGB BLD-MCNC: 11.6 G/DL — LOW (ref 13–17)
IMM GRANULOCYTES NFR BLD AUTO: 0.4 % — SIGNIFICANT CHANGE UP (ref 0–1.5)
INR BLD: 1.11 RATIO — SIGNIFICANT CHANGE UP (ref 0.88–1.16)
LIDOCAIN IGE QN: 19 U/L — LOW (ref 22–51)
LYMPHOCYTES # BLD AUTO: 0.69 K/UL — LOW (ref 1–3.3)
LYMPHOCYTES # BLD AUTO: 12.2 % — LOW (ref 13–44)
MAGNESIUM SERPL-MCNC: 2.1 MG/DL — SIGNIFICANT CHANGE UP (ref 1.6–2.6)
MCHC RBC-ENTMCNC: 30.4 PG — SIGNIFICANT CHANGE UP (ref 27–34)
MCHC RBC-ENTMCNC: 32.7 GM/DL — SIGNIFICANT CHANGE UP (ref 32–36)
MCV RBC AUTO: 93.2 FL — SIGNIFICANT CHANGE UP (ref 80–100)
MONOCYTES # BLD AUTO: 0.46 K/UL — SIGNIFICANT CHANGE UP (ref 0–0.9)
MONOCYTES NFR BLD AUTO: 8.1 % — SIGNIFICANT CHANGE UP (ref 2–14)
NEUTROPHILS # BLD AUTO: 4.38 K/UL — SIGNIFICANT CHANGE UP (ref 1.8–7.4)
NEUTROPHILS NFR BLD AUTO: 77.4 % — HIGH (ref 43–77)
NT-PROBNP SERPL-SCNC: 242 PG/ML — SIGNIFICANT CHANGE UP (ref 0–300)
PLATELET # BLD AUTO: 190 K/UL — SIGNIFICANT CHANGE UP (ref 150–400)
POTASSIUM SERPL-MCNC: 4.3 MMOL/L — SIGNIFICANT CHANGE UP (ref 3.5–5.3)
POTASSIUM SERPL-SCNC: 4.3 MMOL/L — SIGNIFICANT CHANGE UP (ref 3.5–5.3)
PROT SERPL-MCNC: 6.4 G/DL — LOW (ref 6.6–8.7)
PROTHROM AB SERPL-ACNC: 12.8 SEC — SIGNIFICANT CHANGE UP (ref 10.6–13.6)
RBC # BLD: 3.81 M/UL — LOW (ref 4.2–5.8)
RBC # FLD: 13.4 % — SIGNIFICANT CHANGE UP (ref 10.3–14.5)
SARS-COV-2 RNA SPEC QL NAA+PROBE: SIGNIFICANT CHANGE UP
SODIUM SERPL-SCNC: 140 MMOL/L — SIGNIFICANT CHANGE UP (ref 135–145)
TROPONIN T SERPL-MCNC: <0.01 NG/ML — SIGNIFICANT CHANGE UP (ref 0–0.06)
WBC # BLD: 5.66 K/UL — SIGNIFICANT CHANGE UP (ref 3.8–10.5)
WBC # FLD AUTO: 5.66 K/UL — SIGNIFICANT CHANGE UP (ref 3.8–10.5)

## 2021-04-10 PROCEDURE — 93010 ELECTROCARDIOGRAM REPORT: CPT

## 2021-04-10 PROCEDURE — 99285 EMERGENCY DEPT VISIT HI MDM: CPT

## 2021-04-10 PROCEDURE — 99233 SBSQ HOSP IP/OBS HIGH 50: CPT

## 2021-04-10 PROCEDURE — 99223 1ST HOSP IP/OBS HIGH 75: CPT

## 2021-04-10 RX ORDER — METOPROLOL TARTRATE 50 MG
50 TABLET ORAL DAILY
Refills: 0 | Status: DISCONTINUED | OUTPATIENT
Start: 2021-04-10 | End: 2021-04-12

## 2021-04-10 RX ORDER — AMLODIPINE BESYLATE 2.5 MG/1
5 TABLET ORAL DAILY
Refills: 0 | Status: DISCONTINUED | OUTPATIENT
Start: 2021-04-10 | End: 2021-04-12

## 2021-04-10 RX ORDER — ASPIRIN/CALCIUM CARB/MAGNESIUM 324 MG
81 TABLET ORAL DAILY
Refills: 0 | Status: DISCONTINUED | OUTPATIENT
Start: 2021-04-10 | End: 2021-04-12

## 2021-04-10 RX ORDER — CLOPIDOGREL BISULFATE 75 MG/1
75 TABLET, FILM COATED ORAL DAILY
Refills: 0 | Status: DISCONTINUED | OUTPATIENT
Start: 2021-04-10 | End: 2021-04-12

## 2021-04-10 RX ORDER — ISOSORBIDE DINITRATE 5 MG/1
5 TABLET ORAL THREE TIMES A DAY
Refills: 0 | Status: DISCONTINUED | OUTPATIENT
Start: 2021-04-10 | End: 2021-04-10

## 2021-04-10 RX ORDER — NITROGLYCERIN 6.5 MG
0.4 CAPSULE, EXTENDED RELEASE ORAL
Refills: 0 | Status: DISCONTINUED | OUTPATIENT
Start: 2021-04-10 | End: 2021-04-12

## 2021-04-10 RX ORDER — NITROGLYCERIN 6.5 MG
1 CAPSULE, EXTENDED RELEASE ORAL DAILY
Refills: 0 | Status: DISCONTINUED | OUTPATIENT
Start: 2021-04-10 | End: 2021-04-12

## 2021-04-10 RX ORDER — HEPARIN SODIUM 5000 [USP'U]/ML
5000 INJECTION INTRAVENOUS; SUBCUTANEOUS EVERY 8 HOURS
Refills: 0 | Status: DISCONTINUED | OUTPATIENT
Start: 2021-04-10 | End: 2021-04-12

## 2021-04-10 RX ORDER — ALFUZOSIN HYDROCHLORIDE 10 MG/1
10 TABLET, EXTENDED RELEASE ORAL AT BEDTIME
Refills: 0 | Status: DISCONTINUED | OUTPATIENT
Start: 2021-04-10 | End: 2021-04-12

## 2021-04-10 RX ORDER — PANTOPRAZOLE SODIUM 20 MG/1
40 TABLET, DELAYED RELEASE ORAL
Refills: 0 | Status: DISCONTINUED | OUTPATIENT
Start: 2021-04-10 | End: 2021-04-12

## 2021-04-10 RX ORDER — ATORVASTATIN CALCIUM 80 MG/1
40 TABLET, FILM COATED ORAL AT BEDTIME
Refills: 0 | Status: DISCONTINUED | OUTPATIENT
Start: 2021-04-10 | End: 2021-04-12

## 2021-04-10 RX ADMIN — HEPARIN SODIUM 5000 UNIT(S): 5000 INJECTION INTRAVENOUS; SUBCUTANEOUS at 22:11

## 2021-04-10 RX ADMIN — Medication 1 PATCH: at 10:37

## 2021-04-10 RX ADMIN — Medication 1 PATCH: at 19:30

## 2021-04-10 RX ADMIN — ATORVASTATIN CALCIUM 40 MILLIGRAM(S): 80 TABLET, FILM COATED ORAL at 22:11

## 2021-04-10 RX ADMIN — ALFUZOSIN HYDROCHLORIDE 10 MILLIGRAM(S): 10 TABLET, EXTENDED RELEASE ORAL at 22:17

## 2021-04-10 RX ADMIN — PANTOPRAZOLE SODIUM 40 MILLIGRAM(S): 20 TABLET, DELAYED RELEASE ORAL at 10:36

## 2021-04-10 RX ADMIN — Medication 1 PATCH: at 22:07

## 2021-04-10 RX ADMIN — HEPARIN SODIUM 5000 UNIT(S): 5000 INJECTION INTRAVENOUS; SUBCUTANEOUS at 13:48

## 2021-04-10 NOTE — CONSULT NOTE ADULT - ATTENDING COMMENTS
pt was seen and examined. Chart was reviewed. He has hx of CAD, PCI to LAD and RCA with residual distal RCA/LPDA stenosis medically managed. Effort angina for the past few month, worst now in the past 1.5 months. Responsive to NTG. Took imdur which was dc due to HA.  Also took ranexa but had difficulty with swallowing it.   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  EKG and labs reviewed.   Pt is having worsening angina, for past 1.5 months. CE negative.  Start nitro patch.   Repeat TTE.  Plan for stress test based on response to meds vs cardiac cath.  Cont with other home meds.  DVT prophylaxis.  We will follow with you.

## 2021-04-10 NOTE — CONSULT NOTE ADULT - SUBJECTIVE AND OBJECTIVE BOX
[place] [facility]                                                               Office:  [address] [place] [address]                                                              Telephone: [phone]. Fax:[phone]                                                                        CARDIOLOGY CONSULTATION NOTE                                                                                             Consult requested by:  Dr. Betancur  Reason for Consultation: chest pain  History obtained by: Patient and medical record   obtained: No    COVID Status:    Chief complaint:    Patient is a 70y old  Male who presents with a chief complaint of chest pain    HPI:  FULL NOTE TO FOLLOW     REVIEW OF SYMPTOMS:     CONSTITUTIONAL: No fever, weight loss, or fatigue  ENMT:  No difficulty hearing, tinnitus, vertigo; No sinus or throat pain  NECK: No pain or stiffness  CARDIOVASCULAR: No chest pain, dyspnea, syncope, palpitations, dizziness, Orthopnea, Paroxsymal nocturnal dyspnea  RESPIRATORY: No Dyspnea on exertion, Shortness of breath, cough, wheezing  : No dysuria, no hematuria   GI: No dark color stool, no melena, no diarrhea, no constipation, no abdominal pain   NEURO: No headache, no dizziness, no slurred speech   MUSCULOSKELETAL: No joint pain or swelling; No muscle, back, or extremity pain  PSYCH: No agitation, no anxiety.    ALL OTHER REVIEW OF SYSTEMS ARE NEGATIVE.      PREVIOUS DIAGNOSTIC TESTING  ECHO FINDINGS:  < from: TTE Echo Limited or F/U (12.24.20 @ 11:31) >  Summary:   1. Limited STAT echo bedside in cath lab to r/o pericardial effusion.   2. Left ventricular ejection fraction, by visual estimation, is 50 to 55%.   3. Mid and apical inferior septum, apical lateral segment, and apex are abnormal as described above.   4. Low normal global left ventricular systolic function.   5. Normal left ventricular internal cavity size.   6. Normal right ventricular size and function.   7. There is no evidence of pericardial effusion.    MD Coleman Electronically signedon 12/24/2020 at 2:46:06 PM    < end of copied text >      STRESS FINDINGS:  < from: Nuclear Stress Test-Pharmacologic (11.18.20 @ 09:51) >  IMPRESSIONS:Abnormal Study  * Chest Pain: No chest pain with administration of  Regadenoson.  * Symptom: Shortness of breath.  * HR Response: Appropriate.  * BP Response: Appropriate.  * Heart Rhythm: Normal Sinus Rhythm - 71 BPM.  * Baseline ECG: RBBB.  * ECG Abnormalities: There were no diagnostic changes.  * Arrhythmia: None.  * Small to Medium size fixed defect involving basal and  mid inferior wall with mild goldy infarct ischemia.  *Post-stress resting myocardial perfusion gated SPECT  imaging was performed (LVEF > 70%;LVEDV = 61 ml.)    Conclusion:  Small to Medium size fixed defect involving basal and mid  inferior wall with mild goldy infarct ischemia.    < end of copied text >      CATHETERIZATION FINDINGS:   < from: Cardiac Cath Lab - Adult (12.24.20 @ 12:06) >  CORONARY VESSELS: The coronary circulation is right dominant.  LM:   --  LM: Normal.  LAD:   --  Proximal LAD: There was a 100 % stenosis. The lesion was  associated with a moderate filling defect consistent with thrombus.  CX:   --  Circumflex: Normal.  RCA:   --  Proximal RCA: There was a 0 % stenosis in-stent.  COMPLICATIONS: No complications occurred during the cath lab visit.  DIAGNOSTIC IMPRESSIONS: Acute stent thrombosis of LAD. Unclear etiology. ?  of distal edge dissection but none seen on prior angio/IVUS imaging.  PCI performed with 1 SOBEIDA overlapping previous LAD stent.  DIAGNOSTIC RECOMMENDATIONS: Aspirin and Brilinta.  Patient will be provided with one month supply. After this, will change to  plavix due to monitary reasons. For changing, needs to overlap plavix with  brilinta for 5 days.  INTERVENTIONAL IMPRESSIONS: Acute stent thrombosis of LAD. Unclear  etiology. ? of distal edge dissection but none seen on prior angio/IVUS  imaging.  PCI performed with 1 SOBEIDA overlapping previous LAD stent.  INTERVENTIONAL RECOMMENDATIONS: Aspirin and Brilinta.  Patient will be provided with one month supply. After this, will change to  plavix due to monitary reasons. For changing, needs to overlap plavix with  brilinta for 5 days.  Prepared and signed by  Angélica CORTEZ  Signed 12/24/2020 16:12:42    < end of copied text >    < from: Cardiac Cath Lab - Adult (12.23.20 @ 09:48) >  VENTRICLES: Global left ventricular function was normal. EF calculated by  contrast ventriculography was 60 %.  CORONARY VESSELS: The coronary circulation is co-dominant.  LM:   --  LM: Normal.  LAD:   --  Mid LAD: There was a 70 % stenosis. The lesion was heavily  calcified.  CX:   --  OM1: There was a 30 % stenosis.  RCA:   --  Proximal RCA: There was a 95 % stenosis. In a second lesion,  there was a 70 % stenosis.  --  RPDA: There was a 99 % stenosis.  COMPLICATIONS: No complications occurred during the cath lab visit.  DIAGNOSTIC IMPRESSIONS: Severe RCA and LAD disease.  Normal LV function.  DIAGNOSTIC RECOMMENDATIONS: Discussed surgical intervention vs PCI.  Low to Intermediate syntax score.  Plan for femoral approach and rotational atherectomy and PCI of LAD and RCA  tomorrow.  Prepared and signed by  Primitivo Tomlinson MD  Signed 12/23/2020 18:01:07    < end of copied text >      ALLERGIES: Allergies    azithromycin (Other)  crabs (Hives)  Flomax (Faint)  penicillin (Hives)    Intolerances          PAST MEDICAL HISTORY  HTN (hypertension)    BPH (Benign Prostatic Hyperplasia)    GERD (gastroesophageal reflux disease)    Hypercholesterolemia        PAST SURGICAL HISTORY  S/P cholecystectomy    S/P inguinal hernia repair        FAMILY HISTORY:  No pertinent family history in first degree relatives        SOCIAL HISTORY:    CIGARETTES: Denies  ALCOHOL: Denies  DRUGS: Denies      CURRENT MEDICATIONS:           HOME MEDICATIONS:  ASA 81mg daily  Plavix 75mg daily  Toprol 50mg daily  Amlodipine 5mg daily  Atorvastatin 40mg QHS (muscle cramps w/80mg)      Vital Signs Last 24 Hrs  T(C): 37.1 (10 Apr 2021 08:24), Max: 37.1 (10 Apr 2021 08:24)  T(F): 98.7 (10 Apr 2021 08:24), Max: 98.7 (10 Apr 2021 08:24)  HR: 61 (10 Apr 2021 08:24) (61 - 77)  BP: 141/71 (10 Apr 2021 08:24) (141/71 - 159/87)  BP(mean): --  RR: 18 (10 Apr 2021 08:24) (18 - 18)  SpO2: 99% (10 Apr 2021 08:24) (99% - 99%)      PHYSICAL EXAM:  Appearance: NAD, well nourished	  Neurologic: A&Ox3, PERRL, EOMI, Grossly non-focal with full strength in all four extremities  HEENT:   Normal oral mucosa, sclera non-icteric	  Lymphatic: No cervical lymphadenopathy  Cardiovascular: Normal S1 S2, No JVD, No murmur, No carotid bruits  Respiratory: Lungs clear to auscultation	  Psychiatry: Mood & affect appropriate  Gastrointestinal:  Soft, Non-tender, + BS, no bruits	  Extremities: Normal range of motion, No clubbing, cyanosis or edema  Vascular: Peripheral pulses palpable 2+ bilaterally  Skin: No Erythema, No ecchymoses, No cyanosis, No rashes  Lines and Tubes: n/a    Intake and output:     LABS:                        11.6   5.66  )-----------( 190      ( 10 Apr 2021 06:53 )             35.5     04-10    140  |  103  |  16.0  ----------------------------<  143<H>  4.3   |  27.0  |  0.91    Ca    8.8      10 Apr 2021 06:53  Mg     2.1     04-10    TPro  6.4<L>  /  Alb  4.1  /  TBili  0.5  /  DBili  x   /  AST  16  /  ALT  16  /  AlkPhos  88  04-10    CARDIAC MARKERS ( 10 Apr 2021 06:53 )  x     / <0.01 ng/mL / x     / x     / x        ;p-BNP=Serum Pro-Brain Natriuretic Peptide: 242 pg/mL (04-10 @ 06:53)    PT/INR - ( 10 Apr 2021 06:53 )   PT: 12.8 sec;   INR: 1.11 ratio         PTT - ( 10 Apr 2021 06:53 )  PTT:32.5 sec      INTERPRETATION OF TELEMETRY: Reviewed by me.   ECG: Reviewed by me.     RADIOLOGY & ADDITIONAL STUDIES:    X-ray:  reviewed by me.     CT scan:   MRI:                                                                          [place] [facility]                                                               Office:  [address] [address]                                                              Telephone: [phone]. Fax:[phone]                                                                        CARDIOLOGY CONSULTATION NOTE                                                                                             Consult requested by:  Dr. Betancur  Reason for Consultation: chest pain  History obtained by: Patient and medical record   obtained: No    COVID Status: unknown    Chief complaint:    Patient is a 70y old  Male who presents with a chief complaint of chest pain    HPI:  72yo male w/PMH CAD s/p SOBEIDA x3 (dx cath 12/23/20 that revealed 2 vessel disease significant calcification to RCA and LAD, LHC 12/24/20 for intervention LAD SOBEIDA x1 w/rotoblade, RCA SOBEIDA x1, significant residual disease RPDA, 2nd C 12/24 for acute LAD thrombosis received 2nd LAD stent), HTN, HLD, GERD, BPH.  Patient states he follows cardio Dr. Quiroga at [facility]. He has had intermittent left chest tightness since p/t stents.  After stent placement he feels the chest tightness became worse. He gets left chest tightness w/activity (though not always), describes as a gradual onset that resolves w/about 5min rest, denies associated symptoms or radiation.  He is physically active and does not always have chest tightness.  Unable to tolerate daily Imdur d/t HA, unable to take Ranexa d/t size of pill and chronic difficulty swallowing. Was to be scheduled for outpatient NST though pain this morning was worse than previously and associated with mild dizziness.  Denies palpitations, irregular and/or rapid heart beat, SOB, LYON, syncope/near syncope, orthopnea, PND, cough, edema, f/c, n/v/d, hematuria, or hematochezia.       REVIEW OF SYMPTOMS:     CONSTITUTIONAL: No fever, weight loss, or fatigue  ENMT:  No difficulty hearing, tinnitus, vertigo; No sinus or throat pain  NECK: No pain or stiffness  CARDIOVASCULAR: as per HPI  RESPIRATORY: No Dyspnea on exertion, Shortness of breath, cough, wheezing  : No dysuria, no hematuria   GI: No dark color stool, no melena, no diarrhea, no constipation, no abdominal pain   NEURO: No headache, no dizziness, no slurred speech   MUSCULOSKELETAL: No joint pain or swelling; No muscle, back, or extremity pain  PSYCH: No agitation, no anxiety.    ALL OTHER REVIEW OF SYSTEMS ARE NEGATIVE.      PREVIOUS DIAGNOSTIC TESTING  ECHO FINDINGS:  < from: TTE Echo Limited or F/U (12.24.20 @ 11:31) >  Summary:   1. Limited STAT echo bedside in cath lab to r/o pericardial effusion.   2. Left ventricular ejection fraction, by visual estimation, is 50 to 55%.   3. Mid and apical inferior septum, apical lateral segment, and apex are abnormal as described above.   4. Low normal global left ventricular systolic function.   5. Normal left ventricular internal cavity size.   6. Normal right ventricular size and function.   7. There is no evidence of pericardial effusion.    MD Coleman Electronically signedon 12/24/2020 at 2:46:06 PM    < end of copied text >      STRESS FINDINGS:  < from: Nuclear Stress Test-Pharmacologic (11.18.20 @ 09:51) >  IMPRESSIONS:Abnormal Study  * Chest Pain: No chest pain with administration of  Regadenoson.  * Symptom: Shortness of breath.  * HR Response: Appropriate.  * BP Response: Appropriate.  * Heart Rhythm: Normal Sinus Rhythm - 71 BPM.  * Baseline ECG: RBBB.  * ECG Abnormalities: There were no diagnostic changes.  * Arrhythmia: None.  * Small to Medium size fixed defect involving basal and  mid inferior wall with mild goldy infarct ischemia.  *Post-stress resting myocardial perfusion gated SPECT  imaging was performed (LVEF > 70%;LVEDV = 61 ml.)    Conclusion:  Small to Medium size fixed defect involving basal and mid  inferior wall with mild goldy infarct ischemia.    < end of copied text >      CATHETERIZATION FINDINGS:   < from: Cardiac Cath Lab - Adult (12.24.20 @ 12:06) >  CORONARY VESSELS: The coronary circulation is right dominant.  LM:   --  LM: Normal.  LAD:   --  Proximal LAD: There was a 100 % stenosis. The lesion was  associated with a moderate filling defect consistent with thrombus.  CX:   --  Circumflex: Normal.  RCA:   --  Proximal RCA: There was a 0 % stenosis in-stent.  COMPLICATIONS: No complications occurred during the cath lab visit.  DIAGNOSTIC IMPRESSIONS: Acute stent thrombosis of LAD. Unclear etiology. ?  of distal edge dissection but none seen on prior angio/IVUS imaging.  PCI performed with 1 SOBEIDA overlapping previous LAD stent.  DIAGNOSTIC RECOMMENDATIONS: Aspirin and Brilinta.  Patient will be provided with one month supply. After this, will change to  plavix due to monitary reasons. For changing, needs to overlap plavix with  brilinta for 5 days.  INTERVENTIONAL IMPRESSIONS: Acute stent thrombosis of LAD. Unclear  etiology. ? of distal edge dissection but none seen on prior angio/IVUS  imaging.  PCI performed with 1 SOBEIDA overlapping previous LAD stent.  INTERVENTIONAL RECOMMENDATIONS: Aspirin and Brilinta.  Patient will be provided with one month supply. After this, will change to  plavix due to monitary reasons. For changing, needs to overlap plavix with  brilinta for 5 days.  Prepared and signed by  Angélica CORTEZ  Signed 12/24/2020 16:12:42    < end of copied text >    < from: Cardiac Cath Lab - Adult (12.24.20 @ 07:38) >  VENTRICLES: No LV gram was performed; however, a recent echocardiogram  demonstrated an EF of 55 %.  CORONARY VESSELS: The coronary circulation is right dominant.  LM:   --  LM: Normal.  LAD:   --  Mid LAD: There was a 80 % stenosis. The lesion was moderately  calcified.  CX:   --  Distal circumflex: There was a 40 % stenosis.  RCA:   --  Proximal RCA: There was a diffuse 95 % stenosis.  --  RPDA: There was a 95 % stenosis.  COMPLICATIONS: No complications occurred during the cath lab visit.  DIAGNOSTIC IMPRESSIONS: RCA- diffcult lesion. Tortuous lesion. PCI  performed with 1 SOBEIDA. RPDA with significant disease but not a large  caliber vessel. No intervention performed.  LAD- significant calcium. Rotational atherectomy and PCI performed with 1  SOBEIDA.  DIAGNOSTIC RECOMMENDATIONS: Aspirin and plavix.  INTERVENTIONAL IMPRESSIONS: RCA- diffcult lesion. Tortuous lesion. PCI  performed with 1 SOBEIDA. RPDA with significant disease but not a large  caliber vessel. No intervention performed.  LAD- significant calcium. Rotational atherectomy and PCI performed with 1  SOBEIDA.  INTERVENTIONAL RECOMMENDATIONS: Aspirin and plavix.  Prepared and signed by  Primitivo Tomlinson MD  Signed 12/24/202016:08:41    < end of copied text >      < from: Cardiac Cath Lab - Adult (12.23.20 @ 09:48) >  VENTRICLES: Global left ventricular function was normal. EF calculated by  contrast ventriculography was 60 %.  CORONARY VESSELS: The coronary circulation is co-dominant.  LM:   --  LM: Normal.  LAD:   --  Mid LAD: There was a 70 % stenosis. The lesion was heavily  calcified.  CX:   --  OM1: There was a 30 % stenosis.  RCA:   --  Proximal RCA: There was a 95 % stenosis. In a second lesion,  there was a 70 % stenosis.  --  RPDA: There was a 99 % stenosis.  COMPLICATIONS: No complications occurred during the cath lab visit.  DIAGNOSTIC IMPRESSIONS: Severe RCA and LAD disease.  Normal LV function.  DIAGNOSTIC RECOMMENDATIONS: Discussed surgical intervention vs PCI.  Low to Intermediate syntax score.  Plan for femoral approach and rotational atherectomy and PCI of LAD and RCA  tomorrow.  Prepared and signed by  Primitivo Tomlinson MD  Signed 12/23/2020 18:01:07    < end of copied text >      ALLERGIES: Allergies    azithromycin (Other)  crabs (Hives)  Flomax (Faint)  penicillin (Hives)    Intolerances          PAST MEDICAL HISTORY  HTN (hypertension)    BPH (Benign Prostatic Hyperplasia)    GERD (gastroesophageal reflux disease)    Hypercholesterolemia        PAST SURGICAL HISTORY  S/P cholecystectomy    S/P inguinal hernia repair        FAMILY HISTORY:  No pertinent family history in first degree relatives        SOCIAL HISTORY:    CIGARETTES: Denies  ALCOHOL: Denies  DRUGS: Denies      CURRENT MEDICATIONS:           HOME MEDICATIONS:  ASA 81mg daily  Plavix 75mg daily  Toprol 50mg daily  Amlodipine 5mg daily  Atorvastatin 40mg QHS (muscle cramps w/80mg)      Vital Signs Last 24 Hrs  T(C): 37.1 (10 Apr 2021 08:24), Max: 37.1 (10 Apr 2021 08:24)  T(F): 98.7 (10 Apr 2021 08:24), Max: 98.7 (10 Apr 2021 08:24)  HR: 61 (10 Apr 2021 08:24) (61 - 77)  BP: 141/71 (10 Apr 2021 08:24) (141/71 - 159/87)  BP(mean): --  RR: 18 (10 Apr 2021 08:24) (18 - 18)  SpO2: 99% (10 Apr 2021 08:24) (99% - 99%)      PHYSICAL EXAM:  Appearance: NAD, well nourished	  Neurologic: A&Ox3, PERRL, EOMI, Grossly non-focal with full strength in all four extremities  HEENT:   Normal oral mucosa, sclera non-icteric	  Lymphatic: No cervical lymphadenopathy  Cardiovascular: Normal S1 S2, No JVD, No murmur, No carotid bruits  Respiratory: Lungs clear to auscultation	  Psychiatry: Mood & affect appropriate  Gastrointestinal:  Soft, Non-tender, + BS, no bruits	  Extremities: Normal range of motion, No clubbing, cyanosis or edema  Vascular: Peripheral pulses palpable 2+ bilaterally  Skin: No Erythema, No ecchymoses, No cyanosis, No rashes  Lines and Tubes: n/a    Intake and output:     LABS:                        11.6   5.66  )-----------( 190      ( 10 Apr 2021 06:53 )             35.5     04-10    140  |  103  |  16.0  ----------------------------<  143<H>  4.3   |  27.0  |  0.91    Ca    8.8      10 Apr 2021 06:53  Mg     2.1     04-10    TPro  6.4<L>  /  Alb  4.1  /  TBili  0.5  /  DBili  x   /  AST  16  /  ALT  16  /  AlkPhos  88  04-10    CARDIAC MARKERS ( 10 Apr 2021 06:53 )  x     / <0.01 ng/mL / x     / x     / x        ;p-BNP=Serum Pro-Brain Natriuretic Peptide: 242 pg/mL (04-10 @ 06:53)    PT/INR - ( 10 Apr 2021 06:53 )   PT: 12.8 sec;   INR: 1.11 ratio         PTT - ( 10 Apr 2021 06:53 )  PTT:32.5 sec      INTERPRETATION OF TELEMETRY: Reviewed by me.   ECG: Reviewed by me.     RADIOLOGY & ADDITIONAL STUDIES:    X-ray:     CT scan:   MRI:

## 2021-04-10 NOTE — CONSULT NOTE ADULT - ASSESSMENT
FULL NOTE TO FOLLOW  72yo male w/PMH CAD s/p SOBEIDA x3 (dx cath 12/23/20 that revealed 2 vessel disease significant calcification to RCA and LAD, LHC 12/24/20 for intervention LAD SOBEIDA x1 w/rotoblade, RCA SOBEIDA x1, significant residual disease RPDA, 2nd [facility] 12/24 for acute LAD thrombosis received 2nd LAD stent), HTN, HLD, GERD, BPH.  Patient states he follows cardio Dr. Quiroga at [facility]. He has had intermittent left chest tightness since p/t stents.  After stent placement he feels the chest tightness became worse. He gets left chest tightness w/activity (though not always), describes as a gradual onset that resolves w/about 5min rest, denies associated symptoms or radiation.  He is physically active and does not always have chest tightness.  Unable to tolerate daily Imdur d/t HA, unable to take Ranexa d/t size of pill and chronic difficulty swallowing. Was to be scheduled for outpatient NST though pain this morning was worse than previously and associated with mild dizziness.  Denies palpitations, irregular and/or rapid heart beat, SOB, LYON, syncope/near syncope, orthopnea, PND, cough, edema, f/c, n/v/d, hematuria, or hematochezia.     Chest Pain  -EKG w/o acute ischemia  -Trop neg x1, serial x2  -TTE  -nitro 0.2mg/hr patch daily, off at bedtime  -c/w DAPT, Toprol, statin  -NPO after MN for Exercise stress test Monday 4/12    HTN  -c/w Toprol and Amlodidpine    HLD  -c/w Lipitor 40mg PO qHS    Carol Galaviz NP  [phone]     Assessment and recommendations are final when note is signed by the attending.

## 2021-04-10 NOTE — ED PROVIDER NOTE - OBJECTIVE STATEMENT
69yo male with pmh of CAD s/p stent 12/2020, HTN and HLD presents with chest tightness. Pt states he woke this morning and then went to the bathroom and while walking started to have chest tightness sternal non radiating, relieved by rest. Pt states earlier this week with chest tightness while fertilizing the lawn and at that time also relieved by rest. Pt states he was given isosorbide nitrate earlier this year for chest pain but doesn't take it because it give him a headache. Pt took plavix and aspirin this morning  Pt denies fevers/chills, ha, loc, focal neuro deficits, sob/palp, cough, abd pain/n/v/d, urinary symptoms, recent travel and sick contacts.  Cards - Dr. Hernandez

## 2021-04-10 NOTE — ED ADULT TRIAGE NOTE - CHIEF COMPLAINT QUOTE
patient states that he has been having chest tightness for the past 2 hours, has cardiac HX with 3 stents placed in december

## 2021-04-10 NOTE — ED ADULT NURSE REASSESSMENT NOTE - NS ED NURSE REASSESS COMMENT FT1
Pt  moved over to CDU , verbal report given to Kathy PAGE, all questions answered, pt in no distress at this time care endorsed to holding nurse
LAte entry : Pt received in the stretcher resting comfortably, breathing easy and unlabored, denies chest pain , cardiology PA at the bedside , will continue to monitor

## 2021-04-10 NOTE — H&P ADULT - NSHPREVIEWOFSYSTEMS_GEN_ALL_CORE
Review of Systems:  CONSTITUTIONAL: No weakness, fevers or chills  EYES/ENT: No visual changes;  No vertigo or throat pain   NECK: No pain or stiffness  RESPIRATORY: No cough, wheezing, hemoptysis; No shortness of breath,   CARDIOVASCULAR: No palpitations, + Chest pain  GASTROINTESTINAL: No abdominal or epigastric pain. No nausea, vomiting, or hematemesis; No diarrhea or constipation.   GENITOURINARY: No dysuria, frequency or hematuria  NEUROLOGICAL: No numbness or weakness  SKIN: No itching, burning, rashes, or lesions   All other review of systems is negative unless indicated above

## 2021-04-10 NOTE — ED PROVIDER NOTE - CLINICAL SUMMARY MEDICAL DECISION MAKING FREE TEXT BOX
71yo male with pmh of CAD s/p stent 12/2020, HTN and HLD presents with chest tightness. labs, trop, ekg (unchanged from prior 3/2021 sinus RBBB no acute ischemic changes), cards

## 2021-04-10 NOTE — ED ADULT NURSE NOTE - OBJECTIVE STATEMENT
Assumed pt care @ this time. Pt received A&Ox4 c/o chest tightness. Pt states he had 3 cardiac stents placed on 12/24/2020. Since the procedure pt states he gets intermittent chest tightness upon exertion. Pt states he came to ED today because chest tightness has been consistent since 0230. Pt states "I felt whoozy and not myself this am" Pt states he takes plavix. Pt denies SOB, HA, dizziness, NVD,  symptoms. Pt placed on CM w/. Respirations even & unlabored. NAD present. Pt made aware of plan of care and verbalized understanding.

## 2021-04-10 NOTE — ED ADULT NURSE NOTE - INTERVENTIONS DEFINITIONS
[place] to call system/Instruct patient to call for assistance/Stretcher in lowest position, wheels locked, appropriate side rails in place/Monitor gait and stability/Monitor for mental status changes and reorient to person, place, and time/Review medications for side effects contributing to fall risk/Reinforce activity limits and safety measures with patient and family

## 2021-04-10 NOTE — H&P ADULT - NSHPSOCIALHISTORY_GEN_ALL_CORE
Lives at home with his wife and ambulates independently  Does not smoke or use illicit drugs, Occasional etoh

## 2021-04-10 NOTE — ED ADULT NURSE REASSESSMENT NOTE - COMFORT CARE
assisted to bathroom/plan of care explained/po fluids offered/side rails down/treatment delay explained/wait time explained/warm blanket provided

## 2021-04-10 NOTE — H&P ADULT - HISTORY OF PRESENT ILLNESS
71 yo male with hx of HTN, HLD, CAD x 3 stents, BPH and GERD presented with complaints of chest tightness. Patient reports that this has been present with episodes of worsening chest tightness for the past four months since the placement of the stents. He reports that he has followed with cardiology outpatient and tried to take Ranexa but was unable to tolerate given the size of the pill and failed imdur as he had headaches. He reports that his cardiologist outpatient wanted him to have a stress test. He reports that this morning he woke up with chest tightness located substernal with no radiation, that improved with rest and worsened with activity. He currently states to be feeling well and has no current complaints. Seen by cardiology in the ED and recommended admission for stress test vs cardiac cath on Monday.

## 2021-04-10 NOTE — H&P ADULT - ASSESSMENT
69 yo male with hx of HTN, HLD, CAD x 3 stents, BPH and GERD presented with complaints of chest tightness. Admitted for chest pain and to rule out ACS.       #Chest pain to rule out acute coronary syndrome in setting of CAD with stents  Tele monitoring  Trops and EKG  TTE  Cardiology consulted  Scheduled for either stress test vs cardiac cath on Monday  Nitro PRN    #HTN  Continue with Metoprolol 50mg daily  Continue with norvasc 5 mg daily  Monitor BP    #HLD  Continue with Atorvastatin 40mg QHS  (Patient reports that he only takes 1/2 a tab of the 80mg at home and that his pcp/cardiology are aware)    #GERD  C/w Protonix    #BPH    #DVT prophylaxis: Hep [id number]     71 yo male with hx of HTN, HLD, CAD x 3 stents, BPH and GERD presented with complaints of chest tightness. Admitted for chest pain and to rule out ACS.       #Chest pain to rule out acute coronary syndrome in setting of CAD with stents  Tele monitoring  Trops and EKG  TTE  Cardiology consulted  Scheduled for either stress test vs cardiac cath on Monday  Nitro PRN    #HTN  Continue with Metoprolol 50mg daily  Continue with norvasc 5 mg daily  Monitor BP    #HLD  Continue with Atorvastatin 40mg QHS  (Patient reports that he only takes 1/2 a tab of the 80mg at home and that his pcp/cardiology are aware)    #GERD  C/w Protonix    #BPH  C/w Alfuzosin 10 mg     #DVT prophylaxis: Hep [id number]

## 2021-04-10 NOTE — H&P ADULT - NSHPPHYSICALEXAM_GEN_ALL_CORE
PHYSICAL EXAM:  Vital Signs Last 24 Hrs  T(F): 98.7 (10 Apr 2021 08:24), Max: 98.7 (10 Apr 2021 08:24)  HR: 61 (10 Apr 2021 08:24) (61 - 77)  BP: 141/71 (10 Apr 2021 08:24) (141/71 - 159/87)  RR: 18 (10 Apr 2021 08:24) (18 - 18)  SpO2: 99% (10 Apr 2021 08:24) (99% - 99%)    GENERAL: NAD, Resting in bed  Eyes: EOMI, PERRLA  ENMT: Conjunctiva and sclera clear; supple neck, No JVD  Cardiovascular: S1,S2, RRR, No murmur  Respiratory: CTA B/L, Non-labored breathing  GI: Bowel sounds present; Soft, Nontender, Nondistended. No hepatomegaly  Genitourinary: Deferred  Skin:  no breakdowns, ulcers or discharge, No rashes or lesions  Neurology: Alert & Oriented X3, non-focal and spontaneous movements of all extremities, CN 2 to 12 grossly intact   Psych: Appropriate mood and affect, calm, pleasant, Responds appropriately to questions

## 2021-04-11 LAB
A1C WITH ESTIMATED AVERAGE GLUCOSE RESULT: 6.1 % — HIGH (ref 4–5.6)
ALBUMIN SERPL ELPH-MCNC: 4 G/DL — SIGNIFICANT CHANGE UP (ref 3.3–5.2)
ALP SERPL-CCNC: 87 U/L — SIGNIFICANT CHANGE UP (ref 40–120)
ALT FLD-CCNC: 16 U/L — SIGNIFICANT CHANGE UP
ANION GAP SERPL CALC-SCNC: 12 MMOL/L — SIGNIFICANT CHANGE UP (ref 5–17)
AST SERPL-CCNC: 15 U/L — SIGNIFICANT CHANGE UP
BASOPHILS # BLD AUTO: 0.04 K/UL — SIGNIFICANT CHANGE UP (ref 0–0.2)
BASOPHILS NFR BLD AUTO: 0.7 % — SIGNIFICANT CHANGE UP (ref 0–2)
BILIRUB SERPL-MCNC: 0.7 MG/DL — SIGNIFICANT CHANGE UP (ref 0.4–2)
BUN SERPL-MCNC: 12 MG/DL — SIGNIFICANT CHANGE UP (ref 8–20)
CALCIUM SERPL-MCNC: 9 MG/DL — SIGNIFICANT CHANGE UP (ref 8.6–10.2)
CHLORIDE SERPL-SCNC: 104 MMOL/L — SIGNIFICANT CHANGE UP (ref 98–107)
CHOLEST SERPL-MCNC: 116 MG/DL — SIGNIFICANT CHANGE UP
CO2 SERPL-SCNC: 27 MMOL/L — SIGNIFICANT CHANGE UP (ref 22–29)
COVID-19 SPIKE DOMAIN AB INTERP: POSITIVE
COVID-19 SPIKE DOMAIN ANTIBODY RESULT: 134 U/ML — HIGH
CREAT SERPL-MCNC: 0.94 MG/DL — SIGNIFICANT CHANGE UP (ref 0.5–1.3)
EOSINOPHIL # BLD AUTO: 0.1 K/UL — SIGNIFICANT CHANGE UP (ref 0–0.5)
EOSINOPHIL NFR BLD AUTO: 1.8 % — SIGNIFICANT CHANGE UP (ref 0–6)
ESTIMATED AVERAGE GLUCOSE: 128 MG/DL — HIGH (ref 68–114)
GLUCOSE SERPL-MCNC: 129 MG/DL — HIGH (ref 70–99)
HCT VFR BLD CALC: 38.2 % — LOW (ref 39–50)
HCV AB S/CO SERPL IA: 0.19 S/CO — SIGNIFICANT CHANGE UP (ref 0–0.99)
HCV AB SERPL-IMP: SIGNIFICANT CHANGE UP
HDLC SERPL-MCNC: 49 MG/DL — SIGNIFICANT CHANGE UP
HGB BLD-MCNC: 12.6 G/DL — LOW (ref 13–17)
IMM GRANULOCYTES NFR BLD AUTO: 0.4 % — SIGNIFICANT CHANGE UP (ref 0–1.5)
LIPID PNL WITH DIRECT LDL SERPL: 54 MG/DL — SIGNIFICANT CHANGE UP
LYMPHOCYTES # BLD AUTO: 1.01 K/UL — SIGNIFICANT CHANGE UP (ref 1–3.3)
LYMPHOCYTES # BLD AUTO: 18.5 % — SIGNIFICANT CHANGE UP (ref 13–44)
MCHC RBC-ENTMCNC: 29.9 PG — SIGNIFICANT CHANGE UP (ref 27–34)
MCHC RBC-ENTMCNC: 33 GM/DL — SIGNIFICANT CHANGE UP (ref 32–36)
MCV RBC AUTO: 90.5 FL — SIGNIFICANT CHANGE UP (ref 80–100)
MONOCYTES # BLD AUTO: 0.55 K/UL — SIGNIFICANT CHANGE UP (ref 0–0.9)
MONOCYTES NFR BLD AUTO: 10.1 % — SIGNIFICANT CHANGE UP (ref 2–14)
NEUTROPHILS # BLD AUTO: 3.73 K/UL — SIGNIFICANT CHANGE UP (ref 1.8–7.4)
NEUTROPHILS NFR BLD AUTO: 68.5 % — SIGNIFICANT CHANGE UP (ref 43–77)
NON HDL CHOLESTEROL: 67 MG/DL — SIGNIFICANT CHANGE UP
PLATELET # BLD AUTO: 215 K/UL — SIGNIFICANT CHANGE UP (ref 150–400)
POTASSIUM SERPL-MCNC: 4 MMOL/L — SIGNIFICANT CHANGE UP (ref 3.5–5.3)
POTASSIUM SERPL-SCNC: 4 MMOL/L — SIGNIFICANT CHANGE UP (ref 3.5–5.3)
PROT SERPL-MCNC: 6.5 G/DL — LOW (ref 6.6–8.7)
RBC # BLD: 4.22 M/UL — SIGNIFICANT CHANGE UP (ref 4.2–5.8)
RBC # FLD: 13.2 % — SIGNIFICANT CHANGE UP (ref 10.3–14.5)
SARS-COV-2 IGG+IGM SERPL QL IA: 134 U/ML — HIGH
SARS-COV-2 IGG+IGM SERPL QL IA: POSITIVE
SODIUM SERPL-SCNC: 143 MMOL/L — SIGNIFICANT CHANGE UP (ref 135–145)
TRIGL SERPL-MCNC: 67 MG/DL — SIGNIFICANT CHANGE UP
WBC # BLD: 5.45 K/UL — SIGNIFICANT CHANGE UP (ref 3.8–10.5)
WBC # FLD AUTO: 5.45 K/UL — SIGNIFICANT CHANGE UP (ref 3.8–10.5)

## 2021-04-11 PROCEDURE — 99232 SBSQ HOSP IP/OBS MODERATE 35: CPT

## 2021-04-11 RX ADMIN — PANTOPRAZOLE SODIUM 40 MILLIGRAM(S): 20 TABLET, DELAYED RELEASE ORAL at 05:54

## 2021-04-11 RX ADMIN — HEPARIN SODIUM 5000 UNIT(S): 5000 INJECTION INTRAVENOUS; SUBCUTANEOUS at 05:54

## 2021-04-11 RX ADMIN — HEPARIN SODIUM 5000 UNIT(S): 5000 INJECTION INTRAVENOUS; SUBCUTANEOUS at 14:59

## 2021-04-11 RX ADMIN — HEPARIN SODIUM 5000 UNIT(S): 5000 INJECTION INTRAVENOUS; SUBCUTANEOUS at 20:29

## 2021-04-11 RX ADMIN — CLOPIDOGREL BISULFATE 75 MILLIGRAM(S): 75 TABLET, FILM COATED ORAL at 10:21

## 2021-04-11 RX ADMIN — Medication 1 PATCH: at 19:13

## 2021-04-11 RX ADMIN — ALFUZOSIN HYDROCHLORIDE 10 MILLIGRAM(S): 10 TABLET, EXTENDED RELEASE ORAL at 20:29

## 2021-04-11 RX ADMIN — ATORVASTATIN CALCIUM 40 MILLIGRAM(S): 80 TABLET, FILM COATED ORAL at 20:29

## 2021-04-11 RX ADMIN — AMLODIPINE BESYLATE 5 MILLIGRAM(S): 2.5 TABLET ORAL at 05:54

## 2021-04-11 RX ADMIN — Medication 50 MILLIGRAM(S): at 05:54

## 2021-04-11 RX ADMIN — Medication 1 PATCH: at 20:30

## 2021-04-11 RX ADMIN — Medication 81 MILLIGRAM(S): at 10:21

## 2021-04-11 RX ADMIN — Medication 1 PATCH: at 10:21

## 2021-04-11 NOTE — PROGRESS NOTE ADULT - SUBJECTIVE AND OBJECTIVE BOX
[facility]                                                       [facility]                                                        Office: [address] [address]                                                       Telephone: [phone]. Fax:[phone]                                                                             PROGRESS NOTE   Reason for follow up:  chest pain                            Overnight: No new events.   Update:   Patient sitting up in bed, eating breakfast with no new complaints, on RA, Telemetry SR with no acute alarms noted.    Subjective: "It was more of a tightness   Complains of:  Nothing  Review of symptoms: Cardiac:  No chest pain. No dyspnea. No palpitations.  Respiratory: no cough. No dyspnea  Gastrointestinal: No diarrhea. No abdominal pain. No bleeding.     Past medical history: No updates.   Chronic conditions: w/PMH CAD s/p SOBEIDA x3 (dx cath 12/23/20 that revealed 2 vessel disease significant calcification to RCA and LAD, C 12/24/20 for intervention LAD SOBEIDA x1 w/rotoblade, RCA SOBEIDA x1, significant residual disease RPDA, 2nd LHC 12/24 for acute LAD thrombosis received 2nd LAD stent), HTN, HLD, GERD, BPH      Vitals:  T(C): 36.6 (04-11-21 @ 10:25), Max: 36.9 (04-10-21 @ 17:30)  HR: 77 (04-11-21 @ 10:25) (65 - 87)  BP: 127/75 (04-11-21 @ 10:25) (127/75 - 161/79)  RR: 18 (04-11-21 @ 10:25) (16 - 18)  SpO2: 99% (04-11-21 @ 10:25) (96% - 99%)  I&O's Summary    Weight (kg): 78.9 (04-10 @ 06:10)    PHYSICAL EXAM:  Appearance: Comfortable. No acute distress  HEENT:  Head and neck: Atraumatic. Normocephalic.  Normal oral mucosa, PERRL, Neck is supple. No JVD   Neurologic: A & O x 3, no focal deficits.   Cardiovascular: Normal S1 S2, No murmur, rubs/gallops. No JVD, No edema  Respiratory: Lungs clear to auscultation  Gastrointestinal:  Soft, Non-tender, + BS  Lower Extremities: No edema  Psychiatry: Patient is calm. No agitation. Mood & affect appropriate  Skin: No rash, warm and dry    CURRENT MEDICATIONS:  alfuzosin 10 milliGRAM(s) Oral at bedtime  amLODIPine   Tablet 5 milliGRAM(s) Oral daily  metoprolol succinate ER 50 milliGRAM(s) Oral daily  nitroglycerin    SubLingual 0.4 milliGRAM(s) SubLingual every 5 minutes PRN  nitroglycerin   Patch 0.2 mG/Hr(s) 1 patch Transdermal daily  pantoprazole  Tablet  atorvastatin  aspirin  chewable  clopidogrel Tablet  heparin   Injectable    LABS:	 	  CARDIAC MARKERS ( 10 Apr 2021 15:33 )  x     / <0.01 ng/mL / x     / x     / x      p-BNP 10 Apr 2021 15:33: x    , CARDIAC MARKERS ( 10 Apr 2021 10:58 )  x     / <0.01 ng/mL / x     / x     / x      p-BNP 10 Apr 2021 10:58: x    , CARDIAC MARKERS ( 10 Apr 2021 06:53 )  x     / <0.01 ng/mL / x     / x     / x      p-BNP 10 Apr 2021 06:53: 242 pg/mL                          12.6   5.45  )-----------( 215      ( 11 Apr 2021 06:32 )             38.2   04-11  143  |  104  |  12.0  ----------------------------<  129<H>  4.0   |  27.0  |  0.94  Ca    9.0      11 Apr 2021 06:32  Mg     2.1     04-10  TPro  6.5<L>  /  Alb  4.0  /  TBili  0.7  /  DBili  x   /  AST  15  /  ALT  16  /  AlkPhos  87  04-11  proBNP: Serum Pro-Brain Natriuretic Peptide: 242 pg/mL (04-10 @ 06:53)  Lipid Profile: Date: 04-11 @ 06:32  Total cholesterol 116; Direct LDL: --; HDL: 49; Triglycerides:67    TELEMETRY: Reviewed

## 2021-04-11 NOTE — PROGRESS NOTE ADULT - ASSESSMENT
74yo male w/PMH CAD s/p SOBEIDA x3 (dx cath 12/23/20 that revealed 2 vessel disease significant calcification to RCA and LAD, LHC 12/24/20 for intervention LAD SOBEIDA x1 w/rotoblade, RCA SOBEIDA x1, significant residual disease RPDA, 2nd [facility] 12/24 for acute LAD thrombosis received 2nd LAD stent), HTN, HLD, GERD, BPH.  Patient states he follows cardio Dr. Quiroga at [facility]. He has had intermittent left chest tightness since p/t stents.  After stent placement he feels the chest tightness became worse. He gets left chest tightness w/activity (though not always), describes as a gradual onset that resolves w/about 5min rest, denies associated symptoms or radiation.  He is physically active and does not always have chest tightness.  Unable to tolerate daily Imdur d/t HA, unable to take Ranexa d/t size of pill and chronic difficulty swallowing. Was to be scheduled for outpatient NST though pain this morning was worse than previously and associated with mild dizziness.  Denies palpitations, irregular and/or rapid heart beat, SOB, LYON, syncope/near syncope, orthopnea, PND, cough, edema, f/c, n/v/d, hematuria, or hematochezia.     Chest Pain  -EKG w/o acute ischemia  -Trop neg x1, serial x2  -TTE  -nitro 0.2mg/hr patch daily, off at bedtime  -c/w DAPT, Toprol, statin  -NPO after MN for Exercise stress test Monday 4/12    HTN  -c/w Toprol and Amlodipine    HLD  -c/w Lipitor 40mg PO qHS

## 2021-04-11 NOTE — PROGRESS NOTE ADULT - SUBJECTIVE AND OBJECTIVE BOX
[facility] Division of Hospital Medicine  Karlo Roque [phone]    Chief Complaint:  Patient is a 70y old  Male who presents with a chief complaint of Chest pain (10 Apr 2021 09:18)      SUBJECTIVE / OVERNIGHT EVENTS:  Pt seen and examined at bedside. No acute events reported overnight. Continues to complain of intermittent chest tightness that radiates to his R arm. Otherwise, no new complaints.    Patient denies SOB, abd pain, N/V, fever, chills, dysuria or any other complaints. All remainder ROS negative.     MEDICATIONS  (STANDING):  alfuzosin 10 milliGRAM(s) Oral at bedtime  amLODIPine   Tablet 5 milliGRAM(s) Oral daily  aspirin  chewable 81 milliGRAM(s) Oral daily  atorvastatin 40 milliGRAM(s) Oral at bedtime  clopidogrel Tablet 75 milliGRAM(s) Oral daily  heparin   Injectable 5000 Unit(s) SubCutaneous every 8 hours  metoprolol succinate ER 50 milliGRAM(s) Oral daily  nitroglycerin    Patch 0.2 mG/Hr(s) 1 patch Transdermal daily  pantoprazole    Tablet 40 milliGRAM(s) Oral before breakfast    MEDICATIONS  (PRN):  nitroglycerin     SubLingual 0.4 milliGRAM(s) SubLingual every 5 minutes PRN Chest Pain        I&O's Summary      PHYSICAL EXAM:  Vital Signs Last 24 Hrs  T(C): 36.6 (11 Apr 2021 10:25), Max: 36.9 (10 Apr 2021 17:30)  T(F): 97.8 (11 Apr 2021 10:25), Max: 98.4 (10 Apr 2021 17:30)  HR: 77 (11 Apr 2021 10:25) (65 - 87)  BP: 127/75 (11 Apr 2021 10:25) (127/75 - 161/79)  BP(mean): 101 (10 Apr 2021 13:31) (101 - 101)  RR: 18 (11 Apr 2021 10:25) (16 - 18)  SpO2: 99% (11 Apr 2021 10:25) (96% - 99%)        CONSTITUTIONAL: NAD, well-developed, well-groomed  HEENT: NC/AT, PERRL, no JVD  RESPIRATORY: CTA bilaterally, normal effort  CARDIOVASCULAR: RRR, S1/S2+, no m/g/r  ABDOMEN: Nontender to palpation, normoactive bowel sounds, no rebound/guarding; No hepatosplenomegaly  MUSCLOSKELETAL: No edema, cyanosis or deformities.  PSYCH: A+O to person, place, and time; affect appropriate  NEUROLOGY: CN 2-12 are intact and symmetric; no gross neurological deficits.  SKIN: No rashes; no palpable lesions  VASC: Distal pulses palpable    LABS:                        12.6   5.45  )-----------( 215      ( 11 Apr 2021 06:32 )             38.2     04-11    143  |  104  |  12.0  ----------------------------<  129<H>  4.0   |  27.0  |  0.94    Ca    9.0      11 Apr 2021 06:32  Mg     2.1     04-10    TPro  6.5<L>  /  Alb  4.0  /  TBili  0.7  /  DBili  x   /  AST  15  /  ALT  16  /  AlkPhos  87  04-11    PT/INR - ( 10 Apr 2021 06:53 )   PT: 12.8 sec;   INR: 1.11 ratio         PTT - ( 10 Apr 2021 06:53 )  PTT:32.5 sec  CARDIAC MARKERS ( 10 Apr 2021 15:33 )  x     / <0.01 ng/mL / x     / x     / x      CARDIAC MARKERS ( 10 Apr 2021 10:58 )  x     / <0.01 ng/mL / x     / x     / x      CARDIAC MARKERS ( 10 Apr 2021 06:53 )  x     / <0.01 ng/mL / x     / x     / x              CAPILLARY BLOOD GLUCOSE            RADIOLOGY & ADDITIONAL TESTS:  Results Reviewed:   Imaging Personally Reviewed:  Electrocardiogram Personally Reviewed:

## 2021-04-11 NOTE — PROGRESS NOTE ADULT - ASSESSMENT
69 yo male with hx of HTN, HLD, CAD x 3 stents, BPH and GERD presented with complaints of chest tightness. Admitted for chest pain and to rule out ACS.     #Chest pain to rule out acute coronary syndrome in setting of CAD with stents  - Telemetry  - Trop neg x 3  - EKG unremarkable  - Cardio following  - Nitro prn  - TTE  - Possible stress vs LHC tomorrow, will keep NPO at midnight.    #HTN  - Continue with Metoprolol 50mg daily  - Continue with Norvasc 5 mg daily    #HLD  - Continue with Atorvastatin 40mg QHS  - (Patient reports that he only takes 1/2 a tab of the 80mg at home and that his pcp/cardiology are aware)    #GERD  - C/w Protonix    #BPH  - C/w Alfuzosin 10 mg     #DVT prophylaxis: Hep [id number]    Dispo/LOS anticipation: Pending clinical course.

## 2021-04-12 ENCOUNTER — TRANSCRIPTION ENCOUNTER (OUTPATIENT)
Age: 71
End: 2021-04-12

## 2021-04-12 VITALS
HEART RATE: 85 BPM | OXYGEN SATURATION: 99 % | TEMPERATURE: 98 F | SYSTOLIC BLOOD PRESSURE: 103 MMHG | DIASTOLIC BLOOD PRESSURE: 64 MMHG | RESPIRATION RATE: 18 BRPM

## 2021-04-12 PROCEDURE — 85025 COMPLETE CBC W/AUTO DIFF WBC: CPT

## 2021-04-12 PROCEDURE — 83036 HEMOGLOBIN GLYCOSYLATED A1C: CPT

## 2021-04-12 PROCEDURE — U0005: CPT

## 2021-04-12 PROCEDURE — 93017 CV STRESS TEST TRACING ONLY: CPT

## 2021-04-12 PROCEDURE — 93016 CV STRESS TEST SUPVJ ONLY: CPT

## 2021-04-12 PROCEDURE — 99285 EMERGENCY DEPT VISIT HI MDM: CPT

## 2021-04-12 PROCEDURE — 80053 COMPREHEN METABOLIC PANEL: CPT

## 2021-04-12 PROCEDURE — U0003: CPT

## 2021-04-12 PROCEDURE — 99239 HOSP IP/OBS DSCHRG MGMT >30: CPT

## 2021-04-12 PROCEDURE — 86803 HEPATITIS C AB TEST: CPT

## 2021-04-12 PROCEDURE — 85730 THROMBOPLASTIN TIME PARTIAL: CPT

## 2021-04-12 PROCEDURE — 99232 SBSQ HOSP IP/OBS MODERATE 35: CPT

## 2021-04-12 PROCEDURE — A9500: CPT

## 2021-04-12 PROCEDURE — 78452 HT MUSCLE IMAGE SPECT MULT: CPT

## 2021-04-12 PROCEDURE — 86769 SARS-COV-2 COVID-19 ANTIBODY: CPT

## 2021-04-12 PROCEDURE — 80061 LIPID PANEL: CPT

## 2021-04-12 PROCEDURE — 83735 ASSAY OF MAGNESIUM: CPT

## 2021-04-12 PROCEDURE — 83880 ASSAY OF NATRIURETIC PEPTIDE: CPT

## 2021-04-12 PROCEDURE — 78452 HT MUSCLE IMAGE SPECT MULT: CPT | Mod: 26

## 2021-04-12 PROCEDURE — 85610 PROTHROMBIN TIME: CPT

## 2021-04-12 PROCEDURE — 84484 ASSAY OF TROPONIN QUANT: CPT

## 2021-04-12 PROCEDURE — 93018 CV STRESS TEST I&R ONLY: CPT

## 2021-04-12 PROCEDURE — 83690 ASSAY OF LIPASE: CPT

## 2021-04-12 PROCEDURE — 93005 ELECTROCARDIOGRAM TRACING: CPT

## 2021-04-12 PROCEDURE — 36415 COLL VENOUS BLD VENIPUNCTURE: CPT

## 2021-04-12 RX ORDER — NITROGLYCERIN 6.5 MG
2 CAPSULE, EXTENDED RELEASE ORAL ONCE
Refills: 0 | Status: DISCONTINUED | OUTPATIENT
Start: 2021-04-12 | End: 2021-04-12

## 2021-04-12 RX ORDER — NITROGLYCERIN 6.5 MG
1 CAPSULE, EXTENDED RELEASE ORAL
Qty: 30 | Refills: 0
Start: 2021-04-12 | End: 2021-05-11

## 2021-04-12 RX ADMIN — Medication 81 MILLIGRAM(S): at 10:16

## 2021-04-12 RX ADMIN — Medication 1 PATCH: at 13:49

## 2021-04-12 RX ADMIN — HEPARIN SODIUM 5000 UNIT(S): 5000 INJECTION INTRAVENOUS; SUBCUTANEOUS at 05:11

## 2021-04-12 RX ADMIN — Medication 50 MILLIGRAM(S): at 10:17

## 2021-04-12 RX ADMIN — CLOPIDOGREL BISULFATE 75 MILLIGRAM(S): 75 TABLET, FILM COATED ORAL at 10:16

## 2021-04-12 RX ADMIN — PANTOPRAZOLE SODIUM 40 MILLIGRAM(S): 20 TABLET, DELAYED RELEASE ORAL at 05:11

## 2021-04-12 RX ADMIN — AMLODIPINE BESYLATE 5 MILLIGRAM(S): 2.5 TABLET ORAL at 05:11

## 2021-04-12 NOTE — DISCHARGE NOTE PROVIDER - NSDCFUSCHEDAPPT_GEN_ALL_CORE_FT
NAVI UPTON ; 06/15/2021 ; [facility]  NAVI UPTON ; 06/15/2021 ; [facility] [address] [facility]

## 2021-04-12 NOTE — PROGRESS NOTE ADULT - SUBJECTIVE AND OBJECTIVE BOX
[facility]                                                       [facility]                                                               Office: [address] [address]                                                              Telephone: [phone]. Fax:[phone]                                                                             PROGRESS NOTE  Reason for follow up: Chest Pain  Overnight: No new events.   Update: 4/12: Pt denies chest pain, palpitations, SOB. Tele- NSR HR @ 80-110s. TTE-EF 50-55%, mid and apical inferior septum, apical lateral segment, and apex are abnormal, low normal global left ventricular sys. fx., no evidence of pericardial effusion. NST-There is a medium sized, moderate defect in basal to mid inferior  wall that is fixed, suggestive of infarction  with minimal goldy-infarct ischemia. * The basal inferior wall is akinetic, other walls are hyperdynamic. Overall LVEF is preserved, >75%     Subjective: No new events    	  Vitals:  T(C): 36.8 (04-12-21 @ 09:44), Max: 36.8 (04-11-21 @ 20:28)  HR: 93 (04-12-21 @ 09:44) (74 - 93)  BP: 124/72 (04-12-21 @ 09:44) (124/72 - 146/76)  RR: 18 (04-12-21 @ 09:44) (17 - 18)  SpO2: 100% (04-12-21 @ 09:44) (94% - 100%)    I&O's Summary    Weight (kg): 78.9 (04-10 @ 06:10)      PHYSICAL EXAM:  Appearance: Comfortable. No acute distress  HEENT:  Head and neck: Atraumatic. Normocephalic.  Normal oral mucosa, PERRL, Neck is supple. No JVD, No carotid bruit.   Neurologic: A & O x 3, no focal deficits. EOMI.  Lymphatic: No cervical lymphadenopathy  Cardiovascular: Normal S1 S2, No murmur, rubs/gallops. No JVD, No edema  Respiratory: Lungs clear to auscultation  Gastrointestinal:  Soft, Non-tender, + BS  Lower Extremities: No edema  Psychiatry: Patient is calm. No agitation. Mood & affect appropriate  Skin: No rashes/ ecchymoses/cyanosis/ulcers visualized on the face, hands or feet.      CURRENT MEDICATIONS:  alfuzosin 10 milliGRAM(s) Oral at bedtime  amLODIPine   Tablet 5 milliGRAM(s) Oral daily  metoprolol succinate ER 50 milliGRAM(s) Oral daily  nitroglycerin     SubLingual 0.4 milliGRAM(s) SubLingual every 5 minutes PRN  nitroglycerin    Patch 0.2 mG/Hr(s) 1 patch Transdermal daily  pantoprazole    Tablet  atorvastatin  aspirin  chewable  clopidogrel Tablet  heparin   Injectable      DIAGNOSTIC TESTING:  [ ] Echocardiogram: < from: TTE Echo Limited or F/U (12.24.20 @ 11:31) >  Summary:   1. Limited STAT echo bedside in cath lab to r/o pericardial effusion.   2. Left ventricular ejection fraction, by visual estimation, is 50 to 55%.   3. Mid and apical inferior septum, apical lateral segment, and apex are abnormal as described above.   4. Low normal global left ventricular systolic function.   5. Normal left ventricular internal cavity size.   6. Normal right ventricular size and function.   7. There is no evidence of pericardial effusion.    [ ]  Catheterization: < from: Cardiac Cath Lab - Adult (12.24.20 @ 12:06) >  CORONARY VESSELS: The coronary circulation is right dominant.  LM:   --  LM: Normal.  LAD:   --  Proximal LAD: There was a 100 % stenosis. The lesion was  associated with a moderate filling defect consistent with thrombus.  CX:   --  Circumflex: Normal.  RCA:   --  Proximal RCA: There was a 0 % stenosis in-stent.  COMPLICATIONS: No complications occurred during the cath lab visit.  DIAGNOSTIC IMPRESSIONS: Acute stent thrombosis of LAD. Unclear etiology. ?  of distal edge dissection but none seen on prior angio/IVUS imaging.  PCI performed with 1 SOBEIDA overlapping previous LAD stent.  DIAGNOSTIC RECOMMENDATIONS: Aspirin and Brilinta.  Patient will be provided with one month supply. After this, will change to  plavix due to monitary reasons. For changing, needs to overlap plavix with  brilinta for 5 days.  INTERVENTIONAL IMPRESSIONS: Acute stent thrombosis of LAD. Unclear  etiology. ? of distal edge dissection but none seen on prior angio/IVUS  imaging.  PCI performed with 1 SOBEIDA overlapping previous LAD stent.  INTERVENTIONAL RECOMMENDATIONS: Aspirin and Brilinta.  Patient will be provided with one month supply. After this, will change to  plavix due to monitary reasons. For changing, needs to overlap plavix with  brilinta for 5 days.      [ ] Stress Test:  < from: Nuclear Stress Test-Pharmacologic (04.12.21 @ 07:07) >  IMPRESSIONS:  * Review of raw data shows: Minor motion artifact.  * There is a medium sized, moderate defect in basal to mid  inferior  wall that is fixed, suggestive of infarction  with minimal goldy-infarct ischemia.  * The basal inferior wall is akinetic, other walls are  hyperdynamic. Overall LVEF is preserved, >75%.  * Refused to walk said he had exercise intolerance  * Chest Pain: No chest pain with administration of  Regadenoson.  * Symptom: dyspnea.  * HR Response: Appropriate.  * BP Response: Appropriate.  * Heart Rhythm: Normal Sinus Rhythm.  * ECG Abnormalities: There were no diagnostic changes.  * Arrhythmia: None.    OTHER: 	    XRAY: < from: Xray Chest 1 View- PORTABLE-Urgent (03.14.21 @ 18:30) >    IMPRESSION: No acute cardiopulmonary disease process.      LABS:	 	  CARDIAC MARKERS ( 10 Apr 2021 15:33 )  x     / <0.01 ng/mL / x     / x     / x      p-BNP 10 Apr 2021 15:33: x    , CARDIAC MARKERS ( 10 Apr 2021 10:58 )  x     / <0.01 ng/mL / x     / x     / x      p-BNP 10 Apr 2021 10:58: x    , CARDIAC MARKERS ( 10 Apr 2021 06:53 )  x     / <0.01 ng/mL / x     / x     / x      p-BNP 10 Apr 2021 06:53: 242 pg/mL                          12.6   5.45  )-----------( 215      ( 11 Apr 2021 06:32 )             38.2     04-11    143  |  104  |  12.0  ----------------------------<  129<H>  4.0   |  27.0  |  0.94    Ca    9.0      11 Apr 2021 06:32    TPro  6.5<L>  /  Alb  4.0  /  TBili  0.7  /  DBili  x   /  AST  15  /  ALT  16  /  AlkPhos  87  04-11    proBNP: Serum Pro-Brain Natriuretic Peptide: 242 pg/mL (04-10 @ 06:53)    Lipid Profile: Date: 04-11 @ 06:32  Total cholesterol 116; Direct LDL: --; HDL: 49; Triglycerides:67    TELEMETRY: NSR HR @ 80-110s    	                                                                      [place] [facility]                                                       [facility]                                                               Office: [address] [address]                                                              Telephone: [phone]. Fax:[phone]                                                                             PROGRESS NOTE  Reason for follow up: Chest Pain  Overnight: No new events.   Update: 4/12: Pt denies chest pain, palpitations, SOB. Tele- NSR HR @ 80-110s. TTE-EF 50-55%, mid and apical inferior septum, apical lateral segment, and apex are abnormal, low normal global left ventricular sys. fx., no evidence of pericardial effusion. NST-There is a medium sized, moderate defect in basal to mid inferior  wall that is fixed, suggestive of infarction  with minimal goldy-infarct ischemia. * The basal inferior wall is akinetic, other walls are hyperdynamic. Overall LVEF is preserved, >75%. Pt may be DC'd from cardiology perspective and follow up in outpatient cardiology office.     Subjective: No new events    	  Vitals:  T(C): 36.8 (04-12-21 @ 09:44), Max: 36.8 (04-11-21 @ 20:28)  HR: 93 (04-12-21 @ 09:44) (74 - 93)  BP: 124/72 (04-12-21 @ 09:44) (124/72 - 146/76)  RR: 18 (04-12-21 @ 09:44) (17 - 18)  SpO2: 100% (04-12-21 @ 09:44) (94% - 100%)    I&O's Summary    Weight (kg): 78.9 (04-10 @ 06:10)      PHYSICAL EXAM:  Appearance: Comfortable. No acute distress  HEENT:  Head and neck: Atraumatic. Normocephalic.  Normal oral mucosa, PERRL, Neck is supple. No JVD, No carotid bruit.   Neurologic: A & O x 3, no focal deficits. EOMI.  Lymphatic: No cervical lymphadenopathy  Cardiovascular: Normal S1 S2, No murmur, rubs/gallops. No JVD, No edema  Respiratory: Lungs clear to auscultation  Gastrointestinal:  Soft, Non-tender, + BS  Lower Extremities: No edema  Psychiatry: Patient is calm. No agitation. Mood & affect appropriate  Skin: No rashes/ ecchymoses/cyanosis/ulcers visualized on the face, hands or feet.      CURRENT MEDICATIONS:  alfuzosin 10 milliGRAM(s) Oral at bedtime  amLODIPine   Tablet 5 milliGRAM(s) Oral daily  metoprolol succinate ER 50 milliGRAM(s) Oral daily  nitroglycerin     SubLingual 0.4 milliGRAM(s) SubLingual every 5 minutes PRN  nitroglycerin    Patch 0.2 mG/Hr(s) 1 patch Transdermal daily  pantoprazole    Tablet  atorvastatin  aspirin  chewable  clopidogrel Tablet  heparin   Injectable      DIAGNOSTIC TESTING:  [ ] Echocardiogram: < from: TTE Echo Limited or F/U (12.24.20 @ 11:31) >  Summary:   1. Limited STAT echo bedside in cath lab to r/o pericardial effusion.   2. Left ventricular ejection fraction, by visual estimation, is 50 to 55%.   3. Mid and apical inferior septum, apical lateral segment, and apex are abnormal as described above.   4. Low normal global left ventricular systolic function.   5. Normal left ventricular internal cavity size.   6. Normal right ventricular size and function.   7. There is no evidence of pericardial effusion.    [ ]  Catheterization: < from: Cardiac Cath Lab - Adult (12.24.20 @ 12:06) >  CORONARY VESSELS: The coronary circulation is right dominant.  LM:   --  LM: Normal.  LAD:   --  Proximal LAD: There was a 100 % stenosis. The lesion was  associated with a moderate filling defect consistent with thrombus.  CX:   --  Circumflex: Normal.  RCA:   --  Proximal RCA: There was a 0 % stenosis in-stent.  COMPLICATIONS: No complications occurred during the cath lab visit.  DIAGNOSTIC IMPRESSIONS: Acute stent thrombosis of LAD. Unclear etiology. ?  of distal edge dissection but none seen on prior angio/IVUS imaging.  PCI performed with 1 SOBEIDA overlapping previous LAD stent.  DIAGNOSTIC RECOMMENDATIONS: Aspirin and Brilinta.  Patient will be provided with one month supply. After this, will change to  plavix due to monitary reasons. For changing, needs to overlap plavix with  brilinta for 5 days.  INTERVENTIONAL IMPRESSIONS: Acute stent thrombosis of LAD. Unclear  etiology. ? of distal edge dissection but none seen on prior angio/IVUS  imaging.  PCI performed with 1 SOBEIDA overlapping previous LAD stent.  INTERVENTIONAL RECOMMENDATIONS: Aspirin and Brilinta.  Patient will be provided with one month supply. After this, will change to  plavix due to monitary reasons. For changing, needs to overlap plavix with  brilinta for 5 days.      [ ] Stress Test:  < from: Nuclear Stress Test-Pharmacologic (04.12.21 @ 07:07) >  IMPRESSIONS:  * Review of raw data shows: Minor motion artifact.  * There is a medium sized, moderate defect in basal to mid  inferior  wall that is fixed, suggestive of infarction  with minimal goldy-infarct ischemia.  * The basal inferior wall is akinetic, other walls are  hyperdynamic. Overall LVEF is preserved, >75%.  * Refused to walk said he had exercise intolerance  * Chest Pain: No chest pain with administration of  Regadenoson.  * Symptom: dyspnea.  * HR Response: Appropriate.  * BP Response: Appropriate.  * Heart Rhythm: Normal Sinus Rhythm.  * ECG Abnormalities: There were no diagnostic changes.  * Arrhythmia: None.    OTHER: 	    XRAY: < from: Xray Chest 1 View- PORTABLE-Urgent (03.14.21 @ 18:30) >    IMPRESSION: No acute cardiopulmonary disease process.      LABS:	 	  CARDIAC MARKERS ( 10 Apr 2021 15:33 )  x     / <0.01 ng/mL / x     / x     / x      p-BNP 10 Apr 2021 15:33: x    , CARDIAC MARKERS ( 10 Apr 2021 10:58 )  x     / <0.01 ng/mL / x     / x     / x      p-BNP 10 Apr 2021 10:58: x    , CARDIAC MARKERS ( 10 Apr 2021 06:53 )  x     / <0.01 ng/mL / x     / x     / x      p-BNP 10 Apr 2021 06:53: 242 pg/mL                          12.6   5.45  )-----------( 215      ( 11 Apr 2021 06:32 )             38.2     04-11    143  |  104  |  12.0  ----------------------------<  129<H>  4.0   |  27.0  |  0.94    Ca    9.0      11 Apr 2021 06:32    TPro  6.5<L>  /  Alb  4.0  /  TBili  0.7  /  DBili  x   /  AST  15  /  ALT  16  /  AlkPhos  87  04-11    proBNP: Serum Pro-Brain Natriuretic Peptide: 242 pg/mL (04-10 @ 06:53)    Lipid Profile: Date: 04-11 @ 06:32  Total cholesterol 116; Direct LDL: --; HDL: 49; Triglycerides:67    TELEMETRY: NSR HR @ 80-110s

## 2021-04-12 NOTE — DISCHARGE NOTE PROVIDER - NSDCMRMEDTOKEN_GEN_ALL_CORE_FT
alfuzosin 10 mg oral tablet, extended release: 1 tab(s) orally once a day  amLODIPine 5 mg oral tablet: 1 tab(s) orally once a day  aspirin 81 mg oral tablet, chewable: 1 tab(s) orally once a day  atorvastatin 80 mg oral tablet: 0.5 tab(s) orally once a day (at bedtime)  clopidogrel 75 mg oral tablet: 1 tab(s) orally once a day - take everyday     metoprolol succinate 50 mg oral tablet, extended release: 1 tab(s) orally once a day  Nitro-Dur 0.2 mg/hr transdermal film, extended release: 1 patch transdermal once a day, 12 hours on and 12 hours off (at nighttime)  pantoprazole 40 mg oral delayed release tablet: 1 tab(s) orally once a day

## 2021-04-12 NOTE — DISCHARGE NOTE NURSING/CASE MANAGEMENT/SOCIAL WORK - PATIENT PORTAL LINK FT
You can access the FollowMyHealth Patient Portal offered by [facility] by registering at the following website: [email or web address]. By joining [facility]’s FollowMyHealth portal, you will also be able to view your health information using other applications (apps) compatible with our system.

## 2021-04-12 NOTE — PROGRESS NOTE ADULT - ASSESSMENT
72yo male w/PMH CAD s/p SOBEIDA x3 (dx cath 12/23/20 that revealed 2 vessel disease significant calcification to RCA and LAD, LHC 12/24/20 for intervention LAD SOBEIDA x1 w/rotoblade, RCA SOBEIDA x1, significant residual disease RPDA, 2nd C 12/24 for acute LAD thrombosis received 2nd LAD stent), HTN, HLD, GERD, BPH.  Patient states he follows cardio Dr. Quiroga at [facility]. He has had intermittent left chest tightness since p/t stents.  After stent placement he feels the chest tightness became worse. He gets left chest tightness w/activity (though not always), describes as a gradual onset that resolves w/about 5min rest, denies associated symptoms or radiation.  He is physically active and does not always have chest tightness.  Unable to tolerate daily Imdur d/t HA, unable to take Ranexa d/t size of pill and chronic difficulty swallowing. Was to be scheduled for outpatient NST though pain this morning was worse than previously and associated with mild dizziness.  Denies palpitations, irregular and/or rapid heart beat, SOB, LYON, syncope/near syncope, orthopnea, PND, cough, edema, f/c, n/v/d, hematuria, or hematochezia.     4/12: Pt denies chest pain, palpitations, SOB. Tele- NSR HR @ 80-110s. TTE-EF 50-55%, mid and apical inferior septum, apical lateral segment, and apex are abnormal, low normal global left ventricular sys. fx., no evidence of pericardial effusion. NST-There is a medium sized, moderate defect in basal to mid inferior  wall that is fixed, suggestive of infarction  with minimal goldy-infarct ischemia. * The basal inferior wall is akinetic, other walls are hyperdynamic. Overall LVEF is preserved, >75%      Chest Pain  -EKG w/o acute ischemia  -Trop neg x1, serial x2  -TTE-EF 50-55%, mid and apical inferior septum, apical lateral segment, and apex are abnormal, low normal global left ventricular sys. fx., no evidence of pericardial effusion  -nitro 0.2mg/hr patch daily, off at bedtime  -c/w DAPT, Toprol, statin  -NST-There is a medium sized, moderate defect in basal to mid  inferior  wall that is fixed, suggestive of infarction  with minimal goldy-infarct ischemia.  * The basal inferior wall is akinetic, other walls are  hyperdynamic. Overall LVEF is preserved, >75%     HTN  -c/w Toprol and Amlodipine    HLD  -c/w Lipitor 40mg PO qHS   74yo male w/PMH CAD s/p SOBEIDA x3 (dx cath 12/23/20 that revealed 2 vessel disease significant calcification to RCA and LAD, LHC 12/24/20 for intervention LAD SOBEIDA x1 w/rotoblade, RCA SOBEIDA x1, significant residual disease RPDA, 2nd C 12/24 for acute LAD thrombosis received 2nd LAD stent), HTN, HLD, GERD, BPH.  Patient states he follows cardio Dr. Quiroga at [facility]. He has had intermittent left chest tightness since p/t stents.  After stent placement he feels the chest tightness became worse. He gets left chest tightness w/activity (though not always), describes as a gradual onset that resolves w/about 5min rest, denies associated symptoms or radiation.  He is physically active and does not always have chest tightness.  Unable to tolerate daily Imdur d/t HA, unable to take Ranexa d/t size of pill and chronic difficulty swallowing. Was to be scheduled for outpatient NST though pain this morning was worse than previously and associated with mild dizziness.  Denies palpitations, irregular and/or rapid heart beat, SOB, LYON, syncope/near syncope, orthopnea, PND, cough, edema, f/c, n/v/d, hematuria, or hematochezia.     4/12: Pt denies chest pain, palpitations, SOB. Tele- NSR HR @ 80-110s. TTE-EF 50-55%, mid and apical inferior septum, apical lateral segment, and apex are abnormal, low normal global left ventricular sys. fx., no evidence of pericardial effusion. NST-There is a medium sized, moderate defect in basal to mid inferior  wall that is fixed, suggestive of infarction  with minimal goldy-infarct ischemia. * The basal inferior wall is akinetic, other walls are hyperdynamic. Overall LVEF is preserved, >75%. Pt may be DC'd from cardiology perspective and follow up in outpatient cardiology office.     Chest Pain  -EKG w/o acute ischemia  -Trop neg x1, serial x2  -TTE-EF 50-55%, mid and apical inferior septum, apical lateral segment, and apex are abnormal, low normal global left ventricular sys. fx., no evidence of pericardial effusion  -nitro 0.2mg/hr patch daily, off at bedtime  -c/w DAPT, Toprol, statin  -NST-There is a medium sized, moderate defect in basal to mid  inferior  wall that is fixed, suggestive of infarction  with minimal goldy-infarct ischemia.  * The basal inferior wall is akinetic, other walls are  hyperdynamic. Overall LVEF is preserved, >75%     HTN  -c/w Toprol and Amlodipine    HLD  -c/w Lipitor 40mg PO qHS

## 2021-04-12 NOTE — DISCHARGE NOTE PROVIDER - CARE PROVIDER_API CALL
David Quiroga)  Cardiovascular Disease  [address] [facility], [address] [place] [address]  Phone: [phone]  Fax: [phone]  Follow Up Time: 2 weeks    PMD,   Phone: (   )    -  Fax: (   )    -  Follow Up Time:

## 2021-04-12 NOTE — DISCHARGE NOTE PROVIDER - NSDCCPCAREPLAN_GEN_ALL_CORE_FT
PRINCIPAL DISCHARGE DIAGNOSIS  Diagnosis: Chest pain, unspecified type  Assessment and Plan of Treatment: Follow up with Cardiology as scheduled and sooner if any symptoms. Take medications as reconciled.

## 2021-04-12 NOTE — DISCHARGE NOTE PROVIDER - HOSPITAL COURSE
72yo male w/PMH CAD s/p SOBEIDA x3 (dx cath 20 that revealed 2 vessel disease significant calcification to RCA and LAD, LHC 20 for intervention LAD SOBEIDA x1 w/rotoblade, RCA SOBEIDA x1, significant residual disease RPDA, 2nd [facility]  for acute LAD thrombosis received 2nd LAD stent), HTN, HLD, GERD, BPH who was admitted to Medicine for r/o ACS. Cardiology consulted. Troponin neg x 3. TTE done and EF 50-55%, mid and apical inferior septum, apical lateral segment, and apex are abnormal, low normal global left ventricular sys. fx., no evidence of pericardial effusion. NST was done and showed "a medium sized, moderate defect in basal to mid  inferior  wall that is fixed, suggestive of infarctionwith minimal goldy-infarct ischemia. The basal inferior wall is akinetic, other walls are  hyperdynamic. Overall LVEF is preserved, >75%." Patient did not require a cath per Cardio and is to follow up outpt w/ Cardio and be discharged on Nitro patch.    Patient currently asymptomatic. No longer requires inpt hospitalization and is stable for discharge w/ outpt follow up.     D/c time spent coordinatin mins    Vital Signs Last 24 Hrs  T(F): 98.3 (2021 09:44), Max: 98.3 (2021 09:44)  HR: 93 (2021 09:44) (74 - 93)  BP: 124/72 (2021 09:44) (124/72 - 146/76)  RR: 18 (2021 09:44) (17 - 18)  SpO2: 100% (2021 09:44) (94% - 100%)    Physical Exam:  Constitutional: Appears stated aged, not- chronically ill-appearing, laying comfortably in bed in NAD  HEENT: NC/AT, PERRL, EOMI, trachea midline, no JVD  Respiratory: CTA bilaterally, symmetrical chest rise  Cardiovascular: RRR, no m/g/r  Gastrointestinal: Soft, NT/ND, BS+  Vascular: 2+ peripheral pulses  Neurological: A/O x 3, no focal neurological deficits  Psych: Fair mood/affect  Musculoskeletal: No edema, cyanosis, deformities. ROM normal  Skin: No obvious rash, lesions. No jaundice.

## 2021-04-12 NOTE — PROGRESS NOTE ADULT - ATTENDING COMMENTS
pt seen and examined  plan of care dw np and pt  response to ntiro-patch is good so far  plan for stress test in am.
pt seen and examined. Mild HA from nitropatch otherwise ok  no more cp.   stress test results dw pt.   no need for cath at this time  He will be DC on nitropatch and FU in office.  JIM Monique.

## 2021-06-15 ENCOUNTER — APPOINTMENT (OUTPATIENT)
Dept: UROLOGY | Facility: CLINIC | Age: 71
End: 2021-06-15
Payer: MEDICARE

## 2021-06-15 VITALS — TEMPERATURE: 97.4 F

## 2021-06-15 PROCEDURE — 51798 US URINE CAPACITY MEASURE: CPT

## 2021-06-15 PROCEDURE — 99213 OFFICE O/P EST LOW 20 MIN: CPT | Mod: 25

## 2021-06-22 NOTE — ASSESSMENT
[FreeTextEntry1] : Benign Prostatic Hyperplasia:\par Discussed treatment options.\par Will take Alfuzosin. \par \par Elevated PSA:\par PSA before follow up appointment. \par \par Return to office in 2 months or sooner if any issues- will do Uroflo/PVR. \par Will consider anti cholinergic or B3 agonist.

## 2021-06-22 NOTE — LETTER BODY
[Dear  ___] : Dear  [unfilled], [Courtesy Letter:] : I had the pleasure of seeing your patient, [unfilled], in my office today. [Please see my note below.] : Please see my note below. [Sincerely,] : Sincerely, [FreeTextEntry3] : Roman Sanabria MD\par  of Urology\par [facility]\par \par Offices:\par [facility] @\par [address], [place] [address]\par and\par [address] [facility] [facility], [place] [address], [address]\par and\par [address] [address]\par \par TEL: [phone]\par FAX: [phone]

## 2021-06-22 NOTE — HISTORY OF PRESENT ILLNESS
[FreeTextEntry1] : 71 yo male presents for\par Took Alfuzosin for 25 days, had better flow, but more frequency. No no side effects. \par Stopped. Now weak stream back to urination like before. \par \par Initially seen for Elevated PSA. \par Has history of Elevated PSA, status post negative prostate biopsy and has had MRI Prostate. \par Was following with Dr Crum and [place]. Had seen me in the past at [facility]. \par Denied any recent unintentional weight loss, night sweats and new bone or back pain.\par Family history of Prostate cancer- no. \par Reported variable stream, urinates every 2-3 hours or so during the day. Nocturia of 2-3 x. \par Endorsed off and on hesitancy and sense of incomplete emptying. Has occasional urinary incontinence at night. \par Denied dysuria, hematuria, lower abdominal or flank pain, fever, chills or rigors.\par Has Erectile dysfunction. Not sexually active.\par \par With Flomax- passed out. Was prescribed Alfuzosin, had not tried. Has Cardiac issues. \par \par

## 2021-06-30 ENCOUNTER — EMERGENCY (EMERGENCY)
Facility: HOSPITAL | Age: 71
LOS: 1 days | Discharge: HOME | End: 2021-06-30
Attending: STUDENT IN AN ORGANIZED HEALTH CARE EDUCATION/TRAINING PROGRAM
Payer: COMMERCIAL

## 2021-06-30 VITALS
RESPIRATION RATE: 18 BRPM | DIASTOLIC BLOOD PRESSURE: 80 MMHG | OXYGEN SATURATION: 98 % | HEART RATE: 77 BPM | TEMPERATURE: 98 F | SYSTOLIC BLOOD PRESSURE: 155 MMHG

## 2021-06-30 VITALS
RESPIRATION RATE: 20 BRPM | HEART RATE: 69 BPM | OXYGEN SATURATION: 99 % | SYSTOLIC BLOOD PRESSURE: 164 MMHG | DIASTOLIC BLOOD PRESSURE: 89 MMHG

## 2021-06-30 DIAGNOSIS — I20.8 OTHER FORMS OF ANGINA PECTORIS: ICD-10-CM

## 2021-06-30 LAB
ALBUMIN SERPL ELPH-MCNC: 4.2 G/DL — SIGNIFICANT CHANGE UP (ref 3.3–5.2)
ALP SERPL-CCNC: 87 U/L — SIGNIFICANT CHANGE UP (ref 40–120)
ALT FLD-CCNC: 15 U/L — SIGNIFICANT CHANGE UP
ANION GAP SERPL CALC-SCNC: 9 MMOL/L — SIGNIFICANT CHANGE UP (ref 5–17)
AST SERPL-CCNC: 16 U/L — SIGNIFICANT CHANGE UP
BASOPHILS # BLD AUTO: 0.04 K/UL — SIGNIFICANT CHANGE UP (ref 0–0.2)
BASOPHILS NFR BLD AUTO: 0.6 % — SIGNIFICANT CHANGE UP (ref 0–2)
BILIRUB SERPL-MCNC: 1 MG/DL — SIGNIFICANT CHANGE UP (ref 0.4–2)
BUN SERPL-MCNC: 12.1 MG/DL — SIGNIFICANT CHANGE UP (ref 8–20)
CALCIUM SERPL-MCNC: 9.1 MG/DL — SIGNIFICANT CHANGE UP (ref 8.6–10.2)
CHLORIDE SERPL-SCNC: 104 MMOL/L — SIGNIFICANT CHANGE UP (ref 98–107)
CO2 SERPL-SCNC: 30 MMOL/L — HIGH (ref 22–29)
CREAT SERPL-MCNC: 0.93 MG/DL — SIGNIFICANT CHANGE UP (ref 0.5–1.3)
EOSINOPHIL # BLD AUTO: 0.06 K/UL — SIGNIFICANT CHANGE UP (ref 0–0.5)
EOSINOPHIL NFR BLD AUTO: 0.9 % — SIGNIFICANT CHANGE UP (ref 0–6)
GLUCOSE SERPL-MCNC: 126 MG/DL — HIGH (ref 70–99)
HCT VFR BLD CALC: 37.2 % — LOW (ref 39–50)
HGB BLD-MCNC: 12.2 G/DL — LOW (ref 13–17)
IMM GRANULOCYTES NFR BLD AUTO: 0.3 % — SIGNIFICANT CHANGE UP (ref 0–1.5)
LYMPHOCYTES # BLD AUTO: 0.74 K/UL — LOW (ref 1–3.3)
LYMPHOCYTES # BLD AUTO: 11.2 % — LOW (ref 13–44)
MAGNESIUM SERPL-MCNC: 2.1 MG/DL — SIGNIFICANT CHANGE UP (ref 1.6–2.6)
MCHC RBC-ENTMCNC: 30.4 PG — SIGNIFICANT CHANGE UP (ref 27–34)
MCHC RBC-ENTMCNC: 32.8 GM/DL — SIGNIFICANT CHANGE UP (ref 32–36)
MCV RBC AUTO: 92.8 FL — SIGNIFICANT CHANGE UP (ref 80–100)
MONOCYTES # BLD AUTO: 0.56 K/UL — SIGNIFICANT CHANGE UP (ref 0–0.9)
MONOCYTES NFR BLD AUTO: 8.5 % — SIGNIFICANT CHANGE UP (ref 2–14)
NEUTROPHILS # BLD AUTO: 5.17 K/UL — SIGNIFICANT CHANGE UP (ref 1.8–7.4)
NEUTROPHILS NFR BLD AUTO: 78.5 % — HIGH (ref 43–77)
NT-PROBNP SERPL-SCNC: 164 PG/ML — SIGNIFICANT CHANGE UP (ref 0–300)
PLATELET # BLD AUTO: 197 K/UL — SIGNIFICANT CHANGE UP (ref 150–400)
POTASSIUM SERPL-MCNC: 4.6 MMOL/L — SIGNIFICANT CHANGE UP (ref 3.5–5.3)
POTASSIUM SERPL-SCNC: 4.6 MMOL/L — SIGNIFICANT CHANGE UP (ref 3.5–5.3)
PROT SERPL-MCNC: 6.7 G/DL — SIGNIFICANT CHANGE UP (ref 6.6–8.7)
RBC # BLD: 4.01 M/UL — LOW (ref 4.2–5.8)
RBC # FLD: 13.2 % — SIGNIFICANT CHANGE UP (ref 10.3–14.5)
SODIUM SERPL-SCNC: 143 MMOL/L — SIGNIFICANT CHANGE UP (ref 135–145)
TROPONIN T SERPL-MCNC: <0.01 NG/ML — SIGNIFICANT CHANGE UP (ref 0–0.06)
WBC # BLD: 6.59 K/UL — SIGNIFICANT CHANGE UP (ref 3.8–10.5)
WBC # FLD AUTO: 6.59 K/UL — SIGNIFICANT CHANGE UP (ref 3.8–10.5)

## 2021-06-30 PROCEDURE — 99283 EMERGENCY DEPT VISIT LOW MDM: CPT

## 2021-06-30 PROCEDURE — 85025 COMPLETE CBC W/AUTO DIFF WBC: CPT

## 2021-06-30 PROCEDURE — 93005 ELECTROCARDIOGRAM TRACING: CPT

## 2021-06-30 PROCEDURE — 93010 ELECTROCARDIOGRAM REPORT: CPT

## 2021-06-30 PROCEDURE — 99284 EMERGENCY DEPT VISIT MOD MDM: CPT | Mod: 25

## 2021-06-30 PROCEDURE — 83735 ASSAY OF MAGNESIUM: CPT

## 2021-06-30 PROCEDURE — 83880 ASSAY OF NATRIURETIC PEPTIDE: CPT

## 2021-06-30 PROCEDURE — 36415 COLL VENOUS BLD VENIPUNCTURE: CPT

## 2021-06-30 PROCEDURE — 80053 COMPREHEN METABOLIC PANEL: CPT

## 2021-06-30 PROCEDURE — 71046 X-RAY EXAM CHEST 2 VIEWS: CPT | Mod: 26

## 2021-06-30 PROCEDURE — 71046 X-RAY EXAM CHEST 2 VIEWS: CPT

## 2021-06-30 PROCEDURE — 99285 EMERGENCY DEPT VISIT HI MDM: CPT

## 2021-06-30 PROCEDURE — 84484 ASSAY OF TROPONIN QUANT: CPT

## 2021-06-30 RX ORDER — ASPIRIN/CALCIUM CARB/MAGNESIUM 324 MG
162 TABLET ORAL ONCE
Refills: 0 | Status: COMPLETED | OUTPATIENT
Start: 2021-06-30 | End: 2021-06-30

## 2021-06-30 RX ADMIN — Medication 162 MILLIGRAM(S): at 12:09

## 2021-06-30 NOTE — CONSULT NOTE ADULT - ASSESSMENT
Pt is 69 y/o male with HLD, GERD, BPH, HTN, who presents to [facility]ED for chest discomfort. Pt states yesterday he spent prolonged period of time outside (30 minutes) in the heat. Around lunch time he ate an eggplant parmesan hero which he split with his wife. Afterwards, patient states that he felt left sided chest tightness, which was continuous, non-radiating with associated nausea. While he was exerting himself walking up and down the stairs, pt denies worsening symptoms with exertion, and some symptom relief with TUMS. Pt states that he when he went to the bank and put on a mask he felt some difficulty breathing and lightheadedness but it got better when his mask came off.  Pt also stated that when he was at home in the , his symptoms subsided. Pt checked his BP which was 136/78 HR 70s. Pt woke up lightheaded this morning and still had chest discomfort so he called his cardiologist who suggested he come to ED for evaluation. In ED, ECG-NSR, RBBB, TROP#1-negative, Xray-negative.      Chest Pain  -ECG-NSR, RBBB  -TROP#1-negative  -Xray-negative  -Echo 12/2020- EF 50-55%, mid and apical inferior septum, apical lateral segment, and apex are abnormal, normal RV size and fx., no evidence of pericardial  -NST 4/2021- EF >75%, medium sized, moderate defect in basal to mid inferior  wall that is fixed, suggestive of infarction  with minimal goldy-infarct ischemia. The basal inferior wall is akinetic, other walls are hyperdynamic.  -GI etiology of chest discomfort likely  -Discussed with patient avoid foods that worsen acid reflux symptoms  -Maintain PO fluid intake  -Follow up with outaptient cardiologist within 1-2 weeks      Assessment and recommendations are final when note is signed by the attending.

## 2021-06-30 NOTE — ED PROVIDER NOTE - CLINICAL SUMMARY MEDICAL DECISION MAKING FREE TEXT BOX
high risk chest pain for ACS. ekg unchanged from prior. will do serial troponins. [facility] cardiology consult placed

## 2021-06-30 NOTE — ED PROVIDER NOTE - NS_EDPROVIDERDISPOUSERTYPE_ED_A_ED
Pt reports no decline in condition since last visit.  Pt did report he has been using less Keppra, pt reports taking 0.5 tabs twice a day for 500mg total. Attending Attestation (For Attendings USE Only)...

## 2021-06-30 NOTE — ED PROVIDER NOTE - CARE PROVIDER_API CALL
David Quiroga)  Cardiovascular Disease  [address] [facility], [address] [place] [address]  Phone: [phone]  Fax: [phone]  Follow Up Time: 1-3 Days

## 2021-06-30 NOTE — ED PROVIDER NOTE - PROGRESS NOTE DETAILS
seen by cardiology team (attending Dr. Rojas) reports patient with history of similar symptoms. patient reporting symptoms intermittent since yesterday. recommending DC home with close f/u and return precautions. patient comfortable with dc planning

## 2021-06-30 NOTE — ED ADULT NURSE REASSESSMENT NOTE - NS ED NURSE REASSESS COMMENT FT1
Pt is sitting up on the stretcher, eating a sandwich, no distress noted. Pt was seen by Cardiology and is waiting for a DC note.

## 2021-06-30 NOTE — CONSULT NOTE ADULT - SUBJECTIVE AND OBJECTIVE BOX
[place] [facility]                                                               Office:  [address] [address]                                                              Telephone: [phone]. Fax:[phone]                                                                        CARDIOLOGY CONSULTATION NOTE                                                                                             Consult requested by:  Dr. Plascnecia  Reason for Consultation: Chest Pain  PMD: Dr. Levin  Primary Cardiology: Dr. Quiroga in [facility]  History obtained by: Patient and medical record   obtained: No    Chief complaint:    Patient is a 70y old  Male who presents with a chief complaint of chest discomfort      HPI: Pt is 69 y/o male with HLD, GERD, BPH, HTN, who presents to [facility]-ED for chest discomfort. Pt states yesterday he spent prolonged period of time outside (30 minutes) in the heat. Around lunch time he ate an eggplant parmesan hero which he split with his wife. Afterwards, patient states that he felt left sided chest tightness, which was continuous, non-radiating with associated nausea. While he was exerting himself walking up and down the stairs, pt denies worsening symptoms with exertion, and some symptom relief with TUMS. Pt states that he when he went to the bank and put on a mask he felt some difficulty breathing and lightheadedness but it got better when his mask came off.  Pt also stated that when he was at home in the , his symptoms subsided. Pt checked his BP which was 136/78 HR 70s. Pt woke up lightheaded this morning and still had chest discomfort so he called his cardiologist who suggested he come to ED for evaluation. In ED, ECG-NSR, RBBB, TROP#1-negative, Xray-negative.         REVIEW OF SYMPTOMS:     CONSTITUTIONAL: No fever, weight loss, or fatigue  ENMT:  No difficulty hearing, tinnitus, vertigo; No sinus or throat pain  NECK: No pain or stiffness  CARDIOVASCULAR: See HPI   RESPIRATORY: No Dyspnea on exertion, Shortness of breath, cough, wheezing  : No dysuria, no hematuria   GI: No dark color stool, no melena, no diarrhea, no constipation, no abdominal pain   NEURO: No headache, no dizziness, no slurred speech   MUSCULOSKELETAL: No joint pain or swelling; No muscle, back, or extremity pain  PSYCH: No agitation, no anxiety.    ALL OTHER REVIEW OF SYSTEMS ARE NEGATIVE.      PREVIOUS DIAGNOSTIC TESTING    ECHO FINDINGS: < from: TTE Echo Limited or F/U (12.24.20 @ 11:31) >  Summary:   1. Limited STAT echo bedside in cath lab to r/o pericardial effusion.   2. Left ventricular ejection fraction, by visual estimation, is 50 to 55%.   3. Mid and apical inferior septum, apical lateral segment, and apex are abnormal as described above.   4. Low normal global left ventricular systolic function.   5. Normal left ventricular internal cavity size.   6. Normal right ventricular size and function.   7. There is no evidence of pericardial effusion.      STRESS FINDINGS: < from: Nuclear Stress Test-Pharmacologic (04.12.21 @ 07:07) >  IMPRESSIONS:  * Review of raw data shows: Minor motion artifact.  * There is a medium sized, moderate defect in basal to mid  inferior  wall that is fixed, suggestive of infarction  with minimal goldy-infarct ischemia.  * The basal inferior wall is akinetic, other walls are  hyperdynamic. Overall LVEF is preserved, >75%.  * Refused to walk said he had exercise intolerance  * Chest Pain: No chest pain with administration of  Regadenoson.  * Symptom: dyspnea.  * HR Response: Appropriate.  * BP Response: Appropriate.  * Heart Rhythm: Normal Sinus Rhythm.  * ECG Abnormalities: There were no diagnostic changes.  * Arrhythmia: None.        CATHETERIZATION FINDINGS: < from: Cardiac Cath Lab - Adult (12.24.20 @ 12:06) >  CORONARY VESSELS: The coronary circulation is right dominant.  LM:   --  LM: Normal.  LAD:   --  Proximal LAD: There was a 100 % stenosis. The lesion was  associated with a moderate filling defect consistent with thrombus.  CX:   --  Circumflex: Normal.  RCA:   --  Proximal RCA: There was a 0 % stenosis in-stent.  COMPLICATIONS: No complications occurred during the cath lab visit.  DIAGNOSTIC IMPRESSIONS: Acute stent thrombosis of LAD. Unclear etiology. ?  of distal edge dissection but none seen on prior angio/IVUS imaging.  PCI performed with 1 SOBEIDA overlapping previous LAD stent.  DIAGNOSTIC RECOMMENDATIONS: Aspirin and Brilinta.  Patient will be provided with one month supply. After this, will change to  plavix due to monitary reasons. For changing, needs to overlap plavix with  brilinta for 5 days.  INTERVENTIONAL IMPRESSIONS: Acute stent thrombosis of LAD. Unclear  etiology. ? of distal edge dissection but none seen on prior angio/IVUS  imaging.  PCI performed with 1 SOBEIDA overlapping previous LAD stent.  INTERVENTIONAL RECOMMENDATIONS: Aspirin and Brilinta.  Patient will be provided with one month supply. After this, will change to  plavix due to monitary reasons. For changing, needs to overlap plavix with  brilinta for 5 days.      ALLERGIES: Allergies    azithromycin (Other)  crabs (Hives)  Flomax (Faint)  penicillin (Hives)    Intolerances      PAST MEDICAL HISTORY  HTN (hypertension)    BPH (Benign Prostatic Hyperplasia)    GERD (gastroesophageal reflux disease)    Hypercholesterolemia        PAST SURGICAL HISTORY  S/P cholecystectomy    S/P inguinal hernia repair        FAMILY HISTORY:    No pertinent family history in first degree relatives        SOCIAL HISTORY:  Denies smoking/alcohol/drugs      HOME MEDICATIONS:    alfuzosin 10 mg oral tablet, extended release: 1 tab(s) orally once a day (10 Apr 2021 09:05)  atorvastatin 80 mg oral tablet: 0.5 tab(s) orally once a day (at bedtime) (10 Apr 2021 09:05)  metoprolol succinate 50 mg oral tablet, extended release: 1 tab(s) orally once a day (10 Apr 2021 09:05)  pantoprazole 40 mg oral delayed release tablet: 1 tab(s) orally once a day (10 Apr 2021 09:05)      Vital Signs Last 24 Hrs  T(C): 36.6 (30 Jun 2021 10:50), Max: 36.6 (30 Jun 2021 10:33)  T(F): 97.8 (30 Jun 2021 10:50), Max: 97.9 (30 Jun 2021 10:33)  HR: 80 (30 Jun 2021 10:50) (77 - 80)  BP: 137/77 (30 Jun 2021 10:50) (137/77 - 155/80)  RR: 20 (30 Jun 2021 10:50) (18 - 20)  SpO2: 97% (30 Jun 2021 10:50) (97% - 98%)      PHYSICAL EXAM:  Constitutional: Comfortable . No acute distress.   HEENT: Atraumatic and normocephalic , neck is supple . no JVD. No carotid bruit. PEERL   CNS: A&Ox3. No focal deficits. EOMI.    Lymph Nodes: Cervical : Not palpable.  Respiratory: CTAB  Cardiovascular: S1S2 RRR. No murmur/rubs or gallop.  Gastrointestinal: Soft non-tender and non distended . +Bowel sounds. negative Herbert's sign.  Extremities: No edema.   Psychiatric: Calm . no agitation.  Skin: No skin rash/ulcers visualized to face, hands or feet.    Intake and output:     LABS:            ;p-BNP=        INTERPRETATION OF TELEMETRY: NSR HR @ 60-70s  ECG:  NSR, RBBB    RADIOLOGY & ADDITIONAL STUDIES:      X-ray:  < from: Xray Chest 2 Views PA/Lat (06.30.21 @ 11:56) >  IMPRESSION:  No acute radiographic findings and no change

## 2021-06-30 NOTE — ED PROVIDER NOTE - PATIENT PORTAL LINK FT
You can access the FollowMyHealth Patient Portal offered by [facility] by registering at the following website: [email or web address]. By joining [employer]’s FollowMyHealth portal, you will also be able to view your health information using other applications (apps) compatible with our system.

## 2021-06-30 NOTE — ED PROVIDER NOTE - OBJECTIVE STATEMENT
72yo male w/PMH CAD s/p SOBEIDA x3 (dx cath 12/23/20 w/rotoblade, RCA SOBEIDA x1, HTN, HLD, GERD, BPH here with lightheaded sensation and chest tightness. lightheaded since yesterday intermittent episodes . today with chest tightness. Called cardiology office and recommended to come to ED (cardio Dr. Quiroga). Former smoker. Last cath 7 months ago with stent placement. 72yo male w/PMH CAD s/p SOBEIDA x3 (dx cath 12/23/20 w/rotoblade, RCA SOBEIDA x1, HTN, HLD, GERD, BPH here with lightheaded sensation and chest tightness. lightheaded and chest tightness since yesterday intermittent episodes. Called cardiology office and recommended to come to ED (cardio Dr. Quiroga). Former smoker. Last cath 7 months ago with stent placement.

## 2021-06-30 NOTE — ED ADULT TRIAGE NOTE - CHIEF COMPLAINT QUOTE
Patient advised by his cardiologist to come to ED for chest tightness and dizziness that started yesterday.

## 2021-06-30 NOTE — ED ADULT NURSE NOTE - OBJECTIVE STATEMENT
Pt is here with c/o chest tightness and lightheadedness since yesterday. Also c/o slight nausea.   P/s it all started yesterday while running errands.  Pt has a long cardiac hx and was told by his cardiologist to come to ED. Pt is a/ox3,skin W&D color pink.  Respeven/unlabored, no  distress noted.

## 2021-06-30 NOTE — CONSULT NOTE ADULT - ATTENDING COMMENTS
70M history as listed with chronic angina on nitropatch still with daily chest pain was outside yesterday during hot weather and had food intake exacerbated acid reflux, lab work and EKG within baseline, no evidence of ACS, patient reports adherent to his meds but also take his brother sublingual nitrate at time with relief, stable for discharge home to continue antianginal, follow up at [facility] with Dr. Bonilla. Advised if any new or worsening symptoms to return to the ER.         Norman Rojas DO, [facility]  Faculty Non-Invasive Cardiologist  [phone]

## 2021-08-09 ENCOUNTER — NON-APPOINTMENT (OUTPATIENT)
Age: 71
End: 2021-08-09

## 2021-08-10 ENCOUNTER — APPOINTMENT (OUTPATIENT)
Dept: UROLOGY | Facility: CLINIC | Age: 71
End: 2021-08-10
Payer: MEDICARE

## 2021-08-10 VITALS — DIASTOLIC BLOOD PRESSURE: 74 MMHG | SYSTOLIC BLOOD PRESSURE: 152 MMHG | HEART RATE: 74 BPM | TEMPERATURE: 98 F

## 2021-08-10 PROCEDURE — 51798 US URINE CAPACITY MEASURE: CPT

## 2021-08-10 PROCEDURE — 99214 OFFICE O/P EST MOD 30 MIN: CPT | Mod: 25

## 2021-08-18 NOTE — END OF VISIT
[Time Spent: ___ minutes] : I have spent [unfilled] minutes of time on the encounter.
[Time Spent: ___ minutes] : I have spent [unfilled] minutes of time on the encounter.
no

## 2021-08-18 NOTE — LETTER BODY
[Dear  ___] : Dear  [unfilled], [Courtesy Letter:] : I had the pleasure of seeing your patient, [unfilled], in my office today. [Please see my note below.] : Please see my note below. [Sincerely,] : Sincerely, [FreeTextEntry3] : Roman Sanabria MD\par  of Urology\par [facility] [employer]\par \par Offices:\par [facility] @\par [address], [place] [address]\par and\par [address] [facility], [place] [address], [address]\par and\par [address] [address]\par \par TEL: [phone]\par FAX: [phone]

## 2021-08-18 NOTE — HISTORY OF PRESENT ILLNESS
[FreeTextEntry1] : 71 yo male presents for follow up. \par On Alfuzosin, better flow, urinates every 2-3 hours or so during the day. Nocturia of 2-3 x. \par No hesitancy and sense of incomplete emptying. Still has urinary incontinence. \par Denies dysuria, hematuria, lower abdominal or flank pain, nausea, vomiting, fever, chills or rigors. \par Had repeat PSA: 6.7; Free%: 39(7/27/21). \par \par Took Alfuzosin for 25 days, had better flow, but more frequency. No no side effects. \par Stopped. Now weak stream back to urination like before. \par \par Initially seen for Elevated PSA. \par Has history of Elevated PSA, status post negative prostate biopsy and has had MRI Prostate. \par Was following with Dr Crum and Socorro. Had seen me in the past at [facility]. \par Denied any recent unintentional weight loss, night sweats and new bone or back pain.\par Family history of Prostate cancer- no. \par Reported variable stream, urinates every 2-3 hours or so during the day. Nocturia of 2-3 x. \par Endorsed off and on hesitancy and sense of incomplete emptying. Has occasional urinary incontinence at night. \par Denied dysuria, hematuria, lower abdominal or flank pain, fever, chills or rigors.\par Has Erectile dysfunction. Not sexually active.\par \par With Flomax- passed out. Was prescribed Alfuzosin, had not tried. Has Cardiac issues. \par \par

## 2021-08-18 NOTE — PHYSICAL EXAM
[Normal Appearance] : normal appearance [General Appearance - In No Acute Distress] : no acute distress [Abdomen Tenderness] : non-tender [Abdomen Soft] : soft [Skin Color & Pigmentation] : normal skin color and pigmentation [FreeTextEntry1] : normal peripheral circulation  [Oriented To Time, Place, And Person] : oriented to person, place, and time [] : no respiratory distress

## 2021-08-18 NOTE — LETTER BODY
[Dear  ___] : Dear  [unfilled], [Courtesy Letter:] : I had the pleasure of seeing your patient, [unfilled], in my office today. [Please see my note below.] : Please see my note below. [Sincerely,] : Sincerely, [FreeTextEntry3] : Roman Sanabria MD\par  of Urology\par [facility] [employer]\par \par Offices:\par [facility]rology @\par [address], [place] [address]\par and\par [address] [facility], [place] [address], [address]\par and\par [address] [address]\par \par TEL: [phone]\par FAX: [phone]

## 2021-08-18 NOTE — HISTORY OF PRESENT ILLNESS
[FreeTextEntry1] : 71 yo male presents for follow up. \par On Alfuzosin, better flow, urinates every 2-3 hours or so during the day. Nocturia of 2-3 x. \par No hesitancy and sense of incomplete emptying. Still has urinary incontinence. \par Denies dysuria, hematuria, lower abdominal or flank pain, nausea, vomiting, fever, chills or rigors. \par Had repeat PSA: 6.7; Free%: 39(7/27/21). \par \par Took Alfuzosin for 25 days, had better flow, but more frequency. No no side effects. \par Stopped. Now weak stream back to urination like before. \par \par Initially seen for Elevated PSA. \par Has history of Elevated PSA, status post negative prostate biopsy and has had MRI Prostate. \par Was following with Dr Crum and [place]. Had seen me in the past at [facility]. \par Denied any recent unintentional weight loss, night sweats and new bone or back pain.\par Family history of Prostate cancer- no. \par Reported variable stream, urinates every 2-3 hours or so during the day. Nocturia of 2-3 x. \par Endorsed off and on hesitancy and sense of incomplete emptying. Has occasional urinary incontinence at night. \par Denied dysuria, hematuria, lower abdominal or flank pain, fever, chills or rigors.\par Has Erectile dysfunction. Not sexually active.\par \par With Flomax- passed out. Was prescribed Alfuzosin, had not tried. Has Cardiac issues. \par \par

## 2021-08-18 NOTE — ASSESSMENT
[FreeTextEntry1] : Benign Prostatic Hyperplasia:\par Discussed treatment options. Will continue Alfuzosin. \par \par Urinary incontinence:\par Discussed prescribing Vesicare. Pt agreeable. Explained side effects- dryness of eyes and mouth, thirst, blurred vision, dyspepsia, nausea, fatigue, somnolence, confusion, headache, constipation and difficulty urinating. \par \par Elevated PSA:\par PSA stable. \par \par Return to office in 3 months or sooner if any issues: will do Uroflo/PVR.

## 2021-09-14 ENCOUNTER — APPOINTMENT (OUTPATIENT)
Dept: UROLOGY | Facility: CLINIC | Age: 71
End: 2021-09-14

## 2021-09-25 ENCOUNTER — EMERGENCY (EMERGENCY)
Facility: HOSPITAL | Age: 71
LOS: 1 days | Discharge: HOME | End: 2021-09-25
Attending: STUDENT IN AN ORGANIZED HEALTH CARE EDUCATION/TRAINING PROGRAM
Payer: COMMERCIAL

## 2021-09-25 VITALS
SYSTOLIC BLOOD PRESSURE: 119 MMHG | RESPIRATION RATE: 17 BRPM | WEIGHT: 167.99 LBS | HEIGHT: 70 IN | TEMPERATURE: 99 F | OXYGEN SATURATION: 96 % | HEART RATE: 90 BPM | DIASTOLIC BLOOD PRESSURE: 76 MMHG

## 2021-09-25 LAB
ALBUMIN SERPL ELPH-MCNC: 4.6 G/DL — SIGNIFICANT CHANGE UP (ref 3.3–5.2)
ALP SERPL-CCNC: 91 U/L — SIGNIFICANT CHANGE UP (ref 40–120)
ALT FLD-CCNC: 17 U/L — SIGNIFICANT CHANGE UP
ANION GAP SERPL CALC-SCNC: 12 MMOL/L — SIGNIFICANT CHANGE UP (ref 5–17)
APPEARANCE UR: CLEAR — SIGNIFICANT CHANGE UP
AST SERPL-CCNC: 18 U/L — SIGNIFICANT CHANGE UP
BACTERIA # UR AUTO: ABNORMAL
BASOPHILS # BLD AUTO: 0.04 K/UL — SIGNIFICANT CHANGE UP (ref 0–0.2)
BASOPHILS NFR BLD AUTO: 0.4 % — SIGNIFICANT CHANGE UP (ref 0–2)
BILIRUB SERPL-MCNC: 1.1 MG/DL — SIGNIFICANT CHANGE UP (ref 0.4–2)
BILIRUB UR-MCNC: NEGATIVE — SIGNIFICANT CHANGE UP
BUN SERPL-MCNC: 15 MG/DL — SIGNIFICANT CHANGE UP (ref 8–20)
CALCIUM SERPL-MCNC: 9.5 MG/DL — SIGNIFICANT CHANGE UP (ref 8.6–10.2)
CHLORIDE SERPL-SCNC: 101 MMOL/L — SIGNIFICANT CHANGE UP (ref 98–107)
CO2 SERPL-SCNC: 29 MMOL/L — SIGNIFICANT CHANGE UP (ref 22–29)
COLOR SPEC: YELLOW — SIGNIFICANT CHANGE UP
CREAT SERPL-MCNC: 0.82 MG/DL — SIGNIFICANT CHANGE UP (ref 0.5–1.3)
DIFF PNL FLD: ABNORMAL
EOSINOPHIL # BLD AUTO: 0.08 K/UL — SIGNIFICANT CHANGE UP (ref 0–0.5)
EOSINOPHIL NFR BLD AUTO: 0.9 % — SIGNIFICANT CHANGE UP (ref 0–6)
EPI CELLS # UR: NEGATIVE — SIGNIFICANT CHANGE UP
GLUCOSE SERPL-MCNC: 112 MG/DL — HIGH (ref 70–99)
GLUCOSE UR QL: NEGATIVE MG/DL — SIGNIFICANT CHANGE UP
HCT VFR BLD CALC: 38.7 % — LOW (ref 39–50)
HGB BLD-MCNC: 12.7 G/DL — LOW (ref 13–17)
IMM GRANULOCYTES NFR BLD AUTO: 0.3 % — SIGNIFICANT CHANGE UP (ref 0–1.5)
KETONES UR-MCNC: NEGATIVE — SIGNIFICANT CHANGE UP
LEUKOCYTE ESTERASE UR-ACNC: ABNORMAL
LIDOCAIN IGE QN: 15 U/L — LOW (ref 22–51)
LYMPHOCYTES # BLD AUTO: 1.1 K/UL — SIGNIFICANT CHANGE UP (ref 1–3.3)
LYMPHOCYTES # BLD AUTO: 12.3 % — LOW (ref 13–44)
MCHC RBC-ENTMCNC: 30 PG — SIGNIFICANT CHANGE UP (ref 27–34)
MCHC RBC-ENTMCNC: 32.8 GM/DL — SIGNIFICANT CHANGE UP (ref 32–36)
MCV RBC AUTO: 91.5 FL — SIGNIFICANT CHANGE UP (ref 80–100)
MONOCYTES # BLD AUTO: 0.89 K/UL — SIGNIFICANT CHANGE UP (ref 0–0.9)
MONOCYTES NFR BLD AUTO: 10 % — SIGNIFICANT CHANGE UP (ref 2–14)
NEUTROPHILS # BLD AUTO: 6.79 K/UL — SIGNIFICANT CHANGE UP (ref 1.8–7.4)
NEUTROPHILS NFR BLD AUTO: 76.1 % — SIGNIFICANT CHANGE UP (ref 43–77)
NITRITE UR-MCNC: NEGATIVE — SIGNIFICANT CHANGE UP
PH UR: 6 — SIGNIFICANT CHANGE UP (ref 5–8)
PLATELET # BLD AUTO: 258 K/UL — SIGNIFICANT CHANGE UP (ref 150–400)
POTASSIUM SERPL-MCNC: 4.3 MMOL/L — SIGNIFICANT CHANGE UP (ref 3.5–5.3)
POTASSIUM SERPL-SCNC: 4.3 MMOL/L — SIGNIFICANT CHANGE UP (ref 3.5–5.3)
PROT SERPL-MCNC: 7.6 G/DL — SIGNIFICANT CHANGE UP (ref 6.6–8.7)
PROT UR-MCNC: 15 MG/DL
RBC # BLD: 4.23 M/UL — SIGNIFICANT CHANGE UP (ref 4.2–5.8)
RBC # FLD: 13.1 % — SIGNIFICANT CHANGE UP (ref 10.3–14.5)
RBC CASTS # UR COMP ASSIST: SIGNIFICANT CHANGE UP /HPF (ref 0–4)
SODIUM SERPL-SCNC: 142 MMOL/L — SIGNIFICANT CHANGE UP (ref 135–145)
SP GR SPEC: 1.01 — SIGNIFICANT CHANGE UP (ref 1.01–1.02)
UROBILINOGEN FLD QL: NEGATIVE MG/DL — SIGNIFICANT CHANGE UP
WBC # BLD: 8.93 K/UL — SIGNIFICANT CHANGE UP (ref 3.8–10.5)
WBC # FLD AUTO: 8.93 K/UL — SIGNIFICANT CHANGE UP (ref 3.8–10.5)
WBC UR QL: SIGNIFICANT CHANGE UP

## 2021-09-25 PROCEDURE — 85025 COMPLETE CBC W/AUTO DIFF WBC: CPT

## 2021-09-25 PROCEDURE — 99285 EMERGENCY DEPT VISIT HI MDM: CPT

## 2021-09-25 PROCEDURE — 74177 CT ABD & PELVIS W/CONTRAST: CPT | Mod: MD

## 2021-09-25 PROCEDURE — 74177 CT ABD & PELVIS W/CONTRAST: CPT | Mod: 26,MD

## 2021-09-25 PROCEDURE — 99284 EMERGENCY DEPT VISIT MOD MDM: CPT | Mod: 25

## 2021-09-25 PROCEDURE — 81001 URINALYSIS AUTO W/SCOPE: CPT

## 2021-09-25 PROCEDURE — 36415 COLL VENOUS BLD VENIPUNCTURE: CPT

## 2021-09-25 PROCEDURE — 80053 COMPREHEN METABOLIC PANEL: CPT

## 2021-09-25 PROCEDURE — 83690 ASSAY OF LIPASE: CPT

## 2021-09-25 RX ORDER — SODIUM CHLORIDE 9 MG/ML
1000 INJECTION INTRAMUSCULAR; INTRAVENOUS; SUBCUTANEOUS ONCE
Refills: 0 | Status: COMPLETED | OUTPATIENT
Start: 2021-09-25 | End: 2021-09-25

## 2021-09-25 RX ADMIN — SODIUM CHLORIDE 1000 MILLILITER(S): 9 INJECTION INTRAMUSCULAR; INTRAVENOUS; SUBCUTANEOUS at 15:31

## 2021-09-25 NOTE — ED STATDOCS - NSFOLLOWUPINSTRUCTIONS_ED_ALL_ED_FT
Constipation    Constipation is when a person has fewer than three bowel movements a week, has difficulty having a bowel movement, or has stools that are dry, hard, or larger than normal. Other symptoms can include abdominal pain or bloating. As people grow older, constipation is more common. A low-fiber diet, not taking in enough fluids, and taking certain medicines, including opioid painkillers, may make constipation worse. Treatment varies but may include dietary modifications (more fiber-rich foods), lifestyle modifications, and possible medications.     SEEK IMMEDIATE MEDICAL CARE IF YOU HAVE ANY OF THE FOLLOWING SYMPTOMS: bright red blood in your stool, constipation for longer than 4 days, abdominal or rectal pain, unexplained weight loss, or inability to pass gas.     Urinary Retention    Urinary retention is the inability to completely empty your bladder. This is a common problem in older men, especially with enlarged prostates. If you are sent home with a sharma catheter and a drainage system make sure to keep the drainage bag emptied and lower than your catheter. Keep the sharma catheter in until you follow up with a urologist.    SEEK IMMEDIATE MEDICAL CARE IF YOU DEVELOP THE FOLLOWING SYMPTOMS: the catheter stops draining urine, the catheter falls out, abdominal pain, nausea/vomiting, or chills/fever.

## 2021-09-25 NOTE — ED STATDOCS - CLINICAL SUMMARY MEDICAL DECISION MAKING FREE TEXT BOX
no BM x 4 days. also with mild dysuria today. disimpacted bedside with hard stool in vault. still unable to have BM. will get labs, CT to r/o obstruction

## 2021-09-25 NOTE — ED STATDOCS - PROGRESS NOTE DETAILS
Pts with urinary retention. Straight cath performed with >600cc drained. Pt reports complete relief of presenting symptoms. Pt states that he feels "brand new" and has no abdominal pain at all. Pts CT scan states that choledocholithiasis cannot be excluded. Pt has no RUQ pain, LFTs wnl. Pt states that he would like to go home and has his own Gastroenterologist to follow up with. Pt stable for d/c with GI/urology follow up. Pt given all labs/CT scan results and understands follow up recommendations.

## 2021-09-25 NOTE — ED STATDOCS - CARE PROVIDERS DIRECT ADDRESSES
[email or web address]

## 2021-09-25 NOTE — ED STATDOCS - ATTENDING CONTRIBUTION TO CARE
I, Maria C Plascencia, performed a face to face bedside interview with this patient regarding history of present illness, review of symptoms and relevant past medical, social and family history.  I completed an independent physical examination. Medical decision making, follow-up on ordered tests (ie labs, radiologic studies) and re-evaluation of the patient's status has been communicated to the ACP.  Disposition of the patient will be based on test outcome and response to ED interventions.

## 2021-09-25 NOTE — ED STATDOCS - NS ED MD EM SELECTION
[id number] Comprehensive
You can access the FollowMyHealth Patient Portal offered by [facility] by registering at the following website: [email or web address]. By joining [employer]’s FollowMyHealth portal, you will also be able to view your health information using other applications (apps) compatible with our system.

## 2021-09-25 NOTE — ED STATDOCS - PHYSICAL EXAMINATION
Vital Signs per nursing documentation  Gen: well appearing, no acute distress  HEENT: NCAT, MMM  Cardiac: regular rate rhythm, normal S1S2  Chest: clear to auscultation bilateral, no wheezes or crackles  Abdomen: soft, non tender, non distended  Rectal: Hard stool.   Extremity: no gross deformity, good perfusion  Skin: no rash  Neuro: nonfocal neuro exam, gait steady

## 2021-09-25 NOTE — ED STATDOCS - PATIENT PORTAL LINK FT
You can access the FollowMyHealth Patient Portal offered by [facility] by registering at the following website: [email or web address]. By joining [employer]’s FollowMyHealth portal, you will also be able to view your health information using other applications (apps) compatible with our system.

## 2021-09-25 NOTE — ED STATDOCS - OBJECTIVE STATEMENT
69 Y/O Male with PMHx. of HTN, CAD w/ stents, BPH, GERD, hypercholesteremia, and hemorrhoids presents to ED c/o persistent constipation x4 days. PT states he has not had a BM since last Tuesday and he usually has daily BM. PT has tried Miralax and magnesium citrate w/o relief. PT admits to passing gas and denies surgical history, fever, chills, vomiting. 69 Y/O Male with PMHx. of HTN, CAD w/ stents, BPH, GERD, hypercholesteremia, and hemorrhoids presents to ED c/o persistent constipation x4 days. PT states he has not had a BM since last Tuesday and he usually has daily BM. PT has tried Miralax and magnesium citrate w/o relief. PT admits to passing gas and denies surgical history, fever, chills, vomiting. PSH of cholecystectomy and hernia repair

## 2021-09-25 NOTE — ED ADULT TRIAGE NOTE - CHIEF COMPLAINT QUOTE
Patient states that he has not had a BM in 4 days. Pt denies any abdominal pain at this time. Pt states that he took mag citrate OTC without any effects. Pt also states that he is having a slight burning with urination

## 2021-09-25 NOTE — ED STATDOCS - CARE PROVIDER_API CALL
John Yadav  UROLOGY  [address] [address] [place] [address]  Phone: [phone]  Fax: [phone]  Follow Up Time:     Daren Case)  Surgery  [address] [place] [address], [place] [address]  Phone: [phone]  Fax: [phone]  Follow Up Time:

## 2021-09-25 NOTE — ED STATDOCS - NS ED ROS FT
ROS:  GEN: (-) fevers/chills  NECK: (-) stiffness, (-) swelling  RESP: (-) shortness of breath, (-) cough  CV: (-) chest pain, (-) palpitations  GI: (-) nausea, (-) vomiting, (-) pain, (+) constipation, (-) diarrhea  : (-) hematuria, (-) dysuria  EXT: (-) edema  NEURO: (-) weakness, (-) headache, (-) dizziness, (-) syncope  MSK: (-) muscle pain

## 2021-09-26 ENCOUNTER — EMERGENCY (EMERGENCY)
Facility: HOSPITAL | Age: 71
LOS: 1 days | Discharge: HOME | End: 2021-09-26
Attending: STUDENT IN AN ORGANIZED HEALTH CARE EDUCATION/TRAINING PROGRAM
Payer: COMMERCIAL

## 2021-09-26 VITALS
WEIGHT: 179.9 LBS | RESPIRATION RATE: 16 BRPM | SYSTOLIC BLOOD PRESSURE: 125 MMHG | HEART RATE: 90 BPM | TEMPERATURE: 99 F | OXYGEN SATURATION: 100 % | HEIGHT: 70 IN | DIASTOLIC BLOOD PRESSURE: 74 MMHG

## 2021-09-26 VITALS
HEART RATE: 100 BPM | RESPIRATION RATE: 18 BRPM | OXYGEN SATURATION: 98 % | TEMPERATURE: 99 F | WEIGHT: 167.99 LBS | HEIGHT: 70 IN

## 2021-09-26 PROCEDURE — 99283 EMERGENCY DEPT VISIT LOW MDM: CPT | Mod: 25

## 2021-09-26 PROCEDURE — 99282 EMERGENCY DEPT VISIT SF MDM: CPT

## 2021-09-26 PROCEDURE — 99284 EMERGENCY DEPT VISIT MOD MDM: CPT

## 2021-09-26 PROCEDURE — 51702 INSERT TEMP BLADDER CATH: CPT

## 2021-09-26 PROCEDURE — 99284 EMERGENCY DEPT VISIT MOD MDM: CPT | Mod: 25

## 2021-09-26 NOTE — ED ADULT TRIAGE NOTE - CHIEF COMPLAINT QUOTE
pt had catheter removed yesterday has not been able to urinate since sharma was taken out. tried to toke flomax at home per recommendation of home MD CORTEZ stated go to the hospital to have catheter placed back in and will follow up out pt with catheter

## 2021-09-26 NOTE — ED PROVIDER NOTE - CLINICAL SUMMARY MEDICAL DECISION MAKING FREE TEXT BOX
Pt with mild gross hematuria.  urine is wine colored.  no blood clots.  sharma draining. pT reassured and instructed to f/up with urology.

## 2021-09-26 NOTE — ED PROVIDER NOTE - NSFOLLOWUPINSTRUCTIONS_ED_ALL_ED_FT
Urinary Retention    Urinary retention is the inability to completely empty your bladder. This is a common problem in older men, especially with enlarged prostates. If you are sent home with a sharma catheter and a drainage system make sure to keep the drainage bag emptied and lower than your catheter. Keep the sharma catheter in until you follow up with a urologist.    SEEK IMMEDIATE MEDICAL CARE IF YOU DEVELOP THE FOLLOWING SYMPTOMS: the catheter stops draining urine, the catheter falls out, abdominal pain, nausea/vomiting, or chills/fever.

## 2021-09-26 NOTE — ED PROVIDER NOTE - PATIENT PORTAL LINK FT
You can access the FollowMyHealth Patient Portal offered by [facility] by registering at the following website: [email or web address]. By joining [facility]’s FollowMyHealth portal, you will also be able to view your health information using other applications (apps) compatible with our system.

## 2021-09-26 NOTE — ED PROVIDER NOTE - OBJECTIVE STATEMENT
Pt is a 69 yo M here for urinary retention. Pt states that he was here yesterday for urinary retention.  Pt had sharma placed and removed in ER.  Pt states that this morning he woke up unable to urinate. Pt called urology and was told to take some medication, wait 6 h and then if he was unable to urinate come to the ER for a sharma. no bleeding. no n/v. +abd pain.

## 2021-09-26 NOTE — ED ADULT NURSE REASSESSMENT NOTE - NS ED NURSE REASSESS COMMENT FT1
pt Sharma placed MD at bedside, aseptic technique maintained, met resistance on first attempt stopped blood noted, MD Fried placed sharma aseptic technique maintained,   pt comfortable at this time agreeable to plan of care. 1758 attempt to use straight silicone Sharma by RN placed with MD at bedside, aseptic technique maintained, met resistance on first attempt stopped blood noted MD notified, MD Fried placed coude sharma aseptic technique maintained,   pt comfortable at this time agreeable to plan of care. 1830 pt still waiting in waiting room expressed lustration at discomfort. attempt to use straight silicone Sharma by RN placed with MD MCCRARY at bedside, aseptic technique maintained, met resistance on first attempt stopped blood noted MD notified, MD Fried placed coude sharma aseptic technique maintained,   pt comfortable at this time agreeable to plan of care. 1830 pt still waiting in waiting room expressed fustration at discomfort. attempt to use straight silicone Sharma by RN placed with MD MCCRARY at bedside, aseptic technique maintained, met resistance on first attempt stopped blood noted MD notified, MD Fried placed coude sharma aseptic technique maintained,   pt comfortable at this time agreeable to plan of care.

## 2021-09-26 NOTE — ED PROVIDER NOTE - PHYSICAL EXAMINATION
Constitutional - well-developed; well nourished. Head - NCAT. Airway patent. Eyes - PERRL. CV - RRR. no murmur. no edema. Pulm - CTAB. Abd - soft, +suprapubic ttp and fullness. no rebound. no guarding. Neuro - A&Ox3. strength 5/5 x4. sensation intact x4. normal gait. Skin - No rash. MSK - normal ROM.

## 2021-09-26 NOTE — ED ADULT TRIAGE NOTE - CHIEF COMPLAINT QUOTE
Ambulatory s/p sharma insertion this morning, twice inserted, second time with a coude, seen by MD Fried in this ED, now reporting blood to drainage bad. Patient on Plavix.

## 2021-09-26 NOTE — ED PROVIDER NOTE - CARE PROVIDER_API CALL
Roman Sanabria)  Urology  [address], [address] [place] [address]  Phone: [phone]  Fax: [phone]  Follow Up Time: 1-3 Days

## 2021-09-26 NOTE — ED PROVIDER NOTE - CLINICAL SUMMARY MEDICAL DECISION MAKING FREE TEXT BOX
Goldstein placed and feeling much better.  will d/c to home with f/up to DR. Sanabria on Tuesday. instructed to return for worsening pain, bleeding, or any other concerns.

## 2021-09-26 NOTE — ED PROVIDER NOTE - PATIENT PORTAL LINK FT
You can access the FollowMyHealth Patient Portal offered by [facility] by registering at the following website: [email or web address]. By joining [facility]’s FollowMyHealth portal, you will also be able to view your health information using other applications (apps) compatible with our system.

## 2021-09-26 NOTE — ED PROVIDER NOTE - OBJECTIVE STATEMENT
Pt is a 69 yo M co hematuria.  Pt was here earlier today and had a sharma placed for urinary retention.  Sharma insertion did have some small blood at the time but he noticed more blood in the bag after leaving. no pain. no clots. no other complaints.

## 2021-09-26 NOTE — ED PROVIDER NOTE - CARE PLAN
Subjective:       Patient ID: Marcus Parisi is a 63 y.o. male.    Chief Complaint: Establish Care; Hypertension; and Hyperlipidemia    Patient presents is new patient to me but established patient formally of Dr. Yaquelni Pulido.  Patient is following up for conditions including hypertension, hyperlipidemia abnormal obstructive sleep apnea and prostate cancer.  Patient is seeing Urology currently for management of the prostate cancer.  Patient is on on any current active treatment plan for this but is under active surveillance.  Patient is tolerating his medication hypertension and hyperlipidemia well without effect.  Patient is needing refills these medications today.  Previously patient abnormal thyroid and he was started thyroid replacement medication.  Patient had ill side effects and did not.  Patient self discontinued these medications and recheck the labs later on.  The thyroid levels were normal at that time and patient has not been on thyroid medication in months.  The patient is obese with a BMI of 43.42.    Hypertension   This is a chronic problem. The current episode started more than 1 month ago. The problem has been waxing and waning since onset. The problem is controlled. Pertinent negatives include no anxiety, blurred vision, chest pain, headaches, neck pain, orthopnea, palpitations, peripheral edema, PND, shortness of breath or sweats. There are no associated agents to hypertension. Risk factors for coronary artery disease include stress, obesity, dyslipidemia and male gender. Past treatments include calcium channel blockers and ACE inhibitors. The current treatment provides significant improvement. Compliance problems include exercise and diet.  There is no history of angina or kidney disease. Identifiable causes of hypertension include a thyroid problem. There is no history of chronic renal disease.   Hyperlipidemia   This is a recurrent problem. The current episode started more than 1 month ago. The  1 problem is controlled. Recent lipid tests were reviewed and are variable. Exacerbating diseases include hypothyroidism and obesity. He has no history of chronic renal disease. Factors aggravating his hyperlipidemia include fatty foods. Pertinent negatives include no chest pain, focal weakness, leg pain, myalgias or shortness of breath. Current antihyperlipidemic treatment includes statins. The current treatment provides moderate improvement of lipids. Compliance problems include adherence to exercise and adherence to diet.  Risk factors for coronary artery disease include stress, dyslipidemia, male sex, hypertension and obesity.   Thyroid Problem   Presents for follow-up visit. Patient reports no anxiety, cold intolerance, constipation, depressed mood, diaphoresis, diarrhea, fatigue, heat intolerance, palpitations, weight gain or weight loss. The symptoms have been stable. His past medical history is significant for hyperlipidemia.   Ear Fullness    There is pain in both ears. This is a new problem. The current episode started 1 to 4 weeks ago. The problem occurs constantly. The problem has been gradually worsening. There has been no fever. The pain is at a severity of 0/10. The patient is experiencing no pain. Pertinent negatives include no abdominal pain, coughing, diarrhea, ear discharge, headaches, hearing loss, neck pain, rash, sore throat or vomiting. He has tried nothing for the symptoms. The treatment provided no relief.     Review of Systems   Constitutional: Negative for appetite change, chills, diaphoresis, fatigue, fever, unexpected weight change, weight gain and weight loss.        Obesity   HENT: Negative for congestion, ear discharge, ear pain, hearing loss, sore throat, trouble swallowing and voice change.         Ear fullness, decreased hearing   Eyes: Negative for blurred vision, photophobia, pain and visual disturbance.   Respiratory: Negative for cough, chest tightness and shortness of breath.     Cardiovascular: Negative for chest pain, palpitations, orthopnea, leg swelling and PND.        Hypertension, hyperlipidemia   Gastrointestinal: Negative for abdominal pain, constipation, diarrhea, nausea and vomiting.   Endocrine: Negative for cold intolerance and heat intolerance.        History of abnormal thyroid   Genitourinary: Negative for difficulty urinating, dysuria and flank pain.        Prostate cancer   Musculoskeletal: Negative for arthralgias, gait problem, myalgias and neck pain.   Skin: Negative for rash.   Allergic/Immunologic: Negative for immunocompromised state.   Neurological: Negative for dizziness, focal weakness, weakness and headaches.   Hematological: Negative for adenopathy.   Psychiatric/Behavioral: Negative for agitation, confusion, self-injury and suicidal ideas. The patient is not nervous/anxious.        Past Medical History:   Diagnosis Date    Allergy     Hyperlipidemia     Hypertension     Prostate cancer       Past Surgical History:   Procedure Laterality Date    carpel tunnel      bilaterally    CYSTOSCOPY N/A 1/28/2019    Procedure: CYSTOSCOPY;  Surgeon: Delmy Hernandez MD;  Location: [facility] OR;  Service: Urology;  Laterality: N/A;    TRANSRECTAL BIOPSY OF PROSTATE WITH ULTRASOUND GUIDANCE N/A 1/28/2019    Procedure: BIOPSY, PROSTATE, RECTAL APPROACH, WITH US GUIDANCE;  Surgeon: Delmy Hernandez MD;  Location: [facility] OR;  Service: Urology;  Laterality: N/A;       Family History   Problem Relation Age of Onset    Cancer Mother     Asthma Mother     Diabetes Mother     Heart disease Mother     Hypertension Mother     Hypertension Father     Crohn's disease Brother     Cancer Maternal Grandmother        Social History     Socioeconomic History    Marital status:      Spouse name: Not on file    Number of children: Not on file    Years of education: Not on file    Highest education level: Not on file   Occupational History    Not on file    Social Needs    Financial resource strain: Not on file    Food insecurity:     Worry: Not on file     Inability: Not on file    Transportation needs:     Medical: Not on file     Non-medical: Not on file   Tobacco Use    Smoking status: Former Smoker    Smokeless tobacco: Never Used    Tobacco comment: quit 40 yrs ago   Substance and Sexual Activity    Alcohol use: Yes     Comment: occasional    Drug use: No    Sexual activity: Yes     Partners: Female   Lifestyle    Physical activity:     Days per week: Not on file     Minutes per session: Not on file    Stress: Not at all   Relationships    Social connections:     Talks on phone: Not on file     Gets together: Not on file     Attends Quaker service: Not on file     Active member of club or organization: Not on file     Attends meetings of clubs or organizations: Not on file     Relationship status: Not on file   Other Topics Concern    Not on file   Social History Narrative    Not on file       Current Outpatient Medications   Medication Sig Dispense Refill    amlodipine-benazepril 5-20 mg (LOTREL) 5-20 mg per capsule Take 1 capsule by mouth once daily. 90 capsule 1    aspirin 81 MG Chew Take by mouth.      cyanocobalamin (VITAMIN B-12) 100 MCG tablet Take by mouth.      OMEGA-3 ACID ETHYL ESTERS ORAL Take by mouth.      simvastatin (ZOCOR) 40 MG tablet Take 1 tablet (40 mg total) by mouth once daily. 90 tablet 1    vitamin D (VITAMIN D3) 1000 units Tab Take 1,000 Units by mouth once daily.       No current facility-administered medications for this visit.      Facility-Administered Medications Ordered in Other Visits   Medication Dose Route Frequency Provider Last Rate Last Dose    gentamicin 80 mg/100ml NACL IVPB IVPB 80 mg  80 mg Intravenous On Call Procedure Delmy Hernandez MD           Review of patient's allergies indicates:  No Known Allergies  Objective:      Blood pressure 110/70, pulse 60, temperature 99.2 °F (37.3 °C),  "height 5' 9" (1.753 m), weight 133.4 kg (294 lb), SpO2 97 %. Body mass index is 43.42 kg/m².   Physical Exam   Constitutional: He is oriented to person, place, and time. He appears well-developed.   Obesity   HENT:   Head: Normocephalic and atraumatic.   Right Ear: A foreign body (wax) is present.   Left Ear: A foreign body (wax) is present.   Nose: Nose normal.   Mouth/Throat: Uvula is midline and oropharynx is clear and moist.   Eyes: Pupils are equal, round, and reactive to light. Conjunctivae, EOM and lids are normal.   Neck: Normal range of motion. Neck supple.   Cardiovascular: Normal rate, regular rhythm, S1 normal, S2 normal, normal heart sounds, intact distal pulses and normal pulses.   No murmur heard.  Pulmonary/Chest: Effort normal and breath sounds normal. No respiratory distress. He has no wheezes.   Abdominal: Soft. Bowel sounds are normal. There is no tenderness.   Musculoskeletal: Normal range of motion.   Lymphadenopathy:     He has no cervical adenopathy.   Neurological: He is alert and oriented to person, place, and time.   Skin: Skin is warm and dry. No rash noted.   Psychiatric: He has a normal mood and affect. His speech is normal and behavior is normal. Judgment and thought content normal.   Nursing note and vitals reviewed.          Assessment:       1. Essential (primary) hypertension    2. Dyslipidemia    3. Bilateral impacted cerumen    4. Abnormal thyroid blood test    5. Obstructive sleep apnea syndrome    6. Non-smoker    7. Prostate cancer    8. Morbid obesity        Plan:       Marcus was seen today for establish care, hypertension and hyperlipidemia.    Diagnoses and all orders for this visit:    Essential (primary) hypertension  -     amlodipine-benazepril 5-20 mg (LOTREL) 5-20 mg per capsule; Take 1 capsule by mouth once daily.  -     CBC auto differential; Future  -     Comprehensive metabolic panel; Future  -     Urinalysis; Future  -     CBC auto differential  -     " Comprehensive metabolic panel  -     Urinalysis   Stable.  Continue current medication.  Lifestyle changes: Reduce the amount of salt in your diet; Lose weight; Avoid drinking too much alcohol; Exercise at least 30 minutes per day most days of the week.  Continue current medications and home BP monitoring.     Dyslipidemia  -     simvastatin (ZOCOR) 40 MG tablet; Take 1 tablet (40 mg total) by mouth once daily.  -     Lipid panel; Future  -     Lipid panel  Limit red meat, butter, fried foods, cheese, and other foods that have a lot of saturated fat. Consume more: lean meats, fish, fruits, vegetables, whole grains, beans, lentils, and nuts.  Weight loss, and 30-45 min of cardiovascular exercise daily.     Bilateral impacted cerumen  Ceruminosis is noted in both ears.  Wax is removed by manual debridement with lighted current.  Wax removal bilaterally successfully.  No complications, pain, or patient discomfort noted.  Instructions for home care to prevent wax buildup are given.  TM intact bilaterally with no erythema noted in the ear canal.    Abnormal thyroid blood test  -     TSH; Future  -     TSH    Obstructive sleep apnea syndrome  Continue usage of sleeping equipment    Non-smoker  Continue smoking abstinence    Prostate cancer  Continue current with urologist management.  Patient states that he while in a biopsy soon.    Morbid obesity  The patient is asked to make an attempt to improve diet and exercise patterns to aid in medical management of this problem.         Labs ordered for review of current in chronic conditions.  Will notify of labs when received.  Plan to follow-up in 6 months for chronic conditions and management medication refills.   Principal Discharge DX:	Urinary retention

## 2021-09-28 ENCOUNTER — APPOINTMENT (OUTPATIENT)
Dept: UROLOGY | Facility: CLINIC | Age: 71
End: 2021-09-28
Payer: MEDICARE

## 2021-09-28 VITALS — TEMPERATURE: 97.9 F | SYSTOLIC BLOOD PRESSURE: 111 MMHG | HEART RATE: 85 BPM | DIASTOLIC BLOOD PRESSURE: 67 MMHG

## 2021-09-28 PROCEDURE — 51798 US URINE CAPACITY MEASURE: CPT

## 2021-09-28 PROCEDURE — 51702 INSERT TEMP BLADDER CATH: CPT

## 2021-09-28 PROCEDURE — 99214 OFFICE O/P EST MOD 30 MIN: CPT | Mod: 25

## 2021-09-28 NOTE — LETTER BODY
[Dear  ___] : Dear  [unfilled], [Courtesy Letter:] : I had the pleasure of seeing your patient, [unfilled], in my office today. [Sincerely,] : Sincerely, [FreeTextEntry3] : Roman Sanabria MD\par  of Urology\par [facility]\par \par Offices:\par [facility] @\par [address], [place] [address]\par and\par [address], [place] [address], [address]\par and\par [address] [address]\par \par TEL: [phone]\par FAX: [phone]

## 2021-09-28 NOTE — ASSESSMENT
[FreeTextEntry1] : Benign Prostatic Hyperplasia:\par Urinary retention:\par Gave trial of void, was not able to urinate. \par PVR: 345 ml\par Discussed treatment options. Will change Flomax to Rapaflo. Discussed similar side effect profile as Flomax with slightly increased incidence of retrograde ejaculation.  \par 16 Fr Coude tip Goldstein placed. \par Will start Ciprofloxacin. Discussed FDA black box warning. \par Continue Constipation management. \par \par Return to office in 1 week or sooner if any issues: trial of void.

## 2021-09-28 NOTE — HISTORY OF PRESENT ILLNESS
[FreeTextEntry1] : 71 yo male presents for Urinary retention. \par Called over weekend that he had Constipation had required straight catheterization and finally indwelling Goldstein catheter. Had difficult catheter placement and had hematuria, went back. Had gone to [facility]. \par Had recommended to stop Vesicare 10 mg, there was no difference. Taking Alfuzosin. \par Goldstein to leg bag draining clear urine. \par Constipation better. \par \par With Flomax had dizziness and fell in the past. \par \par PSA: 6.7; Free%: 39(7/27/21). \par \par Initially seen for Elevated PSA. \par Has history of Elevated PSA, status post negative prostate biopsy and has had MRI Prostate. \par Was following with Dr Crum and [place]. Had seen me in the past at [facility]. \par Denied any recent unintentional weight loss, night sweats and new bone or back pain.\par Family history of Prostate cancer- no. \par Reported variable stream, urinates every 2-3 hours or so during the day. Nocturia of 2-3 x. \par Endorsed off and on hesitancy and sense of incomplete emptying. Has occasional urinary incontinence at night. \par Denied dysuria, hematuria, lower abdominal or flank pain, fever, chills or rigors.\par Has Erectile dysfunction. Not sexually active.\par \par With Flomax- passed out. Was prescribed Alfuzosin, had not tried. Has Cardiac issues. \par \par

## 2021-10-01 NOTE — ED PROCEDURE NOTE - CPROC ED URIN DEVICE DETAIL1
MEDICARE WELLNESS VISIT NOTE      HISTORY OF PRESENT ILLNESS:   Mark Rivera presents for his Subsequent Annual Medicare Wellness Visit.   He has complaints or concerns which include rhinorrhea he is also in need of discussing his gout arthritis and eczema.    Rhinorrhea patient states he has intermittent rhinorrhea and occasional cough and sometimes he will bring up green mucus this happens 1 to 2 times a day for several months his nose is been whistling sleep.  Has persistent itchy watery eyes and sneezing has not tried anything over-the-counter no wheezing or shortness of breath  Gout patient has not been taking his gout medicine allopurinol appropriately he only takes it 3 times a week.  He states that he has not had gout in over a year but wants to keep it on this schedule as a preventative he drinks at least 4-5 large beers a day  Arthritis:  Patient is not on any prescription medicines for this he did have problems with his knees but now after knee replacement he is doing great  Eczema:  Patient has triamcinolone cream available needed the patient utilizes an over counter as of late as he ran out of the triamcinolone but would like a refill  Patient Care Team:  Sarahi Roy MD as PCP - General (Family Practice)  Ezra Adhikari MD as Cardiologist (Internal Medicine- Interventional Cardiology)  Rakesh Lazo DO as Orthopedic Surgeon (Orthopaedic Surgery- Adult Reconstructive Orthopaedic Surgery)  Sera Sosa MD (Internal Medicine- Interventional Cardiology)        Patient Active Problem List   Diagnosis   • Exudative age-related macular degeneration right eye   • Primary open-angle glaucoma both eyes   • Nonexudative age-related macular degeneration left eye   • Gout   • Hyperlipidemia   • Prostate cancer (CMS/[facility])   • DJD (degenerative joint disease)   • COAG (chronic open-angle glaucoma)   • Bilateral incipient cataracts   • Aortic valve stenosis   • CHF (congestive heart failure), NYHA class I  (CMS/HCC)   • S/P AVR- tissue   • Encounter for therapeutic drug monitoring   • Elevated blood sugar   • Status post total bilateral knee replacement 17   • Atrial fibrillation (CMS/HCC)   • Long term (current) use of anticoagulants [Z79.01]   • Paresthesia of right arm   • CIS (carcinoma in situ of prostate)   • Intrinsic eczema         Past Medical History:   Diagnosis Date   • Aortic stenosis, mild    • Atrial fibrillation (CMS/HCC)    • Chronic pain     R knee   • DJD (degenerative joint disease)     knees; and patellofemoral degenerative changes   • Gout    • Intrinsic eczema 3/8/2019   • Macular degeneration of both eyes    • Prostate cancer (CMS/HCC)    • S/P AVR- tissue 2015         Past Surgical History:   Procedure Laterality Date   • Aortic valve replacement     • Appendectomy      at age 33   • Cdl cath poss ptca poss stent  2015   • Colonoscopy  2017    radiation proctitis    Dr Mccann   • Colonoscopy diagnostic  2012   • Colonoscopy w/ polypectomy  2009   • Joint replacement Bilateral     knee   • Prostate gold seed implant  2010   • Tonsillectomy and adenoidectomy           Social History     Tobacco Use   • Smoking status: Former Smoker     Packs/day: 1.00     Years: 4.00     Pack years: 4.00     Types: Cigarettes     Quit date: 1969     Years since quittin.9   • Smokeless tobacco: Never Used   Substance Use Topics   • Alcohol use: Yes     Alcohol/week: 24.0 standard drinks     Types: 24 Cans of beer per week     Frequency: 4 or more times a week     Drinks per session: 1 or 2     Binge frequency: Never     Comment: 3-4 cans beers/day   • Drug use: No     Drug use:    Drug Use:    No              Family History   Problem Relation Age of Onset   • Arthritis Mother         RA   • Cancer Father         brain   • Patient is unaware of any medical problems Sister    • Patient is unaware of any medical problems Brother    • Patient is unaware of any medical  problems Sister    • Patient is unaware of any medical problems Daughter    • Patient is unaware of any medical problems Son    • Patient is unaware of any medical problems Maternal Grandmother    • Patient is unaware of any medical problems Maternal Grandfather    • Patient is unaware of any medical problems Paternal Grandmother    • Patient is unaware of any medical problems Paternal Grandfather    • Patient is unaware of any medical problems Daughter    • Patient is unaware of any medical problems Son        Current Outpatient Medications   Medication Sig Dispense Refill   • allopurinol (ZYLOPRIM) 300 MG tablet TAKE 1 TABLET BY MOUTH  DAILY (Patient taking differently: Take 300 mg by mouth daily. Is taking one tablet every other day) 90 tablet 0   • metoPROLOL succinate (TOPROL-XL) 25 MG 24 hr tablet TAKE ONE-HALF TABLET BY  MOUTH DAILY 45 tablet 0   • zoster vaccine recomb adjuvanted (SHINGRIX) 50 MCG/0.5ML injection Inject 0.5 mLs into the muscle 1 time. Repeat dose in 2 to 6 months (unless 1 dose already given), for a total of 2 doses. 1 each 1   • triamcinolone (ARISTOCORT) 0.1 % cream Tid prn rash 15 g 5   • atorvastatin (LIPITOR) 20 MG tablet Take 1 tablet by mouth daily. 90 tablet 3   • DISPENSE Inject 1 Units into the muscle 1 time. Zoster vaccine recomb adjuvanted (Shingrix) 50 mcg injection 1 Units 0   • amoxicillin (AMOXIL) 500 MG capsule Take 4 capsules by mouth once as needed (Take 1 hour prior to dental procedure). 4 capsule 1     No current facility-administered medications for this visit.       Objective:  Visit Vitals  /78 (BP Location: LUE - Left upper extremity, Patient Position: Sitting, Cuff Size: Regular)   Pulse 68   Temp 97.4 °F (36.3 °C) (Temporal)   Ht 6' 3\" (1.905 m)   Wt 105.1 kg   BMI 28.96 kg/m²   TMs negative conjunctiva clear nasal mucosa is unremarkable oropharynx is clear neck without adenopathy lungs are clear cardiac is regular without murmur extremities without edema he  does have some eczematous patches on the shins  Assessment and plan  1. Medicare subsequent  The following items on the Medicare Health Risk Assessment were found to be positive  1.) Do you have an Advance directive, living will, or power of  for health care document that contains your wishes for end of life care?: No     6 b.) How many servings of High Fiber / Whole Grain Foods to you have each day ( 1 serving = 1 cup cold cereal, 1/2 cup cooked cereal, 1 slice bread): 1 per day     11j.) Driven/Ridden in a car without wearing your seatbelt: Seldom         Vision and Hearing screens: No exam data present    Advance Directive:   The patient has the following documents:  Power of  for Health Care  Living Will (Declaration for Physicians)    Cognitive Assessment: no evidence of cognitive dysfunction by direct observation    Recent PHQ 2/9 Score    PHQ 2:  Date Adult PHQ 2 Score Adult PHQ 2 Interpretation   12/10/2020 0 No further screening needed       PHQ 9:  Date Adult PHQ 9 Score   12/10/2020 0       DEPRESSION ASSESSMENT/PLAN:  Depression screening is negative no further plan needed.     Body mass index is 28.96 kg/m².    BMI ASSESSMENT/PLAN:  Patient is overweight.    See patient education in AVS        Needed Screening/Treatment:   Immunizations reviewed and patient needs: Pneumococcal 23 which is provided and information is provided regarding tetanus vaccination  Needed follow up:  Patient follows with Cardiology    See orders.   See Patient Instructions section.   2. Rhinorrhea:  Seems to be related to allergies Claritin 10 mg daily as needed recommended  3. Gout:  Discussed the medication is for prevention with regards to the allopurinol however he feels he is doing fine with 3 times a week and he wishes to continue with this  4. A eczema:  Triamcinolone cream helpful refill will be provided but he is also instructed on the importance of regular moisturization  5. Osteoarthritis:  Doing great  after his knee replacement  6. Heavy alcohol use I asked him to cut back to 2-3 a day regular size servings  Return in about 1 year (around 12/10/2021) for jaqueline adkins gout bs lipid haily, fastlabprior.   Using strict sterile technique, an indwelling urinary device was inserted through the urethral meatus, and into the bladder (see size above).

## 2021-10-05 ENCOUNTER — APPOINTMENT (OUTPATIENT)
Dept: UROLOGY | Facility: CLINIC | Age: 71
End: 2021-10-05
Payer: MEDICARE

## 2021-10-05 VITALS
HEART RATE: 66 BPM | DIASTOLIC BLOOD PRESSURE: 71 MMHG | HEIGHT: 70 IN | SYSTOLIC BLOOD PRESSURE: 148 MMHG | BODY MASS INDEX: 25.05 KG/M2 | WEIGHT: 175 LBS

## 2021-10-05 PROCEDURE — 99214 OFFICE O/P EST MOD 30 MIN: CPT | Mod: 25

## 2021-10-05 PROCEDURE — A4216: CPT | Mod: NC

## 2021-10-05 PROCEDURE — 51798 US URINE CAPACITY MEASURE: CPT

## 2021-10-05 PROCEDURE — 51700 IRRIGATION OF BLADDER: CPT

## 2021-10-13 NOTE — ASSESSMENT
[FreeTextEntry1] : Benign Prostatic Hyperplasia: \par Urinary retention:\par Failed trial of void. \par PVR: 696 ml. \par Discussed treatment options mainly: continued medical management with alpha blocker and or 5 alpha reductase inhibitor Vs Clean intermittent catheterization/Indwelling Goldstein catheter Vs Surgery: Transurethral resection/vaporization/ablation of Prostate and Simple prostatectomy: open Vs robotic after appropriate work up.\par Also discussed emerging minimally invasive surgical therapies: Prostatic urethral lift (Urolift) and Water vapor thermal therapy (Rezum). \par Discussed risks and benefits of each. \par Will start Finasteride. \par Explained common side effects- decreased libido, erectile dysfunction, gynecomastia, orthostatic hypotension. Discussed with Patient that in 2011 the FDA required the 5-NATHALIA class of drugs  to include new safety information about the increased risk of being diagnosed with a more serious form of prostate cancer. \par Discussed that although high-grade prostate cancer was more common in patients on Finasteride this may be due to masking of early detection of low-grade tumors by reducing PSA levels and shrinkage of Prostate.\par Continue Silodosin. \par \par Return to office in 2 weeks or sooner if any issues: trial of void. \par \par \par \par \par

## 2021-10-13 NOTE — PHYSICAL EXAM
[Normal Appearance] : normal appearance [General Appearance - In No Acute Distress] : no acute distress [Skin Color & Pigmentation] : normal skin color and pigmentation [] : no respiratory distress [FreeTextEntry1] : normal peripheral circulation  [Oriented To Time, Place, And Person] : oriented to person, place, and time

## 2021-10-13 NOTE — LETTER BODY
[Dear  ___] : Dear  [unfilled], [Courtesy Letter:] : I had the pleasure of seeing your patient, [unfilled], in my office today. [Please see my note below.] : Please see my note below. [Sincerely,] : Sincerely, [FreeTextEntry3] : Roman Sanabria MD\par  of Urology\par [employer]\par \par Offices:\par [facility] @\par [address], [place] [address]\par and\par [address], [place] [address], [address]\par and\par [address] [address]\par \par TEL: [phone]\par FAX: [phone]

## 2021-10-13 NOTE — HISTORY OF PRESENT ILLNESS
[FreeTextEntry1] : 71 yo male presents for follow up. \par Taking Silodosin. \par No longer has Constipation. \par Goldstein to leg bag draining clear urine. \par \par Had called over weekend that he had Constipation had required straight catheterization and finally indwelling Goldstein catheter. Had difficult catheter placement and had hematuria, went back. Had gone to [facility]. \par Had recommended to stop Vesicare 10 mg, there was no difference. \par \par With Flomax had dizziness and fell in the past. \par \par PSA: 6.7; Free%: 39(7/27/21). \par \par Initially seen for Elevated PSA. \par Has history of Elevated PSA, status post negative prostate biopsy and has had MRI Prostate. \par Was following with Dr Flores. Had seen me in the past at [facility]. \par Denied any recent unintentional weight loss, night sweats and new bone or back pain.\par Family history of Prostate cancer- no. \par Reported variable stream, urinates every 2-3 hours or so during the day. Nocturia of 2-3 x. \par Endorsed off and on hesitancy and sense of incomplete emptying. Has occasional urinary incontinence at night. \par Denied dysuria, hematuria, lower abdominal or flank pain, fever, chills or rigors.\par Has Erectile dysfunction. Not sexually active.\par \par With Flomax- passed out. Was prescribed Alfuzosin, had not tried. Has Cardiac issues. \par \par

## 2021-10-19 ENCOUNTER — APPOINTMENT (OUTPATIENT)
Dept: UROLOGY | Facility: CLINIC | Age: 71
End: 2021-10-19
Payer: MEDICARE

## 2021-10-19 VITALS — SYSTOLIC BLOOD PRESSURE: 138 MMHG | HEART RATE: 78 BPM | DIASTOLIC BLOOD PRESSURE: 68 MMHG | TEMPERATURE: 97.8 F

## 2021-10-19 PROCEDURE — 51798 US URINE CAPACITY MEASURE: CPT

## 2021-10-19 PROCEDURE — 51700 IRRIGATION OF BLADDER: CPT

## 2021-10-19 PROCEDURE — A4216: CPT | Mod: NC

## 2021-10-20 ENCOUNTER — APPOINTMENT (OUTPATIENT)
Dept: UROLOGY | Facility: CLINIC | Age: 71
End: 2021-10-20
Payer: MEDICARE

## 2021-10-20 PROCEDURE — 51798 US URINE CAPACITY MEASURE: CPT

## 2021-10-20 PROCEDURE — 51702 INSERT TEMP BLADDER CATH: CPT

## 2021-11-16 ENCOUNTER — APPOINTMENT (OUTPATIENT)
Dept: UROLOGY | Facility: CLINIC | Age: 71
End: 2021-11-16
Payer: MEDICARE

## 2021-11-16 VITALS — HEART RATE: 77 BPM | TEMPERATURE: 97.8 F | SYSTOLIC BLOOD PRESSURE: 144 MMHG | DIASTOLIC BLOOD PRESSURE: 71 MMHG

## 2021-11-16 PROCEDURE — 51700 IRRIGATION OF BLADDER: CPT

## 2021-11-16 PROCEDURE — 51798 US URINE CAPACITY MEASURE: CPT

## 2021-11-16 PROCEDURE — A4218: CPT | Mod: NC

## 2021-11-16 RX ORDER — CEFUROXIME AXETIL 500 MG/1
500 TABLET ORAL
Refills: 0 | Status: COMPLETED | OUTPATIENT
Start: 2021-11-16

## 2021-11-16 RX ADMIN — CEFUROXIME AXETIL 0 MG: 500 TABLET, FILM COATED ORAL at 00:00

## 2021-11-23 ENCOUNTER — APPOINTMENT (OUTPATIENT)
Dept: UROLOGY | Facility: CLINIC | Age: 71
End: 2021-11-23
Payer: MEDICARE

## 2021-11-23 VITALS — TEMPERATURE: 98 F

## 2021-11-23 DIAGNOSIS — R33.9 RETENTION OF URINE, UNSPECIFIED: ICD-10-CM

## 2021-11-23 PROCEDURE — 51741 ELECTRO-UROFLOWMETRY FIRST: CPT

## 2021-11-23 PROCEDURE — 99214 OFFICE O/P EST MOD 30 MIN: CPT | Mod: 25

## 2021-11-23 PROCEDURE — 51798 US URINE CAPACITY MEASURE: CPT

## 2021-11-28 PROBLEM — R33.9 INCOMPLETE BLADDER EMPTYING: Status: ACTIVE | Noted: 2021-11-28

## 2021-11-28 NOTE — ASSESSMENT
[FreeTextEntry1] : Benign Prostatic Hyperplasia: \par Incomplete bladder emptying:\par See Uroflo and PVR. \par Discussed treatment options mainly: continued medical management with alpha blocker and or 5 alpha reductase inhibitor Vs Clean intermittent catheterization/Indwelling Goldstein catheter Vs Surgery: Transurethral resection/vaporization/ablation of Prostate and Simple prostatectomy: open Vs robotic after appropriate work up.\par Also discussed emerging minimally invasive surgical therapies: Prostatic urethral lift (Urolift) and Water vapor thermal therapy (Rezum). \par Discussed risks and benefits of each. \par Patient will consider his options. \par Continue Silodosin and Finasteride. \par Taking Silodosin with apple sauce, will try to take as whole. \par \par Return to office in 2 weeks or sooner if any issues: will do Uroflo/PVR.

## 2021-11-28 NOTE — LETTER BODY
[Dear  ___] : Dear  [unfilled], [Courtesy Letter:] : I had the pleasure of seeing your patient, [unfilled], in my office today. [Please see my note below.] : Please see my note below. [Sincerely,] : Sincerely, [FreeTextEntry3] : Roman Sanabria MD\par  of Urology\par [facility] [employer]\par \par Offices:\par [facility] @\par [address], [place] [address]\par and\par [address], [place] [address], [address]\par and\par [address] [address]\par \par TEL: [phone]\par FAX: [phone]

## 2021-11-28 NOTE — HISTORY OF PRESENT ILLNESS
[FreeTextEntry1] : 72 yo male presents for follow up. \par Taking Silodosin and Finasteride. \par Has variable stream, daytime frequency every 2 hours or so, nocturia every hour or so, has urinary incontinence. \par At last visit had successful trial of void. \par \par PSA: 6.7; Free%: 39(7/27/21). \par \par Initially seen for Elevated PSA. \par Has history of Elevated PSA, status post negative prostate biopsy and has had MRI Prostate. \par Was following with Dr Flores. Had seen me in the past at [facility]. \par Denied any recent unintentional weight loss, night sweats and new bone or back pain.\par Family history of Prostate cancer- no. \par Reported variable stream, urinates every 2-3 hours or so during the day. Nocturia of 2-3 x. \par Endorsed off and on hesitancy and sense of incomplete emptying. Has occasional urinary incontinence at night. \par Denied dysuria, hematuria, lower abdominal or flank pain, fever, chills or rigors.\par Has Erectile dysfunction. Not sexually active.\par \par With Flomax- passed out. Was prescribed Alfuzosin, had not tried. Has Cardiac issues. \par \par

## 2021-12-07 ENCOUNTER — APPOINTMENT (OUTPATIENT)
Dept: UROLOGY | Facility: CLINIC | Age: 71
End: 2021-12-07
Payer: MEDICARE

## 2021-12-07 PROCEDURE — 51741 ELECTRO-UROFLOWMETRY FIRST: CPT

## 2021-12-07 PROCEDURE — 99213 OFFICE O/P EST LOW 20 MIN: CPT | Mod: 25

## 2021-12-07 PROCEDURE — 51798 US URINE CAPACITY MEASURE: CPT

## 2021-12-07 NOTE — HISTORY OF PRESENT ILLNESS
[FreeTextEntry1] : 72 yo male presents for follow up. \par Taking Silodosin(not with apple sauce) and Finasteride. \par Has variable stream, daytime frequency every 2 hours or so, nocturia every 2 hours or so, has occasional urinary incontinence. \par Denies dysuria, hematuria, lower abdominal or flank pain, nausea, vomiting, fever, chills or rigors. \par At last visit was able to urinate at trial of void. \par \par PSA: 6.7; Free%: 39(7/27/21). \par \par Initially seen for Elevated PSA. \par Has history of Elevated PSA, status post negative prostate biopsy and has had MRI Prostate. \par Was following with Dr Crum and [place]. Had seen me in the past at [facility]. \par Denied any recent unintentional weight loss, night sweats and new bone or back pain.\par Family history of Prostate cancer- no. \par Reported variable stream, urinates every 2-3 hours or so during the day. Nocturia of 2-3 x. \par Endorsed off and on hesitancy and sense of incomplete emptying. Has occasional urinary incontinence at night. \par Denied dysuria, hematuria, lower abdominal or flank pain, fever, chills or rigors.\par Has Erectile dysfunction. Not sexually active.\par \par With Flomax- passed out. Was prescribed Alfuzosin, had not tried. Has Cardiac issues. \par \par

## 2021-12-07 NOTE — LETTER BODY
[Dear  ___] : Dear  [unfilled], [Courtesy Letter:] : I had the pleasure of seeing your patient, [unfilled], in my office today. [Please see my note below.] : Please see my note below. [Sincerely,] : Sincerely, [FreeTextEntry3] : Roman Sanabria MD\par  of Urology\par [facility] [employer]\par \par Offices:\par [facility] @\par [address], [place] [address]\par and\par [address], [place] [address], [address]\par and\par [address] [address]\par \par TEL: [phone]\par FAX: [phone]

## 2021-12-07 NOTE — ASSESSMENT
[FreeTextEntry1] : Benign Prostatic Hyperplasia:\par See Uroflo and PVR. \par Discussed treatment options. Will continue Silodosin and Finasteride. \par \par Return to office in 3 months or sooner if any issues: will do Uroflo/PVR.

## 2022-03-08 ENCOUNTER — APPOINTMENT (OUTPATIENT)
Dept: UROLOGY | Facility: CLINIC | Age: 72
End: 2022-03-08
Payer: MEDICARE

## 2022-03-08 VITALS — TEMPERATURE: 97.8 F

## 2022-03-08 PROCEDURE — 51798 US URINE CAPACITY MEASURE: CPT

## 2022-03-08 PROCEDURE — 99214 OFFICE O/P EST MOD 30 MIN: CPT | Mod: 25

## 2022-03-08 PROCEDURE — 51741 ELECTRO-UROFLOWMETRY FIRST: CPT

## 2022-03-13 NOTE — ASSESSMENT
[FreeTextEntry1] : Reviewed outside records on [facility]. \par Had labs 2/22/2022 with PCP.\par Urinalysis: negative \par PSA: 6.7; Free%: 39(7/27/21)-->4.91;Free% 25(2/22/2022)\par \par Elevated PSA:\par PSA before follow up appointment.\par  \par Benign Prostatic Hyperplasia: \par See Uroflo and PVR. \par Discussed treatment options mainly: continued medical management with alpha blocker and or 5 alpha reductase inhibitor Vs Clean intermittent catheterization/Indwelling Goldstein catheter Vs Surgery: Transurethral resection/vaporization/ablation of Prostate and Simple prostatectomy: open Vs robotic after appropriate work up.\par Also discussed emerging minimally invasive surgical therapies: Prostatic urethral lift (Urolift) and Water vapor thermal therapy (Rezum). \par Discussed risks and benefits of each. \par Patient will consider his options. \par Will continue Silodosin and Finasteride for now. \par \par Return to office in 3 months or sooner if any issues: will do Uroflo/PVR. \par \par \par \par \par \par

## 2022-03-13 NOTE — HISTORY OF PRESENT ILLNESS
[FreeTextEntry1] : 72 yo male presents for follow up. \par Taking Silodosin(not with apple sauce) and Finasteride. \par Has slow stream, daytime frequency every 1-2 hours or so, nocturia every 2 hours or so, has occasional urinary incontinence. \par Denies dysuria, hematuria, lower abdominal or flank pain, nausea, vomiting, fever, chills or rigors. \par \par MRI Prostate(12/21/2018): \par PSA: 9.6 \par Prostate volume- 90 cc\par PI-RADS 2 lesion. \par \par Initially seen for Elevated PSA. \par Has history of Elevated PSA, status post negative prostate biopsy and has had MRI Prostate. \par Was following with Dr Crum and [place]. Had seen me in the past at [facility]. \par Denied any recent unintentional weight loss, night sweats and new bone or back pain.\par Family history of Prostate cancer- no. \par Reported variable stream, urinates every 2-3 hours or so during the day. Nocturia of 2-3 x. \par Endorsed off and on hesitancy and sense of incomplete emptying. Has occasional urinary incontinence at night. \par Denied dysuria, hematuria, lower abdominal or flank pain, fever, chills or rigors.\par Has Erectile dysfunction. Not sexually active.\par \par With Flomax- passed out. Was prescribed Alfuzosin, had not tried. Has Cardiac issues. \par \par

## 2022-04-22 ENCOUNTER — APPOINTMENT (OUTPATIENT)
Dept: UROLOGY | Facility: CLINIC | Age: 72
End: 2022-04-22
Payer: MEDICARE

## 2022-04-22 VITALS
HEIGHT: 70 IN | WEIGHT: 175 LBS | HEART RATE: 79 BPM | BODY MASS INDEX: 25.05 KG/M2 | SYSTOLIC BLOOD PRESSURE: 161 MMHG | TEMPERATURE: 97.5 F | RESPIRATION RATE: 14 BRPM | DIASTOLIC BLOOD PRESSURE: 77 MMHG

## 2022-04-22 PROCEDURE — 99214 OFFICE O/P EST MOD 30 MIN: CPT

## 2022-04-22 RX ORDER — CEFUROXIME AXETIL 500 MG/1
500 TABLET ORAL
Qty: 1 | Refills: 0 | Status: COMPLETED | COMMUNITY
Start: 2021-11-16 | End: 2022-04-22

## 2022-04-22 RX ORDER — ATORVASTATIN CALCIUM 40 MG/1
40 TABLET, FILM COATED ORAL
Refills: 0 | Status: ACTIVE | COMMUNITY
Start: 2022-04-22

## 2022-04-22 RX ORDER — CIPROFLOXACIN HYDROCHLORIDE 500 MG/1
500 TABLET, FILM COATED ORAL TWICE DAILY
Qty: 10 | Refills: 0 | Status: COMPLETED | COMMUNITY
Start: 2021-09-28 | End: 2022-04-22

## 2022-04-22 RX ORDER — ALFUZOSIN HYDROCHLORIDE 10 MG/1
10 TABLET, EXTENDED RELEASE ORAL DAILY
Refills: 0 | Status: COMPLETED | COMMUNITY
Start: 2020-10-19 | End: 2022-04-22

## 2022-04-22 RX ORDER — CLOPIDOGREL BISULFATE 75 MG/1
75 TABLET, FILM COATED ORAL
Qty: 90 | Refills: 3 | Status: COMPLETED | COMMUNITY
Start: 2021-01-15 | End: 2022-04-22

## 2022-04-22 RX ORDER — FINASTERIDE 5 MG/1
5 TABLET, FILM COATED ORAL DAILY
Qty: 90 | Refills: 1 | Status: DISCONTINUED | COMMUNITY
Start: 2021-10-05 | End: 2022-04-22

## 2022-04-22 RX ORDER — CIPROFLOXACIN HYDROCHLORIDE 500 MG/1
500 TABLET, FILM COATED ORAL
Qty: 10 | Refills: 0 | Status: COMPLETED | COMMUNITY
Start: 2021-11-16 | End: 2022-04-22

## 2022-04-22 RX ORDER — CEFUROXIME AXETIL 500 MG/1
500 TABLET ORAL
Qty: 1 | Refills: 0 | Status: COMPLETED | COMMUNITY
Start: 2021-10-19 | End: 2022-04-22

## 2022-04-22 RX ORDER — POLYETHYLENE GLYOCOL 3350, SODIUM CHLORIDE, SODIUM BICARBONATE AND POTASSIUM CHLORIDE 420; 11.2; 5.72; 1.48 G/4L; G/4L; G/4L; G/4L
420 POWDER, FOR SOLUTION NASOGASTRIC; ORAL
Qty: 1 | Refills: 0 | Status: COMPLETED | COMMUNITY
Start: 2020-08-27 | End: 2022-04-22

## 2022-04-22 RX ORDER — SOLIFENACIN SUCCINATE 5 MG/1
5 TABLET ORAL
Qty: 30 | Refills: 5 | Status: COMPLETED | COMMUNITY
Start: 2021-08-10 | End: 2022-04-22

## 2022-04-22 RX ORDER — CICLOPIROX 80 MG/ML
8 SOLUTION TOPICAL
Qty: 7 | Refills: 0 | Status: COMPLETED | COMMUNITY
Start: 2020-08-06 | End: 2022-04-22

## 2022-04-22 RX ORDER — CIPROFLOXACIN HYDROCHLORIDE 500 MG/1
500 TABLET, FILM COATED ORAL TWICE DAILY
Qty: 6 | Refills: 0 | Status: COMPLETED | COMMUNITY
Start: 2021-10-06 | End: 2022-04-22

## 2022-04-22 RX ORDER — PANTOPRAZOLE 40 MG/1
40 TABLET, DELAYED RELEASE ORAL DAILY
Qty: 90 | Refills: 0 | Status: COMPLETED | COMMUNITY
Start: 2020-12-29 | End: 2022-04-22

## 2022-04-22 RX ORDER — GABAPENTIN 100 MG/1
100 CAPSULE ORAL 3 TIMES DAILY
Refills: 0 | Status: COMPLETED | COMMUNITY
Start: 2020-09-19 | End: 2022-04-22

## 2022-04-23 NOTE — ASSESSMENT
[FreeTextEntry1] : Pt also interested in trying  Avodart or PAE \par \par Discussed Laser enucleation of prostate with morcellation (HOLEP/ThuLEP).\par \par Indications, options including chronic Goldstein catheter, suprapubic catheter, intermittent self-catheterization, transurethral resection of prostate, transurethral vaporization of prostate with laser or electrocautery, open or laparoscopic enucleation of the prostate, all discussed.\par \par Potential complications of transurethral holmium or thulium laser enucleation of prostate with morcellation discussed. This included risk of infection, bleeding, transfusion, conversion to open enucleation, need for staged procedure, adjacent organ injury, bladder injury, clot retention of urine requiring return to operating room for cystoscopy clot evacuation, prolonged duration of Goldstein catheterization, urethral stricture, transient or permanent urinary incontinence, retrograde ejaculation is a permanent and irreversible complication, redo or staged surgery due to equipment malfunction, anesthetic complications, cardiac complications, all discussed. \par \par Emphasized to patient that his incontinence may not improve after surgery depending on the etiology of the incontience.\par \par Printed information including of the above and other more uncommon complications provided to patient.\par \par Gave patient information on PAE as well as number for Dr. Germain to call for consultation appointment for PAE discussion. \par \par Also gave information for Dr. Beal since patient is interested in having Laser enucleation of prostate closer to his home if he chooses this treatment option.\par

## 2022-04-23 NOTE — PHYSICAL EXAM
[General Appearance - In No Acute Distress] : no acute distress [Edema] : no peripheral edema [] : no respiratory distress [Not Anxious] : not anxious [Normal Station and Gait] : the gait and station were normal for the patient's age [Abdomen Soft] : soft [Abdomen Tenderness] : non-tender

## 2022-05-20 ENCOUNTER — APPOINTMENT (OUTPATIENT)
Dept: UROLOGY | Facility: CLINIC | Age: 72
End: 2022-05-20
Payer: MEDICARE

## 2022-05-20 VITALS
SYSTOLIC BLOOD PRESSURE: 138 MMHG | HEART RATE: 65 BPM | DIASTOLIC BLOOD PRESSURE: 76 MMHG | WEIGHT: 181 LBS | OXYGEN SATURATION: 98 % | HEIGHT: 70 IN | BODY MASS INDEX: 25.91 KG/M2

## 2022-05-20 PROCEDURE — 99213 OFFICE O/P EST LOW 20 MIN: CPT | Mod: 25

## 2022-05-20 PROCEDURE — 51798 US URINE CAPACITY MEASURE: CPT

## 2022-05-26 NOTE — ASSESSMENT
[FreeTextEntry1] : Benign Prostatic Hyperplasia:\par Discussed treatment options. Discussed side effect profile of Silodosin and Avodart. \par Will follow PCP to rule out Herniated disc.\par Will continue Silodosin and Avodart. \par \par Return to office for scheduled appointment or sooner if any issues.

## 2022-05-26 NOTE — HISTORY OF PRESENT ILLNESS
[FreeTextEntry1] : 70 yo male presents to discuss medication. \par Saw Dr Campbell discussed options. \par On Avodart and Silodosin. Has off and on split stream, daytime frequency every 2 hours, nocturia 1 x. \par Complaining of lower back tightness, hamstring pain and is unsteady to walk. \par On Statin. \par \par MRI Prostate(12/21/2018): \par PSA: 9.6 \par Prostate volume- 90 cc\par PI-RADS 2 lesion. \par \par Initially seen for Elevated PSA. \par Has history of Elevated PSA, status post negative prostate biopsy and has had MRI Prostate. \par Was following with Dr Crum and [place]. Had seen me in the past at [facility]. \par Denied any recent unintentional weight loss, night sweats and new bone or back pain.\par Family history of Prostate cancer- no. \par Reported variable stream, urinates every 2-3 hours or so during the day. Nocturia of 2-3 x. \par Endorsed off and on hesitancy and sense of incomplete emptying. Has occasional urinary incontinence at night. \par Denied dysuria, hematuria, lower abdominal or flank pain, fever, chills or rigors.\par Has Erectile dysfunction. Not sexually active.\par \par With Flomax- passed out. Was prescribed Alfuzosin, had not tried. Has Cardiac issues. \par \par

## 2022-05-26 NOTE — PHYSICAL EXAM
[Normal Appearance] : normal appearance [General Appearance - In No Acute Distress] : no acute distress [Abdomen Soft] : soft [Abdomen Tenderness] : non-tender [] : no respiratory distress [Oriented To Time, Place, And Person] : oriented to person, place, and time [FreeTextEntry1] : walks with walking stick

## 2022-05-26 NOTE — LETTER BODY
[Dear  ___] : Dear  [unfilled], [Courtesy Letter:] : I had the pleasure of seeing your patient, [unfilled], in my office today. [Please see my note below.] : Please see my note below. [Sincerely,] : Sincerely, [FreeTextEntry3] : Roman Sanabria MD\par  of Urology\par [facility] [employer]\par \par Offices:\par [facility] @\par [address], [place] [address]\par and\par [address], [place] [address], [address]\par and\par [address] [address]\par \par TEL: [phone]\par FAX: [phone]

## 2022-06-17 ENCOUNTER — NON-APPOINTMENT (OUTPATIENT)
Age: 72
End: 2022-06-17

## 2022-06-22 ENCOUNTER — NON-APPOINTMENT (OUTPATIENT)
Age: 72
End: 2022-06-22

## 2022-07-12 ENCOUNTER — NON-APPOINTMENT (OUTPATIENT)
Age: 72
End: 2022-07-12

## 2022-07-13 ENCOUNTER — TRANSCRIPTION ENCOUNTER (OUTPATIENT)
Age: 72
End: 2022-07-13

## 2022-07-15 ENCOUNTER — NON-APPOINTMENT (OUTPATIENT)
Age: 72
End: 2022-07-15

## 2022-08-23 ENCOUNTER — APPOINTMENT (OUTPATIENT)
Dept: UROLOGY | Facility: CLINIC | Age: 72
End: 2022-08-23

## 2022-08-23 PROCEDURE — 51741 ELECTRO-UROFLOWMETRY FIRST: CPT

## 2022-08-23 PROCEDURE — 99214 OFFICE O/P EST MOD 30 MIN: CPT | Mod: 25

## 2022-08-23 PROCEDURE — 51798 US URINE CAPACITY MEASURE: CPT

## 2022-08-23 RX ORDER — DUTASTERIDE 0.5 MG/1
0.5 CAPSULE, LIQUID FILLED ORAL
Qty: 90 | Refills: 3 | Status: DISCONTINUED | COMMUNITY
Start: 2022-04-22 | End: 2022-08-23

## 2022-08-23 NOTE — PHYSICAL EXAM
[Urethral Meatus] : meatus normal [Penis Abnormality] : normal circumcised penis [Scrotum] : the scrotum was normal [Epididymis] : the epididymides were normal [Testes Tenderness] : no tenderness of the testes [Testes Mass (___cm)] : there were no testicular masses [Prostate Tenderness] : the prostate was not tender [No Prostate Nodules] : no prostate nodules [Prostate Size ___ (0-4)] : prostate size [unfilled] (scale: 0-4) [Normal Appearance] : normal appearance [General Appearance - In No Acute Distress] : no acute distress [Abdomen Soft] : soft [Abdomen Tenderness] : non-tender [] : no respiratory distress [Oriented To Time, Place, And Person] : oriented to person, place, and time [Normal Station and Gait] : the gait and station were normal for the patient's age

## 2022-08-23 NOTE — HISTORY OF PRESENT ILLNESS
[FreeTextEntry1] : 72 yo male presents follow up \par On Silodosin and Dutasteride. Felt was better on Finasteride. \par Still has split stream, starts forceful and then slows down, daytime frequency every 2-3 hours, nocturia 2-4 x. \par Has urinary incontinence day and night. \par Denies dysuria, hematuria, lower abdominal or flank pain, nausea, vomiting, fever, chills or rigors. \par Had called with PSA result. Had MRI Prostate. \par \par MRI Prostate(6/29/22):\par PSA: 6.43(6/16/2022) on Dutasteride. \par Prostate volume: 70 cc. \par No MRI suspicious lesion. PIRADS 2. \par \par Seen on 5/20/22 to discuss medication. \par Saw Dr Campbell discussed options. \par On Avodart and Silodosin. Had off and on split stream, daytime frequency every 2 hours, nocturia 1 x. \par Complained of lower back tightness, hamstring pain and is unsteady to walk. \par On Statin. \par \par MRI Prostate(12/21/2018): \par PSA: 9.6 \par Prostate volume- 90 cc\par PIRADS 2 lesion. \par \par Initially seen for Elevated PSA. \par Has history of Elevated PSA, status post negative prostate biopsy and has had MRI Prostate. \par Was following with Dr Flores. Had seen me in the past at [facility]. \par Denied any recent unintentional weight loss, night sweats and new bone or back pain.\par Family history of Prostate cancer- no. \par Reported variable stream, urinates every 2-3 hours or so during the day. Nocturia of 2-3 x. \par Endorsed off and on hesitancy and sense of incomplete emptying. Has occasional urinary incontinence at night. \par Denied dysuria, hematuria, lower abdominal or flank pain, fever, chills or rigors.\par Has Erectile dysfunction. Not sexually active.\par \par With Flomax- passed out. Was prescribed Alfuzosin, had not tried. Has Cardiac issues. \par \par

## 2022-08-23 NOTE — LETTER BODY
[Dear  ___] : Dear  [unfilled], [Courtesy Letter:] : I had the pleasure of seeing your patient, [unfilled], in my office today. [Please see my note below.] : Please see my note below. [Sincerely,] : Sincerely, [FreeTextEntry3] : Roman Sanabria MD\par  of Urology\par [facility]\par \par [facility]\par Offices:\par [address] [facility]\par [address] [address]\par [address] [facility], [address]\par \par TEL: [phone]\par FAX: [phone]

## 2022-08-23 NOTE — ASSESSMENT
[FreeTextEntry1] : Benign Prostatic Hyperplasia:\par See Uroflo and PVR. \par Discussed treatment options. Will continue Silodosin.  \par Will like to switch to Finasteride. \par \par Elevated PSA:\par Continue PSA monitoring. PSA before follow up appointment, will do PCP. \par \par Return to office in 3 months or sooner if any issues: will do Uroflo/PVR.

## 2022-10-24 ENCOUNTER — TRANSCRIPTION ENCOUNTER (OUTPATIENT)
Age: 72
End: 2022-10-24

## 2022-10-25 ENCOUNTER — TRANSCRIPTION ENCOUNTER (OUTPATIENT)
Age: 72
End: 2022-10-25

## 2022-11-21 NOTE — PROGRESS NOTE ADULT - REASON FOR ADMISSION
Orthopedic NP Progress Note  Post Op day: 1 Day Post-Op    November 21, 2022 9:46 AM     Jose Chatterjee    Attending Physician: Treatment Team: Attending Provider: Ariadne Vázquez MD; Consulting Provider: Cuca Rojas MD; Physician Assistant: MACKENZIE Polanco; Care Manager: Grant Newton Primary Nurse: Lillian Rey RN     Vital Signs:    Patient Vitals for the past 8 hrs:   BP Temp Pulse Resp SpO2   11/21/22 0829 (!) 105/53 99.2 °F (37.3 °C) 87 16 --   11/21/22 0809 (!) 106/59 99.1 °F (37.3 °C) 82 16 96 %   11/21/22 0424 113/64 98.9 °F (37.2 °C) 81 12 97 %     BMI (calculated): 25 (11/19/22 1547)    Intake/Output:  11/21 0701 - 11/21 1900  In: -   Out: 800 [Urine:800]  11/19 1901 - 11/21 0700  In: 0010 [P.O.:15; I.V.:1550]  Out: 450 [Urine:200]    Pain Control:   Pain Assessment  Pain Scale 1: Numeric (0 - 10)  Pain Intensity 1: 8  Pain Onset 1: post op  Pain Location 1: Leg  Pain Orientation 1: Left  Pain Description 1: Aching  Pain Intervention(s) 1: Medication (see MAR)    LAB:    Recent Labs     11/20/22  0430   HCT 35.7   HGB 11.6     Lab Results   Component Value Date/Time    Sodium 136 11/20/2022 04:30 AM    Potassium 3.9 11/20/2022 04:30 AM    Chloride 103 11/20/2022 04:30 AM    CO2 28 11/20/2022 04:30 AM    Glucose 116 (H) 11/20/2022 04:30 AM    BUN 10 11/20/2022 04:30 AM    Creatinine 0.56 11/20/2022 04:30 AM    Calcium 9.3 11/20/2022 04:30 AM       Subjective:  Jose Chatterjee is a 74 y.o. female s/p a  Procedure(s):  TIBIAL PLATEAU OPEN REDUCTION INTERNAL FIXATION   Procedure(s):  TIBIAL PLATEAU OPEN REDUCTION INTERNAL FIXATION. Tolerating diet. Resting in bed, pain is well managed at this time, pt reports a spike in pain earlier this am that she was medicated for, reports anxious about PT and dispo plan        Objective: General: alert, cooperative, no distress. Neuro/Vascular: CNS Intact. Sensation stable.  Brisk cap refill, 2+ pulses UE/LE  Musculoskeletal:  +ROM UE/RLE with knee
Chest pain
Chest pain
immobilizer locked in extension - post op dressign in place with expected post op edema noted, +DF/PF, NVI . Skin: Incision - clean, dry and intact. No significant erythema or swelling.     Dressing: clean, dry, and intact    Young - y  Drain - n       PT/OT:   Gait:                      Assessment:    s/p Procedure(s):  TIBIAL PLATEAU OPEN REDUCTION INTERNAL FIXATION    Active Problems:    Tibia fracture (11/19/2022)         Plan:   -  Continue PT/OT - NWB LLE with T scope knee brace in locked position at all times, plan for NWB 10-12 weeks   -  Continue established methods of pain control  -  VTE Prophylaxes - TEDS &/or SCDs with Lovenox for min of 30 days    -  DC young today if not already done     Discharge To:  TBD, discussed DC options to include IPR vs SNF with pt and family at bedside      Signed By: Jessica Hanson NP    Orthopedic Nurse Practitioner
Chest pain

## 2022-11-22 ENCOUNTER — APPOINTMENT (OUTPATIENT)
Dept: UROLOGY | Facility: CLINIC | Age: 72
End: 2022-11-22

## 2022-11-22 PROCEDURE — 99214 OFFICE O/P EST MOD 30 MIN: CPT | Mod: 25

## 2022-11-22 PROCEDURE — 51798 US URINE CAPACITY MEASURE: CPT

## 2022-11-23 NOTE — ED PROVIDER NOTE - PROGRESS NOTE ADDITIONAL1
[facility] Hepatology    Follow-up Visit    CONSULTING HEALTHCARE PROVIDER:  Luda Caraballo MD       CHIEF COMPLAINT AND REASON FOR INITIAL VISIT:  Decompensated alcoholic cirrhosis.      PRIMARY CARE PHYSICIAN:  Jayjay Zaidi MD     SUBJECTIVE:  Mr. Herrera is a 34-year-old male with a history of alcohol abuse disorder, and we saw him initially for alcoholic hepatitis/alcoholic cirrhosis.  He had decompensation by the time we saw him. He had edema and ascites, and he also had minimal lethargy and no confusion.      Mr. Herrera when he came here also had problems with acute kidney injury, which has since normalized.  He did do some dialysis in 08/2020, and this has been discontinued.  Mr. Herrera on 08/27/2021 had also a TIPS procedure as the patient's MELD score was low, and he was having mostly fluid retention.  That has since improved and disappeared.  His TIPS was revised on 11/24/2021 and on his last Doppler was working well.  He does not have any need now for any paracentesis.     Today he is doing quite well and dry.  In 07/2021, he got the COVID infection, and he did not get very sick since he had already vaccination with the Pfizer brand x4.  Today he is not jaundiced, nor does he have any edema in the lower extremities or abdominal distention.  He denies, also, any abdominal pain, nausea or vomiting.  He does not show any signs of hepatic encephalopathy. Since we last saw him, also, he did not have any gastrointestinal bleeding.  Mr. Herrera also has bowel movements 2-3 times a day with no blood in it.  His weight has remained stable.  Appetite is also good.    Medical hx Surgical hx   Past Medical History:   Diagnosis Date     Alcoholic cirrhosis of liver with ascites (H) 03/10/2020     Ascites      CKD (chronic kidney disease) stage 3, GFR 30-59 ml/min (H)      Essential (primary) hypertension 09/17/2020     GERD (gastroesophageal reflux disease)      HE (hepatic encephalopathy)       History of blood transfusion      History of ESRD requiring dialysis 12/2019    Resolved     Restless legs syndrome (RLS)       Past Surgical History:   Procedure Laterality Date     COLONOSCOPY      [facility] 2020     DENTAL SURGERY  2014    wisdom teeth     GI SURGERY       IR PARACENTESIS  8/27/2021     IR TRANSVEN INTRAHEPATIC PORTOSYST REV  11/24/2021     IR TRANSVEN INTRAHEPATIC PORTOSYST SHUNT  8/27/2021     US PARACENTESIS WITH ALBUMIN  04/02/2020     US PARACENTESIS WITH ALBUMIN  04/21/2020     US PARACENTESIS WITH ALBUMIN  06/04/2020     US PARACENTESIS WITH ALBUMIN  06/19/2020     US PARACENTESIS WITH ALBUMIN  07/29/2020     US PARACENTESIS WITH ALBUMIN  10/05/2020     US PARACENTESIS WITH ALBUMIN  11/03/2020     US PARACENTESIS WITH ALBUMIN  11/11/2020     US PARACENTESIS WITH ALBUMIN  11/24/2020     US PARACENTESIS WITH ALBUMIN  12/03/2020     US PARACENTESIS WITH ALBUMIN  12/11/2020     US PARACENTESIS WITH ALBUMIN  12/18/2020     US PARACENTESIS WITH ALBUMIN  12/30/2020     US PARACENTESIS WITH ALBUMIN  01/07/2021     US PARACENTESIS WITH ALBUMIN  01/18/2021     US PARACENTESIS WITH ALBUMIN  01/27/2021     US PARACENTESIS WITH ALBUMIN  02/05/2021     US PARACENTESIS WITH ALBUMIN  02/16/2021     US PARACENTESIS WITH ALBUMIN  02/26/2021     US PARACENTESIS WITH ALBUMIN  03/12/2021     US PARACENTESIS WITH ALBUMIN  03/22/2021     US PARACENTESIS WITH ALBUMIN  04/01/2021     US PARACENTESIS WITH ALBUMIN  04/12/2021     US PARACENTESIS WITH ALBUMIN  04/23/2021     US PARACENTESIS WITH ALBUMIN  05/04/2021     US PARACENTESIS WITH ALBUMIN  05/14/2021     US PARACENTESIS WITH ALBUMIN  05/24/2021     US PARACENTESIS WITH ALBUMIN  06/03/2021     US PARACENTESIS WITH ALBUMIN  06/14/2021     US PARACENTESIS WITH ALBUMIN  06/25/2021     US PARACENTESIS WITH ALBUMIN  07/05/2021          Medications  Prior to Admission medications    Medication Sig Start Date End Date Taking? Authorizing Provider   furosemide  "(LASIX) 20 MG tablet TAKE 1/2 TABLET(10 MG) BY MOUTH DAILY 11/17/22  Yes Brandin Echavarria MD   lactulose (CHRONULAC) 10 GM/15ML solution Take 10-15 mLs by mouth 2 times daily 2/15/20  Yes Reported, Patient   Menthol-Methyl Salicylate (ICY HOT EXTRA STRENGTH) 10-30 % CREA Apply topically daily as needed    Yes Reported, Patient   midodrine (PROAMATINE) 5 MG tablet Take 2.5 mg by mouth 3 times daily as needed 2/15/20  Yes Reported, Patient   Multiple Vitamins-Minerals (SUPER THERA PRERNA M) TABS Take 1 tablet by mouth every morning  12/29/19  Yes Reported, Patient   Psyllium 58.12 % PACK Take by mouth daily as needed    Yes Reported, Patient   spironolactone (ALDACTONE) 25 MG tablet TAKE 1 TABLET(25 MG) BY MOUTH DAILY 11/17/22  Yes Brandin Echavarria MD   XIFAXAN 550 MG TABS tablet TAKE 1 TABLET(550 MG) BY MOUTH TWICE DAILY 5/31/22  Yes Brandin Echavarria MD   gabapentin (NEURONTIN) 100 MG capsule Take 100 mg by mouth nightly as needed  8/11/20   Reported, Patient   guaiFENesin (MUCINEX) 600 MG 12 hr tablet Take 600 mg by mouth 2 times daily as needed for congestion or cough  2/15/20   Reported, Patient   ondansetron (ZOFRAN) 8 MG tablet Take 0.5 tablets (4 mg) by mouth every 8 hours as needed for nausea 8/27/21   Av Waldron MD       Allergies  Allergies   Allergen Reactions     Blood Transfusion Related (Informational Only) Other (See Comments)     Patient has a history of a clinically significant antibody against RBC antigens.  A delay in compatible RBCs may occur.        Review of systems  A 10-point review of systems was negative    Examination  /74 (BP Location: Right arm, Patient Position: Sitting, Cuff Size: Adult Regular)   Pulse 74   Temp 97.7  F (36.5  C) (Oral)   Resp 16   Ht 1.702 m (5' 7.01\")   Wt 64 kg (141 lb 1.6 oz)   SpO2 98%   BMI 22.09 kg/m    Body mass index is 22.09 kg/m .    Gen- well, NAD, A+Ox3, normal color  Lym- no palpable LAD  CVS- RRR  RS- " CTA  Abd- Not distended.  Extr- hands normal, no LARISSA  Skin- no rash or jaundice  Neuro- no asterixis  Psych- normal mood    Laboratory  Lab Results   Component Value Date     11/23/2022     01/12/2021    POTASSIUM 4.8 11/23/2022    POTASSIUM 4.3 04/26/2022    POTASSIUM 5.4 01/12/2021    CHLORIDE 107 11/23/2022    CHLORIDE 108 04/26/2022    CHLORIDE 108 01/12/2021    CO2 23 11/23/2022    CO2 22 04/26/2022    CO2 23 01/12/2021    BUN 32.2 11/23/2022    BUN 22 04/26/2022    BUN 48 01/12/2021    CR 1.20 11/23/2022    CR 1.39 01/12/2021       Lab Results   Component Value Date    BILITOTAL 1.0 11/23/2022    BILITOTAL 1.2 01/12/2021    ALT 63 11/23/2022    ALT 32 01/12/2021    AST 65 11/23/2022    AST 43 01/12/2021    ALKPHOS 185 11/23/2022    ALKPHOS 128 01/12/2021       Lab Results   Component Value Date    ALBUMIN 3.8 11/23/2022    ALBUMIN 3.6 04/26/2022    ALBUMIN 3.3 01/12/2021    PROTTOTAL 6.5 11/23/2022    PROTTOTAL 6.1 01/12/2021        Lab Results   Component Value Date    WBC 5.6 11/23/2022    WBC 4.9 01/12/2021    HGB 15.6 11/23/2022    HGB 13.4 01/12/2021    MCV 95 11/23/2022    MCV 95 01/12/2021     11/23/2022     01/12/2021       Lab Results   Component Value Date    INR 1.13 11/23/2022    INR 1.2 07/20/2021    INR 1.31 01/12/2021   MELD-Na score: 9 at 11/23/2022  9:23 AM  MELD score: 9 at 11/23/2022  9:23 AM  Calculated from:  Serum Creatinine: 1.20 mg/dL at 11/23/2022  9:23 AM  Serum Sodium: 140 mmol/L (Using max of 137 mmol/L) at 11/23/2022  9:23 AM  Total Bilirubin: 1.0 mg/dL at 11/23/2022  9:23 AM  INR(ratio): 1.13 at 11/23/2022  9:23 AM  Age: 34 years      Radiology    ASSESSMENT AND PLAN:  Decompensated alcoholic liver disease.  Mr. Herrera had what appeared to be decompensated alcoholic cirrhosis.  It might have been acute on chronic, as he might have had hepatitis/advanced liver disease but his MELD score was low.  We did evaluate him and eventually did a TIPS procedure  and this was revised also once and now it is patent.  To the advantage of that, his fluid retention, which was his main problem, resolved.      He has not required any paracentesis now and his edema was resolved.  Please note that the patient today is very lucid and states that with the rifaximin and the lactulose, he is doing quite well and does not have any signs of confusion.  Otherwise, his MELD score which was 11 on 01/04/2022 is currently 9 which is an improvement.  He is listed for liver transplantation.  We will follow him for a while and see how he does before we do any change in his status.  Besides that, he will need surveillance for HCC and also for the functionality of his TIPS on a regular basis.  He will be seen here in 6 months.  Mr. Herrera, for his other medical issues, will follow with his primary care physician and we will see him in 6 months.    I spent, on this encounter of 11/23/2022, 30 minutes in chart reviewing, history taking, physical examination and documentation.  I spent some of the time also in coordination of care and counseling.      Brandin Echavarria MD  Hepatology  [facility]     Additional Progress Note...

## 2022-11-28 NOTE — LETTER BODY
[Dear  ___] : Dear  [unfilled], [Courtesy Letter:] : I had the pleasure of seeing your patient, [unfilled], in my office today. [Please see my note below.] : Please see my note below. [Sincerely,] : Sincerely, [FreeTextEntry3] : Roman Sanabria MD\par  of Urology\par [facility]\par \par [facility]\par Offices:\par [address] [facility]\par [address] [address]\par [address] [facility], [address]\par \par TEL: [phone]\par FAX: [phone]

## 2022-11-28 NOTE — PHYSICAL EXAM
[General Appearance - In No Acute Distress] : no acute distress [Abdomen Soft] : soft [Abdomen Tenderness] : non-tender [] : no respiratory distress [Oriented To Time, Place, And Person] : oriented to person, place, and time

## 2022-11-28 NOTE — ASSESSMENT
[FreeTextEntry1] : Reviewed outside records on [facility]. \par PSA: 4.4(9/26/22). \par \par Benign Prostatic Hyperplasia: \par PVR: 203 cc(In Uroflo incorrect entry). \par Discussed treatment options mainly: continued medical management with alpha blocker and or 5 alpha reductase inhibitor Vs Clean intermittent catheterization/Indwelling Goldstein catheter Vs Surgery: Transurethral resection/vaporization/ablation of Prostate and Simple prostatectomy: open Vs robotic after appropriate work up.\par Also discussed emerging minimally invasive surgical therapies: Prostatic urethral lift (Urolift) and Water vapor thermal therapy (Rezum). \par Discussed risks and benefits of each. \par Patient will consider his options. \par Will continue Silodosin and Finasteride. \par \par Elevated PSA:\par PSA trended down: 4.4. Was 6.43 in June 2022. \par Considering Patient on Finasteride, corrected PSA: 8.8. \par Continue PSA monitoring.\par \par Return to office in 6 months or sooner if any issues: will do Uroflo/PVR.

## 2022-11-28 NOTE — HISTORY OF PRESENT ILLNESS
[FreeTextEntry1] : 73 yo male presents follow up \par On Silodosin and Finasteride. \par Stopped Dutasteride. [place] urinating better on Finasteride. \par Today off urination for Uroflo. \par Still has split stream, starts forceful and then slows down, daytime frequency 10 to 12 x, nocturia 4 x. \par Has urinary incontinence day and night. \par Denies dysuria, hematuria, lower abdominal or flank pain, nausea, vomiting, fever, chills or rigors. \par \par ELZBIETA: Aug 2022.\par \par MRI Prostate(6/29/22):\par PSA: 6.43(6/16/2022) on Dutasteride. \par Prostate volume: 70 cc. \par No MRI suspicious lesion. PIRADS 2. \par \par Seen on 5/20/22 to discuss medication. \par Saw Dr Campbell discussed options. \par On Avodart and Silodosin. Had off and on split stream, daytime frequency every 2 hours, nocturia 1 x. \par Complained of lower back tightness, hamstring pain and is unsteady to walk. \par On Statin. \par \par MRI Prostate(12/21/2018): \par PSA: 9.6 \par Prostate volume- 90 cc\par PIRADS 2 lesion. \par \par Initially seen for Elevated PSA. \par Has history of Elevated PSA, status post negative prostate biopsy and has had MRI Prostate. \par Was following with Dr Flores. Had seen me in the past at [facility]. \par Denied any recent unintentional weight loss, night sweats and new bone or back pain.\par Family history of Prostate cancer- no. \par Reported variable stream, urinates every 2-3 hours or so during the day. Nocturia of 2-3 x. \par Endorsed off and on hesitancy and sense of incomplete emptying. Has occasional urinary incontinence at night. \par Denied dysuria, hematuria, lower abdominal or flank pain, fever, chills or rigors.\par Has Erectile dysfunction. Not sexually active.\par \par With Flomax- passed out. Was prescribed Alfuzosin, had not tried. Has Cardiac issues. \par \par

## 2022-12-08 ENCOUNTER — APPOINTMENT (OUTPATIENT)
Dept: THORACIC SURGERY | Facility: CLINIC | Age: 72
End: 2022-12-08

## 2022-12-08 VITALS
DIASTOLIC BLOOD PRESSURE: 90 MMHG | BODY MASS INDEX: 26.48 KG/M2 | OXYGEN SATURATION: 98 % | RESPIRATION RATE: 16 BRPM | HEART RATE: 78 BPM | WEIGHT: 185 LBS | HEIGHT: 70 IN | SYSTOLIC BLOOD PRESSURE: 159 MMHG

## 2022-12-08 DIAGNOSIS — J98.6 DISORDERS OF DIAPHRAGM: ICD-10-CM

## 2022-12-08 PROCEDURE — 99204 OFFICE O/P NEW MOD 45 MIN: CPT

## 2022-12-09 PROBLEM — J98.6 ELEVATED HEMIDIAPHRAGM: Status: ACTIVE | Noted: 2022-12-09

## 2022-12-09 NOTE — REVIEW OF SYSTEMS
[Heartburn] : heartburn [Negative] : Heme/Lymph [Feeling Poorly] : not feeling poorly [Feeling Tired] : not feeling tired [Chest Pain] : no chest pain [Palpitations] : no palpitations [Lower Ext Edema] : no extremity edema [Shortness Of Breath] : no shortness of breath [Cough] : no cough [SOB on Exertion] : no shortness of breath during exertion

## 2022-12-09 NOTE — HISTORY OF PRESENT ILLNESS
[FreeTextEntry1] : Beni Luo is a 72 year old male who presents for consultation, referred by Dr. Levin, for an elevated diaphragm.\par \par Past medical history includes BPH, elevated PSA, GERD, HTN, hypercholesterolemia, \par \par Today he reports feeling overall well. He is denies any cough, shortness of breath, fever, chills, unintentional weight loss/gain, and night sweats.\par

## 2022-12-09 NOTE — ASSESSMENT
[FreeTextEntry1] : Mr Luo reports to the office today to discuss recent imaging consistent with a mild elevated hemidiaphragm. He is asymptomatic at this time. Given his lack of symptoms no surgical intervention is required. Should he become short of breath or develop symptoms he should return to care for possible intervention. \par \par PLAN:\par - Return to care as needed\par \par \par \par \par \par \par IClemente NP am scribing for and in the presence of Dr. Stout the following sections HISTORY OF PRESENT ILLNESS, PAST MEDICAL/FAMILY/SOCIAL HISTORY; REVIEW OF SYSTEMS; VITAL SIGNS; PHYSICAL EXAM; DISPOSITION.\par \par "I personally performed the services described in the documentation, reviewed the documentation recorded by the scribe in my presence and accurately and completely records my words and actions."\par

## 2022-12-09 NOTE — PHYSICAL EXAM
[General Appearance - Well Nourished] : well nourished [General Appearance - Well Developed] : well developed [Sclera] : the sclera and conjunctiva were normal [Outer Ear] : the ears and nose were normal in appearance [Neck Appearance] : the appearance of the neck was normal [Respiration, Rhythm And Depth] : normal respiratory rhythm and effort [Auscultation Breath Sounds / Voice Sounds] : lungs were clear to auscultation bilaterally [Heart Rate And Rhythm] : heart rate was normal and rhythm regular [Abnormal Walk] : normal gait [Skin Color & Pigmentation] : normal skin color and pigmentation [Sensation] : the sensory exam was normal to light touch and pinprick [Motor Exam] : the motor exam was normal [Oriented To Time, Place, And Person] : oriented to person, place, and time [Impaired Insight] : insight and judgment were intact

## 2023-01-31 ENCOUNTER — APPOINTMENT (OUTPATIENT)
Dept: UROLOGY | Facility: CLINIC | Age: 73
End: 2023-01-31
Payer: MEDICARE

## 2023-01-31 VITALS
WEIGHT: 185 LBS | TEMPERATURE: 93.1 F | HEIGHT: 70 IN | DIASTOLIC BLOOD PRESSURE: 91 MMHG | SYSTOLIC BLOOD PRESSURE: 141 MMHG | BODY MASS INDEX: 26.48 KG/M2

## 2023-01-31 PROCEDURE — 99214 OFFICE O/P EST MOD 30 MIN: CPT

## 2023-01-31 NOTE — ED ADULT TRIAGE NOTE - IDEAL BODY WEIGHT(KG)
Iram Bowser                [address] [facility] [address]      2/10/2023    Dear Iram:     We have made several attempts to contact you and been unsuccessful in reaching you.  Please call our office at [phone] to follow up with Dr. Aguilar.     Thank you for allowing us to partner in your care.  Any questions/concerns, please contact our office.  If you have chosen another physician/provider follow up with your care, please contact our office with that information as well.        Sincerely,     [facility]  [facility] [address]  [facility] [id number]  
73

## 2023-02-01 LAB
APPEARANCE: CLEAR
BACTERIA: NEGATIVE
BILIRUBIN URINE: NEGATIVE
BLOOD URINE: NEGATIVE
COLOR: NORMAL
GLUCOSE QUALITATIVE U: NEGATIVE
HYALINE CASTS: 0 /LPF
KETONES URINE: NEGATIVE
LEUKOCYTE ESTERASE URINE: NEGATIVE
MICROSCOPIC-UA: NORMAL
NITRITE URINE: NEGATIVE
PH URINE: 7
PROTEIN URINE: NEGATIVE
RED BLOOD CELLS URINE: 1 /HPF
SPECIFIC GRAVITY URINE: 1.01
SQUAMOUS EPITHELIAL CELLS: 0 /HPF
UROBILINOGEN URINE: NORMAL
WHITE BLOOD CELLS URINE: 0 /HPF

## 2023-02-02 ENCOUNTER — TRANSCRIPTION ENCOUNTER (OUTPATIENT)
Age: 73
End: 2023-02-02

## 2023-02-02 LAB — BACTERIA UR CULT: NORMAL

## 2023-02-12 NOTE — LETTER BODY
[Dear  ___] : Dear  [unfilled], [Please see my note below.] : Please see my note below. [Sincerely,] : Sincerely, [FreeTextEntry3] : Roman Sanabria MD\par  of Urology\par [facility]\par \par [facility]\par Offices:\par [address] [facility], [facility]\par [address] [address]\par [address] [facility], [address]\par \par TEL: [phone]\par FAX: [phone]

## 2023-02-12 NOTE — ASSESSMENT
[FreeTextEntry1] : Reviewed records including on [employer]. \par Discussed labs and imaging. \par \par Benign Prostatic Hyperplasia:\par Discussed treatment options mainly: continued medical management with alpha blocker and or 5 alpha reductase inhibitor Vs Clean intermittent catheterization/Indwelling Goldstein catheter Vs Surgery: Transurethral resection/vaporization/ablation of Prostate and Simple prostatectomy: open Vs robotic after appropriate work up.\par Also discussed emerging minimally invasive surgical therapies: Prostatic urethral lift (Urolift) and Water vapor thermal therapy (Rezum). \par Discussed risks and benefits of each. \par Prostate volume: 70 cc on MRI Prostate(6/29/22). \par Patient wants to proceed with Transurethral vaporization of prostate using green light laser. \par Recommended Cystoscopy. Patient agreeable. \par Will get Urinalysis and Urine culture.\par Continue Silodosin and Finasteride. \par \par Elevated PSA:\par PSA: 3.9; Free% 21(1/4/23). \par Considering Patient on Finasteride, corrected PSA: 7.8. Trended down. \par Continue PSA monitoring. \par \par Return to clinic for next available Cystoscopy.

## 2023-02-12 NOTE — HISTORY OF PRESENT ILLNESS
[FreeTextEntry1] : 71 yo male presents follow up. \par On Silodosin and Finasteride.\par Reports slower stream, has more bladder spams and more urinary incontinence.\par Taking Finasteride and Silodosin. \par Had PSA few weeks ago: 3.9. \par \par ELZBIETA: Aug 2022.\par \par MRI Prostate(6/29/22):\par PSA: 6.43(6/16/2022) on Dutasteride. \par Prostate volume: 70 cc. \par No MRI suspicious lesion. PIRADS 2. \par \par Seen on 5/20/22 to discuss medication. \par Saw Dr Campbell discussed options. \par On Avodart and Silodosin. Had off and on split stream, daytime frequency every 2 hours, nocturia 1 x. \par Complained of lower back tightness, hamstring pain and is unsteady to walk. \par On Statin. \par \par MRI Prostate(12/21/2018): \par PSA: 9.6 \par Prostate volume: 90 cc\par PIRADS 2 lesion. \par \par Initially seen for Elevated PSA. \par Has history of Elevated PSA, status post negative prostate biopsy and has had MRI Prostate. \par Was following with Dr Crum and [place]. Had seen me in the past at [facility]. \par Denied any recent unintentional weight loss, night sweats and new bone or back pain.\par Family history of Prostate cancer- no. \par Reported variable stream, urinates every 2-3 hours or so during the day. Nocturia of 2-3 x. \par Endorsed off and on hesitancy and sense of incomplete emptying. Has occasional urinary incontinence at night. \par Denied dysuria, hematuria, lower abdominal or flank pain, fever, chills or rigors.\par Has Erectile dysfunction. Not sexually active.\par \par With Flomax- passed out. Was prescribed Alfuzosin, had not tried. Has Cardiac issues. \par \par

## 2023-02-15 ENCOUNTER — OFFICE (OUTPATIENT)
Dept: URBAN - METROPOLITAN AREA CLINIC 113 | Facility: CLINIC | Age: 73
Setting detail: OPHTHALMOLOGY
End: 2023-02-15
Payer: COMMERCIAL

## 2023-02-15 DIAGNOSIS — H44.23: ICD-10-CM

## 2023-02-15 DIAGNOSIS — H35.033: ICD-10-CM

## 2023-02-15 DIAGNOSIS — H25.13: ICD-10-CM

## 2023-02-15 PROBLEM — H44.2 MYOPIC DEGENERATION: Status: ACTIVE | Noted: 2023-02-15

## 2023-02-15 PROBLEM — H40.033 NARROW ANGLE GLAUCOMA SUSPECT; RIGHT EYE, LEFT EYE, BOTH EYES: Status: ACTIVE | Noted: 2023-02-15

## 2023-02-15 PROBLEM — H40.031 NARROW ANGLE GLAUCOMA SUSPECT; RIGHT EYE, LEFT EYE, BOTH EYES: Status: ACTIVE | Noted: 2023-02-15

## 2023-02-15 PROBLEM — H40.032 NARROW ANGLE GLAUCOMA SUSPECT; RIGHT EYE, LEFT EYE, BOTH EYES: Status: ACTIVE | Noted: 2023-02-15

## 2023-02-15 PROCEDURE — 92250 FUNDUS PHOTOGRAPHY W/I&R: CPT | Performed by: OPHTHALMOLOGY

## 2023-02-15 PROCEDURE — 99204 OFFICE O/P NEW MOD 45 MIN: CPT | Performed by: OPHTHALMOLOGY

## 2023-02-15 ASSESSMENT — AXIALLENGTH_DERIVED
OS_AL: 29.0192
OD_AL: 26.0536
OS_AL: 27.6836
OD_AL: 27.2982

## 2023-02-15 ASSESSMENT — REFRACTION_CURRENTRX
OS_AXIS: 026
OD_ADD: +2.50
OD_OVR_VA: 20/
OD_AXIS: 127
OD_VPRISM_DIRECTION: PROGS
OS_OVR_VA: 20/
OD_ADD: +1.00
OS_ADD: +2.50
OS_ADD: +1.25
OD_CYLINDER: -1.50
OS_CYLINDER: -0.75
OS_SPHERE: -8.00
OD_OVR_VA: 20/
OS_CYLINDER: -1.25
OS_OVR_VA: 20/
OD_CYLINDER: SPHERE
OD_SPHERE: -5.25
OS_SPHERE: -8.50
OS_VPRISM_DIRECTION: PROGS
OS_AXIS: 041
OD_SPHERE: -5.25

## 2023-02-15 ASSESSMENT — CONFRONTATIONAL VISUAL FIELD TEST (CVF)
OD_FINDINGS: FULL
OS_FINDINGS: FULL

## 2023-02-15 ASSESSMENT — KERATOMETRY
OD_K1POWER_DIOPTERS: 42.50
OD_K2POWER_DIOPTERS: 43.50
OD_AXISANGLE_DEGREES: 032
OS_K2POWER_DIOPTERS: 43.25
OS_K1POWER_DIOPTERS: 42.00
OS_AXISANGLE_DEGREES: 136

## 2023-02-15 ASSESSMENT — REFRACTION_AUTOREFRACTION
OD_AXIS: 106
OD_CYLINDER: -2.00
OS_CYLINDER: -2.50
OD_SPHERE: -6.75
OS_SPHERE: -9.25
OS_AXIS: 053

## 2023-02-15 ASSESSMENT — TONOMETRY
OS_IOP_MMHG: 18
OD_IOP_MMHG: 17

## 2023-02-15 ASSESSMENT — REFRACTION_MANIFEST
OD_VA1: 20/20
OS_CYLINDER: -1.25
OD_CYLINDER: -1.00
OS_ADD: +2.50
OS_SPHERE: -7.50
OS_AXIS: 045
OS_VA2: 20/20
OD_SPHERE: -4.75
OS_VA1: 20/20
OD_AXIS: 125
OD_ADD: +2.50
OD_VA2: 20/20

## 2023-02-15 ASSESSMENT — VISUAL ACUITY
OD_BCVA: 20/60+1
OS_BCVA: 20/30-1

## 2023-02-15 ASSESSMENT — SPHEQUIV_DERIVED
OD_SPHEQUIV: -7.75
OS_SPHEQUIV: -8.125
OD_SPHEQUIV: -5.25
OS_SPHEQUIV: -10.5

## 2023-03-07 ENCOUNTER — APPOINTMENT (OUTPATIENT)
Dept: UROLOGY | Facility: CLINIC | Age: 73
End: 2023-03-07
Payer: MEDICARE

## 2023-03-07 VITALS
BODY MASS INDEX: 26.48 KG/M2 | WEIGHT: 185 LBS | HEIGHT: 70 IN | TEMPERATURE: 97.3 F | DIASTOLIC BLOOD PRESSURE: 78 MMHG | SYSTOLIC BLOOD PRESSURE: 171 MMHG

## 2023-03-07 PROCEDURE — 99214 OFFICE O/P EST MOD 30 MIN: CPT | Mod: 25

## 2023-03-07 PROCEDURE — 52000 CYSTOURETHROSCOPY: CPT

## 2023-03-07 NOTE — ASSESSMENT
[FreeTextEntry1] : Benign Prostatic Hyperplasia:\par \par Discussed Cystoscopy findings:  \par The bladder wall demonstrates a grade 3 trabeculation. diverticula were present in the bladder wall, and were located on the posterior bladder wall . \par Trilobar prostatic enlargement with moderately enlarged lateral lobes with coaptation and a medium median lobe. \par Prostate volume: 70 cc on MRI Prostate(6/29/22). \par \par Discussed treatment options. Patient wants to proceed with Transurethral vaporization of prostate using green light laser.\par Discussed the procedure, risks and benefits and the post operative course. \par Discussed I am doing the procedures at [facility]. \par Discussed Patient will need pre-surgical appointment at [facility] and medical clearance from Primary care physician before the procedure. \par Discussed that Anesthesia team will discuss the risks and complications of the anesthesia in detail separately. \par Will schedule for next available.

## 2023-03-07 NOTE — LETTER BODY
[Dear  ___] : Dear  [unfilled], [Courtesy Letter:] : I had the pleasure of seeing your patient, [unfilled], in my office today. [Please see my note below.] : Please see my note below. [Sincerely,] : Sincerely, [FreeTextEntry3] : Roman Sanabria MD\par  of Urology\par [facility] [employer] [facility]\par \par [facility]\par Offices:\par [address], [facility]\par [address] [address]\par [address] [facility], [address]\par \par TEL: [phone]\par FAX: [phone]

## 2023-03-07 NOTE — HISTORY OF PRESENT ILLNESS
[FreeTextEntry1] : 73 yo male presents follow up. \par On Silodosin and Finasteride.\par Reports slower stream, has more bladder spams and more urinary incontinence.\par Daytime frequency every 2 hours, nocturia 1 x. \par Denies dysuria, hematuria, lower abdominal or flank pain, nausea, vomiting, fever, chills or rigors. \par \par ELZBIETA: Aug 2022.\par \par PSA: 3.9; Free% 21(1/4/23). \par \par MRI Prostate(6/29/22):\par PSA: 6.43(6/16/2022) on Dutasteride. \par Prostate volume: 70 cc. \par No MRI suspicious lesion. PIRADS 2. \par \par Seen on 5/20/22 to discuss medication. \par Saw Dr Campbell discussed options. \par On Avodart and Silodosin. Had off and on split stream, daytime frequency every 2 hours, nocturia 1 x. \par Complained of lower back tightness, hamstring pain and is unsteady to walk. \par On Statin. \par \par MRI Prostate(12/21/2018): \par PSA: 9.6 \par Prostate volume: 90 cc\par PIRADS 2 lesion. \par \par Initially seen for Elevated PSA. \par Has history of Elevated PSA, status post negative prostate biopsy and has had MRI Prostate. \par Was following with Dr Flores. Had seen me in the past at [facility]. \par Denied any recent unintentional weight loss, night sweats and new bone or back pain.\par Family history of Prostate cancer- no. \par Reported variable stream, urinates every 2-3 hours or so during the day. Nocturia of 2-3 x. \par Endorsed off and on hesitancy and sense of incomplete emptying. Has occasional urinary incontinence at night. \par Denied dysuria, hematuria, lower abdominal or flank pain, fever, chills or rigors.\par Has Erectile dysfunction. Not sexually active.\par \par With Flomax- passed out. Was prescribed Alfuzosin, had not tried. Has Cardiac issues. \par \par

## 2023-03-17 ENCOUNTER — RESULT REVIEW (OUTPATIENT)
Age: 73
End: 2023-03-17

## 2023-03-17 ENCOUNTER — OUTPATIENT (OUTPATIENT)
Dept: OUTPATIENT SERVICES | Facility: HOSPITAL | Age: 73
LOS: 1 days | End: 2023-03-17
Payer: MEDICARE

## 2023-03-17 VITALS
RESPIRATION RATE: 16 BRPM | DIASTOLIC BLOOD PRESSURE: 85 MMHG | TEMPERATURE: 98 F | OXYGEN SATURATION: 100 % | SYSTOLIC BLOOD PRESSURE: 158 MMHG | WEIGHT: 184.97 LBS | HEIGHT: 70 IN | HEART RATE: 72 BPM

## 2023-03-17 DIAGNOSIS — Z01.818 ENCOUNTER FOR OTHER PREPROCEDURAL EXAMINATION: ICD-10-CM

## 2023-03-17 DIAGNOSIS — Z98.890 OTHER SPECIFIED POSTPROCEDURAL STATES: Chronic | ICD-10-CM

## 2023-03-17 DIAGNOSIS — Z98.1 ARTHRODESIS STATUS: Chronic | ICD-10-CM

## 2023-03-17 DIAGNOSIS — Z90.89 ACQUIRED ABSENCE OF OTHER ORGANS: Chronic | ICD-10-CM

## 2023-03-17 DIAGNOSIS — N40.1 BENIGN PROSTATIC HYPERPLASIA WITH LOWER URINARY TRACT SYMPTOMS: ICD-10-CM

## 2023-03-17 LAB
ANION GAP SERPL CALC-SCNC: 3 MMOL/L — LOW (ref 5–17)
APPEARANCE UR: CLEAR — SIGNIFICANT CHANGE UP
APTT BLD: 29.5 SEC — SIGNIFICANT CHANGE UP (ref 27.5–35.5)
BASOPHILS # BLD AUTO: 0.03 K/UL — SIGNIFICANT CHANGE UP (ref 0–0.2)
BASOPHILS NFR BLD AUTO: 0.4 % — SIGNIFICANT CHANGE UP (ref 0–2)
BILIRUB UR-MCNC: NEGATIVE — SIGNIFICANT CHANGE UP
BLD GP AB SCN SERPL QL: SIGNIFICANT CHANGE UP
BUN SERPL-MCNC: 16 MG/DL — SIGNIFICANT CHANGE UP (ref 7–23)
CALCIUM SERPL-MCNC: 9.1 MG/DL — SIGNIFICANT CHANGE UP (ref 8.5–10.1)
CHLORIDE SERPL-SCNC: 107 MMOL/L — SIGNIFICANT CHANGE UP (ref 96–108)
CO2 SERPL-SCNC: 30 MMOL/L — SIGNIFICANT CHANGE UP (ref 22–31)
COLOR SPEC: YELLOW — SIGNIFICANT CHANGE UP
CREAT SERPL-MCNC: 0.87 MG/DL — SIGNIFICANT CHANGE UP (ref 0.5–1.3)
DIFF PNL FLD: NEGATIVE — SIGNIFICANT CHANGE UP
EGFR: 91 ML/MIN/1.73M2 — SIGNIFICANT CHANGE UP
EOSINOPHIL # BLD AUTO: 0.2 K/UL — SIGNIFICANT CHANGE UP (ref 0–0.5)
EOSINOPHIL NFR BLD AUTO: 2.6 % — SIGNIFICANT CHANGE UP (ref 0–6)
GLUCOSE SERPL-MCNC: 117 MG/DL — HIGH (ref 70–99)
GLUCOSE UR QL: NEGATIVE — SIGNIFICANT CHANGE UP
HCT VFR BLD CALC: 40.5 % — SIGNIFICANT CHANGE UP (ref 39–50)
HGB BLD-MCNC: 13.2 G/DL — SIGNIFICANT CHANGE UP (ref 13–17)
IMM GRANULOCYTES NFR BLD AUTO: 0.3 % — SIGNIFICANT CHANGE UP (ref 0–0.9)
INR BLD: 1.1 RATIO — SIGNIFICANT CHANGE UP (ref 0.88–1.16)
KETONES UR-MCNC: NEGATIVE — SIGNIFICANT CHANGE UP
LEUKOCYTE ESTERASE UR-ACNC: NEGATIVE — SIGNIFICANT CHANGE UP
LYMPHOCYTES # BLD AUTO: 1.38 K/UL — SIGNIFICANT CHANGE UP (ref 1–3.3)
LYMPHOCYTES # BLD AUTO: 18.3 % — SIGNIFICANT CHANGE UP (ref 13–44)
MCHC RBC-ENTMCNC: 30.5 PG — SIGNIFICANT CHANGE UP (ref 27–34)
MCHC RBC-ENTMCNC: 32.6 GM/DL — SIGNIFICANT CHANGE UP (ref 32–36)
MCV RBC AUTO: 93.5 FL — SIGNIFICANT CHANGE UP (ref 80–100)
MONOCYTES # BLD AUTO: 0.77 K/UL — SIGNIFICANT CHANGE UP (ref 0–0.9)
MONOCYTES NFR BLD AUTO: 10.2 % — SIGNIFICANT CHANGE UP (ref 2–14)
NEUTROPHILS # BLD AUTO: 5.16 K/UL — SIGNIFICANT CHANGE UP (ref 1.8–7.4)
NEUTROPHILS NFR BLD AUTO: 68.2 % — SIGNIFICANT CHANGE UP (ref 43–77)
NITRITE UR-MCNC: NEGATIVE — SIGNIFICANT CHANGE UP
PH UR: 7 — SIGNIFICANT CHANGE UP (ref 5–8)
PLATELET # BLD AUTO: 251 K/UL — SIGNIFICANT CHANGE UP (ref 150–400)
POTASSIUM SERPL-MCNC: 4 MMOL/L — SIGNIFICANT CHANGE UP (ref 3.5–5.3)
POTASSIUM SERPL-SCNC: 4 MMOL/L — SIGNIFICANT CHANGE UP (ref 3.5–5.3)
PROT UR-MCNC: NEGATIVE — SIGNIFICANT CHANGE UP
PROTHROM AB SERPL-ACNC: 12.8 SEC — SIGNIFICANT CHANGE UP (ref 10.5–13.4)
RBC # BLD: 4.33 M/UL — SIGNIFICANT CHANGE UP (ref 4.2–5.8)
RBC # FLD: 12.8 % — SIGNIFICANT CHANGE UP (ref 10.3–14.5)
SODIUM SERPL-SCNC: 140 MMOL/L — SIGNIFICANT CHANGE UP (ref 135–145)
SP GR SPEC: 1.01 — SIGNIFICANT CHANGE UP (ref 1.01–1.02)
UROBILINOGEN FLD QL: NEGATIVE — SIGNIFICANT CHANGE UP
WBC # BLD: 7.56 K/UL — SIGNIFICANT CHANGE UP (ref 3.8–10.5)
WBC # FLD AUTO: 7.56 K/UL — SIGNIFICANT CHANGE UP (ref 3.8–10.5)

## 2023-03-17 PROCEDURE — 85730 THROMBOPLASTIN TIME PARTIAL: CPT

## 2023-03-17 PROCEDURE — 86901 BLOOD TYPING SEROLOGIC RH(D): CPT

## 2023-03-17 PROCEDURE — 86900 BLOOD TYPING SEROLOGIC ABO: CPT

## 2023-03-17 PROCEDURE — 87086 URINE CULTURE/COLONY COUNT: CPT

## 2023-03-17 PROCEDURE — 93005 ELECTROCARDIOGRAM TRACING: CPT

## 2023-03-17 PROCEDURE — 81003 URINALYSIS AUTO W/O SCOPE: CPT

## 2023-03-17 PROCEDURE — 36415 COLL VENOUS BLD VENIPUNCTURE: CPT

## 2023-03-17 PROCEDURE — 99214 OFFICE O/P EST MOD 30 MIN: CPT | Mod: 25

## 2023-03-17 PROCEDURE — 85610 PROTHROMBIN TIME: CPT

## 2023-03-17 PROCEDURE — 71046 X-RAY EXAM CHEST 2 VIEWS: CPT

## 2023-03-17 PROCEDURE — 85025 COMPLETE CBC W/AUTO DIFF WBC: CPT

## 2023-03-17 PROCEDURE — 86850 RBC ANTIBODY SCREEN: CPT

## 2023-03-17 PROCEDURE — 71046 X-RAY EXAM CHEST 2 VIEWS: CPT | Mod: 26

## 2023-03-17 PROCEDURE — 93010 ELECTROCARDIOGRAM REPORT: CPT

## 2023-03-17 PROCEDURE — 80048 BASIC METABOLIC PNL TOTAL CA: CPT

## 2023-03-17 RX ORDER — ALFUZOSIN HYDROCHLORIDE 10 MG/1
1 TABLET, EXTENDED RELEASE ORAL
Qty: 0 | Refills: 0 | DISCHARGE

## 2023-03-17 RX ORDER — PANTOPRAZOLE SODIUM 20 MG/1
1 TABLET, DELAYED RELEASE ORAL
Qty: 0 | Refills: 0 | DISCHARGE

## 2023-03-17 NOTE — H&P PST ADULT - NSICDXPASTSURGICALHX_GEN_ALL_CORE_FT
PAST SURGICAL HISTORY:  History of cardiac cath     History of fusion of cervical spine     History of tonsillectomy     S/P cholecystectomy     S/P inguinal hernia repair

## 2023-03-17 NOTE — H&P PST ADULT - NSICDXPROCEDURE_GEN_ALL_CORE_FT
PROCEDURES:  Vaporization, prostate, transurethral, using laser 17-Mar-2023 12:01:02  Brittany Mcguire

## 2023-03-17 NOTE — H&P PST ADULT - ASSESSMENT
72 year old male diagnosed with BPH c/o nocturia; Pt said he sets alarm every hour at night to void; takes finasteride and Silodosin denies dysuria or hematuria; he presents to PST for planned transurethral vaporization of prostate with green-light laser        Plan:  1. PST instructions given ; NPO status/  instructions to be given by ASU   2. Pt instructed to take following meds on day of surgery: amlodipine metoprolol  and aspirin ( as instructed by cardiologist)    3. Pt instructed to take routine evening medications unless indicated   4. Stop NSAIDS ( Aspirin Alev Motrin Mobic Diclofenac), herbal supplements , MVI , Vitamin fish oil 7 days prior to surgery  unless   directed by surgeon or cardiologist;   5. Medical Optimization  with Dr Renetta Levin and Dr Merlos cardio   6. EZ wash instructions given & mupirocin instructions given  7. Labs EKG CXR as per surgeon request   8. Pt denies covid symptoms shortness of breath fever cough

## 2023-03-17 NOTE — H&P PST ADULT - HISTORY OF PRESENT ILLNESS
72 year old male diagnosed with BPH c/o nocturia; Pt said he sets alarm every hour at night to void; takes finasteride and Silodosin denies dysuria or hematuria; he presents to PST for planned transurethral vaporization of prostate with green-light laser

## 2023-03-17 NOTE — H&P PST ADULT - NSICDXPASTMEDICALHX_GEN_ALL_CORE_FT
PAST MEDICAL HISTORY:  BPH (Benign Prostatic Hyperplasia)     CAD (coronary artery disease)     Cataract     Cervical herniated disc     Elevated hemidiaphragm     GERD (gastroesophageal reflux disease)     HTN (hypertension)     Hypercholesterolemia      PAST MEDICAL HISTORY:  BPH (Benign Prostatic Hyperplasia)     CAD (coronary artery disease)     Cataract     Cervical herniated disc     Constipation     Elevated hemidiaphragm     GERD (gastroesophageal reflux disease)     HTN (hypertension)     Hypercholesterolemia

## 2023-03-18 DIAGNOSIS — Z01.818 ENCOUNTER FOR OTHER PREPROCEDURAL EXAMINATION: ICD-10-CM

## 2023-03-18 DIAGNOSIS — N40.1 BENIGN PROSTATIC HYPERPLASIA WITH LOWER URINARY TRACT SYMPTOMS: ICD-10-CM

## 2023-03-18 LAB
CULTURE RESULTS: SIGNIFICANT CHANGE UP
SPECIMEN SOURCE: SIGNIFICANT CHANGE UP

## 2023-03-27 ENCOUNTER — APPOINTMENT (OUTPATIENT)
Dept: UROLOGY | Facility: HOSPITAL | Age: 73
End: 2023-03-27

## 2023-04-03 ENCOUNTER — APPOINTMENT (OUTPATIENT)
Dept: UROLOGY | Facility: HOSPITAL | Age: 73
End: 2023-04-03

## 2023-04-03 ENCOUNTER — TRANSCRIPTION ENCOUNTER (OUTPATIENT)
Age: 73
End: 2023-04-03

## 2023-04-03 ENCOUNTER — OUTPATIENT (OUTPATIENT)
Dept: INPATIENT UNIT | Facility: HOSPITAL | Age: 73
LOS: 1 days | Discharge: HOME | End: 2023-04-03
Payer: MEDICARE

## 2023-04-03 VITALS
TEMPERATURE: 97 F | OXYGEN SATURATION: 100 % | HEIGHT: 70 IN | DIASTOLIC BLOOD PRESSURE: 87 MMHG | RESPIRATION RATE: 16 BRPM | HEART RATE: 77 BPM | SYSTOLIC BLOOD PRESSURE: 159 MMHG | WEIGHT: 184.97 LBS

## 2023-04-03 VITALS
OXYGEN SATURATION: 100 % | SYSTOLIC BLOOD PRESSURE: 126 MMHG | DIASTOLIC BLOOD PRESSURE: 70 MMHG | TEMPERATURE: 97 F | RESPIRATION RATE: 16 BRPM | HEART RATE: 67 BPM

## 2023-04-03 DIAGNOSIS — Z90.89 ACQUIRED ABSENCE OF OTHER ORGANS: Chronic | ICD-10-CM

## 2023-04-03 DIAGNOSIS — N40.1 BENIGN PROSTATIC HYPERPLASIA WITH LOWER URINARY TRACT SYMPTOMS: ICD-10-CM

## 2023-04-03 DIAGNOSIS — Z98.890 OTHER SPECIFIED POSTPROCEDURAL STATES: Chronic | ICD-10-CM

## 2023-04-03 DIAGNOSIS — Z98.1 ARTHRODESIS STATUS: Chronic | ICD-10-CM

## 2023-04-03 PROBLEM — M50.20 OTHER CERVICAL DISC DISPLACEMENT, UNSPECIFIED CERVICAL REGION: Chronic | Status: ACTIVE | Noted: 2023-03-17

## 2023-04-03 PROBLEM — H26.9 UNSPECIFIED CATARACT: Chronic | Status: ACTIVE | Noted: 2023-03-17

## 2023-04-03 PROBLEM — K59.00 CONSTIPATION, UNSPECIFIED: Chronic | Status: ACTIVE | Noted: 2023-03-17

## 2023-04-03 PROBLEM — I25.10 ATHEROSCLEROTIC HEART DISEASE OF NATIVE CORONARY ARTERY WITHOUT ANGINA PECTORIS: Chronic | Status: ACTIVE | Noted: 2023-03-17

## 2023-04-03 PROBLEM — J98.6 DISORDERS OF DIAPHRAGM: Chronic | Status: ACTIVE | Noted: 2023-03-17

## 2023-04-03 PROCEDURE — 52648 LASER SURGERY OF PROSTATE: CPT

## 2023-04-03 PROCEDURE — C1889: CPT

## 2023-04-03 RX ORDER — HYDROMORPHONE HYDROCHLORIDE 2 MG/ML
0.5 INJECTION INTRAMUSCULAR; INTRAVENOUS; SUBCUTANEOUS
Refills: 0 | Status: DISCONTINUED | OUTPATIENT
Start: 2023-04-03 | End: 2023-04-03

## 2023-04-03 RX ORDER — FENTANYL CITRATE 50 UG/ML
25 INJECTION INTRAVENOUS
Refills: 0 | Status: DISCONTINUED | OUTPATIENT
Start: 2023-04-03 | End: 2023-04-03

## 2023-04-03 RX ORDER — SILODOSIN 4 MG/1
1 CAPSULE ORAL
Qty: 0 | Refills: 0 | DISCHARGE

## 2023-04-03 RX ORDER — PHENAZOPYRIDINE HCL 100 MG
1 TABLET ORAL
Qty: 9 | Refills: 1
Start: 2023-04-03 | End: 2023-04-08

## 2023-04-03 RX ORDER — SODIUM CHLORIDE 9 MG/ML
1000 INJECTION, SOLUTION INTRAVENOUS
Refills: 0 | Status: DISCONTINUED | OUTPATIENT
Start: 2023-04-03 | End: 2023-04-03

## 2023-04-03 RX ORDER — ONDANSETRON 8 MG/1
4 TABLET, FILM COATED ORAL ONCE
Refills: 0 | Status: DISCONTINUED | OUTPATIENT
Start: 2023-04-03 | End: 2023-04-03

## 2023-04-03 RX ORDER — ACETAMINOPHEN 500 MG
1000 TABLET ORAL ONCE
Refills: 0 | Status: DISCONTINUED | OUTPATIENT
Start: 2023-04-03 | End: 2023-04-03

## 2023-04-03 RX ORDER — PHENAZOPYRIDINE HCL 100 MG
200 TABLET ORAL ONCE
Refills: 0 | Status: COMPLETED | OUTPATIENT
Start: 2023-04-03 | End: 2023-04-03

## 2023-04-03 RX ORDER — OXYCODONE HYDROCHLORIDE 5 MG/1
5 TABLET ORAL ONCE
Refills: 0 | Status: DISCONTINUED | OUTPATIENT
Start: 2023-04-03 | End: 2023-04-03

## 2023-04-03 RX ADMIN — Medication 200 MILLIGRAM(S): at 09:29

## 2023-04-03 NOTE — ASU DISCHARGE PLAN (ADULT/PEDIATRIC) - CARE PROVIDER_API CALL
Roman Sanabria)  Urology  [address], [address] [place] [address]  Phone: [phone]  Fax: [phone]  Scheduled Appointment: 04/07/2023 10:20 AM

## 2023-04-03 NOTE — ASU PATIENT PROFILE, ADULT - NSICDXPASTMEDICALHX_GEN_ALL_CORE_FT
PAST MEDICAL HISTORY:  BPH (Benign Prostatic Hyperplasia)     CAD (coronary artery disease)     Cataract     Cervical herniated disc     Constipation     Elevated hemidiaphragm     GERD (gastroesophageal reflux disease)     HTN (hypertension)     Hypercholesterolemia

## 2023-04-03 NOTE — BRIEF OPERATIVE NOTE - NSICDXBRIEFPOSTOP_GEN_ALL_CORE_FT
POST-OP DIAGNOSIS:  BPH with obstruction/lower urinary tract symptoms 03-Apr-2023 09:11:08  Roman Sanabria

## 2023-04-03 NOTE — BRIEF OPERATIVE NOTE - NSICDXBRIEFPROCEDURE_GEN_ALL_CORE_FT
PROCEDURES:  Transurethral vaporization of prostate using green light laser 03-Apr-2023 09:11:33  Roman Sanabria

## 2023-04-03 NOTE — BRIEF OPERATIVE NOTE - NSICDXBRIEFPREOP_GEN_ALL_CORE_FT
PRE-OP DIAGNOSIS:  BPH with obstruction/lower urinary tract symptoms 03-Apr-2023 09:11:00  Roman Sanabria S

## 2023-04-03 NOTE — BRIEF OPERATIVE NOTE - OPERATION/FINDINGS
1. Trilobar prostatic enlargement  2. Laser time: 33 minutes and 14 seconds   3. Energy used: 219,140 Joules

## 2023-04-06 DIAGNOSIS — N04.1 NEPHROTIC SYNDROME WITH FOCAL AND SEGMENTAL GLOMERULAR LESIONS: ICD-10-CM

## 2023-04-06 DIAGNOSIS — R35.1 NOCTURIA: ICD-10-CM

## 2023-04-06 DIAGNOSIS — Z91.013 ALLERGY TO SEAFOOD: ICD-10-CM

## 2023-04-06 DIAGNOSIS — K21.9 GASTRO-ESOPHAGEAL REFLUX DISEASE WITHOUT ESOPHAGITIS: ICD-10-CM

## 2023-04-06 DIAGNOSIS — Z88.1 ALLERGY STATUS TO OTHER ANTIBIOTIC AGENTS STATUS: ICD-10-CM

## 2023-04-06 DIAGNOSIS — N40.1 BENIGN PROSTATIC HYPERPLASIA WITH LOWER URINARY TRACT SYMPTOMS: ICD-10-CM

## 2023-04-06 DIAGNOSIS — I10 ESSENTIAL (PRIMARY) HYPERTENSION: ICD-10-CM

## 2023-04-06 DIAGNOSIS — I25.10 ATHEROSCLEROTIC HEART DISEASE OF NATIVE CORONARY ARTERY WITHOUT ANGINA PECTORIS: ICD-10-CM

## 2023-04-06 DIAGNOSIS — E78.00 PURE HYPERCHOLESTEROLEMIA, UNSPECIFIED: ICD-10-CM

## 2023-04-06 DIAGNOSIS — Z88.0 ALLERGY STATUS TO PENICILLIN: ICD-10-CM

## 2023-04-06 DIAGNOSIS — Z79.01 LONG TERM (CURRENT) USE OF ANTICOAGULANTS: ICD-10-CM

## 2023-04-06 DIAGNOSIS — Z88.8 ALLERGY STATUS TO OTHER DRUGS, MEDICAMENTS AND BIOLOGICAL SUBSTANCES: ICD-10-CM

## 2023-04-07 ENCOUNTER — APPOINTMENT (OUTPATIENT)
Dept: UROLOGY | Facility: CLINIC | Age: 73
End: 2023-04-07
Payer: MEDICARE

## 2023-04-07 VITALS
DIASTOLIC BLOOD PRESSURE: 81 MMHG | OXYGEN SATURATION: 97 % | HEIGHT: 70 IN | WEIGHT: 185 LBS | BODY MASS INDEX: 26.48 KG/M2 | HEART RATE: 90 BPM | SYSTOLIC BLOOD PRESSURE: 150 MMHG

## 2023-04-07 DIAGNOSIS — R33.9 RETENTION OF URINE, UNSPECIFIED: ICD-10-CM

## 2023-04-07 PROCEDURE — 51700 IRRIGATION OF BLADDER: CPT | Mod: 58

## 2023-04-07 PROCEDURE — A4216: CPT | Mod: NC

## 2023-04-07 PROCEDURE — 99024 POSTOP FOLLOW-UP VISIT: CPT

## 2023-04-13 ENCOUNTER — RX RENEWAL (OUTPATIENT)
Age: 73
End: 2023-04-13

## 2023-04-21 ENCOUNTER — APPOINTMENT (OUTPATIENT)
Dept: UROLOGY | Facility: CLINIC | Age: 73
End: 2023-04-21
Payer: MEDICARE

## 2023-04-21 ENCOUNTER — INPATIENT (INPATIENT)
Facility: HOSPITAL | Age: 73
LOS: 2 days | Discharge: HOME | DRG: 696 | End: 2023-04-24
Attending: HOSPITALIST | Admitting: HOSPITALIST
Payer: MEDICARE

## 2023-04-21 VITALS — WEIGHT: 184.97 LBS | HEIGHT: 70 IN

## 2023-04-21 DIAGNOSIS — Z98.1 ARTHRODESIS STATUS: Chronic | ICD-10-CM

## 2023-04-21 DIAGNOSIS — R31.9 HEMATURIA, UNSPECIFIED: ICD-10-CM

## 2023-04-21 DIAGNOSIS — Z98.890 OTHER SPECIFIED POSTPROCEDURAL STATES: Chronic | ICD-10-CM

## 2023-04-21 DIAGNOSIS — Z90.89 ACQUIRED ABSENCE OF OTHER ORGANS: Chronic | ICD-10-CM

## 2023-04-21 LAB
ALBUMIN SERPL ELPH-MCNC: 3.3 G/DL — SIGNIFICANT CHANGE UP (ref 3.3–5)
ALP SERPL-CCNC: 94 U/L — SIGNIFICANT CHANGE UP (ref 40–120)
ALT FLD-CCNC: 22 U/L — SIGNIFICANT CHANGE UP (ref 12–78)
ANION GAP SERPL CALC-SCNC: 3 MMOL/L — LOW (ref 5–17)
APPEARANCE UR: ABNORMAL
APTT BLD: 27.8 SEC — SIGNIFICANT CHANGE UP (ref 27.5–35.5)
AST SERPL-CCNC: 13 U/L — LOW (ref 15–37)
BACTERIA # UR AUTO: ABNORMAL
BASOPHILS # BLD AUTO: 0.05 K/UL — SIGNIFICANT CHANGE UP (ref 0–0.2)
BASOPHILS NFR BLD AUTO: 0.5 % — SIGNIFICANT CHANGE UP (ref 0–2)
BILIRUB SERPL-MCNC: 0.8 MG/DL — SIGNIFICANT CHANGE UP (ref 0.2–1.2)
BILIRUB UR-MCNC: ABNORMAL
BLD GP AB SCN SERPL QL: SIGNIFICANT CHANGE UP
BUN SERPL-MCNC: 22 MG/DL — SIGNIFICANT CHANGE UP (ref 7–23)
CALCIUM SERPL-MCNC: 8.7 MG/DL — SIGNIFICANT CHANGE UP (ref 8.5–10.1)
CHLORIDE SERPL-SCNC: 106 MMOL/L — SIGNIFICANT CHANGE UP (ref 96–108)
CO2 SERPL-SCNC: 25 MMOL/L — SIGNIFICANT CHANGE UP (ref 22–31)
COLOR SPEC: ABNORMAL
CREAT SERPL-MCNC: 1 MG/DL — SIGNIFICANT CHANGE UP (ref 0.5–1.3)
DIFF PNL FLD: ABNORMAL
EGFR: 79 ML/MIN/1.73M2 — SIGNIFICANT CHANGE UP
EOSINOPHIL # BLD AUTO: 0.05 K/UL — SIGNIFICANT CHANGE UP (ref 0–0.5)
EOSINOPHIL NFR BLD AUTO: 0.5 % — SIGNIFICANT CHANGE UP (ref 0–6)
EPI CELLS # UR: ABNORMAL
GLUCOSE SERPL-MCNC: 112 MG/DL — HIGH (ref 70–99)
GLUCOSE UR QL: NEGATIVE — SIGNIFICANT CHANGE UP
HCT VFR BLD CALC: 36.8 % — LOW (ref 39–50)
HGB BLD-MCNC: 11.9 G/DL — LOW (ref 13–17)
IMM GRANULOCYTES NFR BLD AUTO: 0.6 % — SIGNIFICANT CHANGE UP (ref 0–0.9)
INR BLD: 1.18 RATIO — HIGH (ref 0.88–1.16)
KETONES UR-MCNC: ABNORMAL
LACTATE SERPL-SCNC: 0.7 MMOL/L — SIGNIFICANT CHANGE UP (ref 0.7–2)
LEUKOCYTE ESTERASE UR-ACNC: NEGATIVE — SIGNIFICANT CHANGE UP
LYMPHOCYTES # BLD AUTO: 0.83 K/UL — LOW (ref 1–3.3)
LYMPHOCYTES # BLD AUTO: 7.8 % — LOW (ref 13–44)
MCHC RBC-ENTMCNC: 29.9 PG — SIGNIFICANT CHANGE UP (ref 27–34)
MCHC RBC-ENTMCNC: 32.3 GM/DL — SIGNIFICANT CHANGE UP (ref 32–36)
MCV RBC AUTO: 92.5 FL — SIGNIFICANT CHANGE UP (ref 80–100)
MONOCYTES # BLD AUTO: 0.77 K/UL — SIGNIFICANT CHANGE UP (ref 0–0.9)
MONOCYTES NFR BLD AUTO: 7.2 % — SIGNIFICANT CHANGE UP (ref 2–14)
NEUTROPHILS # BLD AUTO: 8.92 K/UL — HIGH (ref 1.8–7.4)
NEUTROPHILS NFR BLD AUTO: 83.4 % — HIGH (ref 43–77)
NITRITE UR-MCNC: NEGATIVE — SIGNIFICANT CHANGE UP
PH UR: 7 — SIGNIFICANT CHANGE UP (ref 5–8)
PLATELET # BLD AUTO: 328 K/UL — SIGNIFICANT CHANGE UP (ref 150–400)
POTASSIUM SERPL-MCNC: 4.1 MMOL/L — SIGNIFICANT CHANGE UP (ref 3.5–5.3)
POTASSIUM SERPL-SCNC: 4.1 MMOL/L — SIGNIFICANT CHANGE UP (ref 3.5–5.3)
PROT SERPL-MCNC: 6.6 GM/DL — SIGNIFICANT CHANGE UP (ref 6–8.3)
PROT UR-MCNC: 500 MG/DL
PROTHROM AB SERPL-ACNC: 13.7 SEC — HIGH (ref 10.5–13.4)
RBC # BLD: 3.98 M/UL — LOW (ref 4.2–5.8)
RBC # FLD: 12.5 % — SIGNIFICANT CHANGE UP (ref 10.3–14.5)
RBC CASTS # UR COMP ASSIST: >50 /HPF (ref 0–4)
SODIUM SERPL-SCNC: 134 MMOL/L — LOW (ref 135–145)
SP GR SPEC: 1.01 — SIGNIFICANT CHANGE UP (ref 1.01–1.02)
UROBILINOGEN FLD QL: NEGATIVE — SIGNIFICANT CHANGE UP
WBC # BLD: 10.68 K/UL — HIGH (ref 3.8–10.5)
WBC # FLD AUTO: 10.68 K/UL — HIGH (ref 3.8–10.5)
WBC UR QL: >50 /HPF (ref 0–5)

## 2023-04-21 PROCEDURE — 80053 COMPREHEN METABOLIC PANEL: CPT

## 2023-04-21 PROCEDURE — 51700 IRRIGATION OF BLADDER: CPT | Mod: 58

## 2023-04-21 PROCEDURE — 93005 ELECTROCARDIOGRAM TRACING: CPT

## 2023-04-21 PROCEDURE — 99223 1ST HOSP IP/OBS HIGH 75: CPT

## 2023-04-21 PROCEDURE — 36415 COLL VENOUS BLD VENIPUNCTURE: CPT

## 2023-04-21 PROCEDURE — 83735 ASSAY OF MAGNESIUM: CPT

## 2023-04-21 PROCEDURE — 83605 ASSAY OF LACTIC ACID: CPT

## 2023-04-21 PROCEDURE — 71045 X-RAY EXAM CHEST 1 VIEW: CPT

## 2023-04-21 PROCEDURE — 99285 EMERGENCY DEPT VISIT HI MDM: CPT | Mod: 25

## 2023-04-21 PROCEDURE — 71045 X-RAY EXAM CHEST 1 VIEW: CPT | Mod: 26

## 2023-04-21 PROCEDURE — 51705 CHANGE OF BLADDER TUBE: CPT

## 2023-04-21 PROCEDURE — A4216: CPT | Mod: NC

## 2023-04-21 PROCEDURE — 87040 BLOOD CULTURE FOR BACTERIA: CPT

## 2023-04-21 PROCEDURE — 85027 COMPLETE CBC AUTOMATED: CPT

## 2023-04-21 PROCEDURE — 84100 ASSAY OF PHOSPHORUS: CPT

## 2023-04-21 PROCEDURE — 80048 BASIC METABOLIC PNL TOTAL CA: CPT

## 2023-04-21 RX ORDER — ACETAMINOPHEN 500 MG
650 TABLET ORAL EVERY 6 HOURS
Refills: 0 | Status: DISCONTINUED | OUTPATIENT
Start: 2023-04-21 | End: 2023-04-24

## 2023-04-21 RX ORDER — ACETAMINOPHEN 500 MG
650 TABLET ORAL EVERY 6 HOURS
Refills: 0 | Status: DISCONTINUED | OUTPATIENT
Start: 2023-04-21 | End: 2023-04-22

## 2023-04-21 RX ORDER — ATORVASTATIN CALCIUM 80 MG/1
1 TABLET, FILM COATED ORAL
Refills: 0 | DISCHARGE

## 2023-04-21 RX ORDER — ASPIRIN/CALCIUM CARB/MAGNESIUM 324 MG
81 TABLET ORAL DAILY
Refills: 0 | Status: DISCONTINUED | OUTPATIENT
Start: 2023-04-21 | End: 2023-04-24

## 2023-04-21 RX ORDER — FINASTERIDE 5 MG/1
5 TABLET, FILM COATED ORAL DAILY
Refills: 0 | Status: DISCONTINUED | OUTPATIENT
Start: 2023-04-21 | End: 2023-04-24

## 2023-04-21 RX ORDER — ASCORBIC ACID 60 MG
0 TABLET,CHEWABLE ORAL
Qty: 0 | Refills: 0 | DISCHARGE

## 2023-04-21 RX ORDER — FINASTERIDE 5 MG/1
1 TABLET, FILM COATED ORAL
Qty: 0 | Refills: 0 | DISCHARGE

## 2023-04-21 RX ORDER — PSYLLIUM SEED (WITH DEXTROSE)
0 POWDER (GRAM) ORAL
Qty: 0 | Refills: 0 | DISCHARGE

## 2023-04-21 RX ORDER — AMLODIPINE BESYLATE 2.5 MG/1
5 TABLET ORAL DAILY
Refills: 0 | Status: DISCONTINUED | OUTPATIENT
Start: 2023-04-21 | End: 2023-04-24

## 2023-04-21 RX ORDER — LANOLIN ALCOHOL/MO/W.PET/CERES
3 CREAM (GRAM) TOPICAL AT BEDTIME
Refills: 0 | Status: DISCONTINUED | OUTPATIENT
Start: 2023-04-21 | End: 2023-04-24

## 2023-04-21 RX ORDER — ONDANSETRON 8 MG/1
4 TABLET, FILM COATED ORAL EVERY 8 HOURS
Refills: 0 | Status: DISCONTINUED | OUTPATIENT
Start: 2023-04-21 | End: 2023-04-24

## 2023-04-21 RX ORDER — ASPIRIN/CALCIUM CARB/MAGNESIUM 324 MG
1 TABLET ORAL
Refills: 0 | DISCHARGE

## 2023-04-21 RX ORDER — SODIUM CHLORIDE 9 MG/ML
1000 INJECTION INTRAMUSCULAR; INTRAVENOUS; SUBCUTANEOUS
Refills: 0 | Status: DISCONTINUED | OUTPATIENT
Start: 2023-04-21 | End: 2023-04-24

## 2023-04-21 RX ORDER — METOPROLOL TARTRATE 50 MG
50 TABLET ORAL DAILY
Refills: 0 | Status: DISCONTINUED | OUTPATIENT
Start: 2023-04-21 | End: 2023-04-24

## 2023-04-21 RX ORDER — ATORVASTATIN CALCIUM 80 MG/1
40 TABLET, FILM COATED ORAL AT BEDTIME
Refills: 0 | Status: DISCONTINUED | OUTPATIENT
Start: 2023-04-21 | End: 2023-04-24

## 2023-04-21 RX ADMIN — AMLODIPINE BESYLATE 5 MILLIGRAM(S): 2.5 TABLET ORAL at 23:02

## 2023-04-21 RX ADMIN — Medication 81 MILLIGRAM(S): at 23:02

## 2023-04-21 RX ADMIN — ATORVASTATIN CALCIUM 40 MILLIGRAM(S): 80 TABLET, FILM COATED ORAL at 23:02

## 2023-04-21 RX ADMIN — Medication 50 MILLIGRAM(S): at 23:02

## 2023-04-21 RX ADMIN — FINASTERIDE 5 MILLIGRAM(S): 5 TABLET, FILM COATED ORAL at 23:02

## 2023-04-21 NOTE — H&P ADULT - ASSESSMENT
A/P: 72 year old male with PMH  HTN, HLD, CAD x 3 stents, BPH s/p protectomy and GERD presented to  ED with complaint of blood in urine aditted for Hematuria s/p prostactomy.    [] Hematuria s/p Prostactomy/Hx of BPH  - admit to medicine  - Urology recs appreciated, CBI  - Hgb: 11.9, f/u AM labs  - f/u BCx x2 and UCx  - UA: significant for large amount of blood  - c/w Finasteride 5mg QD    [] CAD x3 stents  - c/w Aspirin 81mg QD  - c/w Atorvaststin 40mg QD    [] Hx of HTN  - c/w Amlodipine 5mg QD    [] Hx of HLD  - c/w Atorvastatin 40mg QD    DVT ppx: SCD    - Code status: full code    - d/w Dr. Pierson    A/P: 72 year old male with PMH  HTN, HLD, CAD x 3 stents, BPH s/p TURP and GERD presented to  ED with complaint of blood in urine admitted for Hematuria s/p TURP.    [] Hematuria s/p Prostactomy/Hx of BPH  - admit to medicine  - Urology recs appreciated, CBI  - Hgb: 11.9, f/u AM labs  - f/u BCx x2 and UCx  - UA: significant for large amount of blood  - c/w Finasteride 5mg QD    [] CAD x3 stents  - c/w Aspirin 81mg QD  - c/w Atorvaststin 40mg QD    [] Hx of HTN  - c/w Amlodipine 5mg QD    [] Hx of HLD  - c/w Atorvastatin 40mg QD    DVT ppx: SCD    - Code status: full code    - d/w Dr. Cordero

## 2023-04-21 NOTE — H&P ADULT - HISTORY OF PRESENT ILLNESS
72 year old male with PMH  HTN, HLD, CAD x 3 stents, BPH s/p protectomy and GERD presented to  ED with complaint of blood in urine and sent over by Dr. Sanabria Urology. Pt underwent a prostectomy on 4/3/23 with was uncomplicated. Pt states he woke up this morning and noticed blood in the bag of his sharma cath. He states he had an appt with Urology today and had the sharma irrigated but it was clotting. Pt denies dysuria, flank pain and abdominal pain at this time.     In the ED /74, Hr 99, Temp 98.4. O2 98 and RR 18. CXR negative for active chets disease. Continuous bladder irrigation started in the ED.    72 year old male with PMH  HTN, HLD, CAD x 3 stents, BPH s/p TURP and GERD presented to  ED with complaint of blood in urine and sent over by Dr. Sanabria Urology. Pt underwent TURP on 4/3/23 which was uncomplicated. Pt states he woke up this morning and noticed blood in the bag of his sharma catheter. He states he had an appt with Urology today and had the sharma irrigated but it was clotting so was sent for CBI. Pt denies dysuria, flank pain and abdominal pain at this time. Also denies chills, fevers n/v/d ha vision changes.     In the ED /72, Hr 99, Temp 98.4. O2 98 and RR 18. CXR negative for active disease. Continuous bladder irrigation started in the ED.

## 2023-04-21 NOTE — CONSULT NOTE ADULT - SUBJECTIVE AND OBJECTIVE BOX
[facility]   Urology called to see this pleasant 73 y/o male w/ a PMHx of BPH, CAD, herniated discs, GERD, HTN, and HLD,  presents to the ED sent in by Dr. Sanabria c/o bloody urine. Pt reports he is s/p Green Light prostatectomy 4/3/23 and currently has a Goldstein cath, states since 6am today he noticed blood in the bag. Pt was at Dr. Sanabria office PTA where he had it irrigated but states it kept on clotting.   Pt states he is comfortable at this time with no suprapubic discomfort     PAST MEDICAL HISTORY: BPH (Benign Prostatic Hyperplasia) , CAD (coronary artery disease) , Cataract , Cervical herniated disc , Constipation , Elevated hemidiaphragm , GERD, gastroesophageal reflux disease) , HTN (hypertension) , Hypercholesterolemia.     PAST SURGICAL HISTORY: History of cardiac cath , History of fusion of cervical spine , History of tonsillectomy , S/P cholecystectomy , S/P inguinal hernia repair.     EXAM    Vital Signs Last 24 Hrs  T(C): 36.9 (21 Apr 2023 13:10), Max: 36.9 (21 Apr 2023 13:10)  T(F): 98.4 (21 Apr 2023 13:10), Max: 98.4 (21 Apr 2023 13:10)  HR: 99 (21 Apr 2023 13:10) (99 - 99)  BP: 129/74 (21 Apr 2023 13:10) (129/74 - 129/74)  BP(mean): 87 (21 Apr 2023 13:10) (87 - 87)  RR: 18 (21 Apr 2023 13:10) (18 - 18)  SpO2: 98% (21 Apr 2023 13:10) (98% - 98%)    Parameters below as of 21 Apr 2023 13:10  Patient On (Oxygen Delivery Method): room air    GEN- WDWN male in NAD  Chest CTA  Cor RR  Abd BS + SNTND bladder not distended  - Goldstein in place draining bloody urine    LABS PENDING

## 2023-04-21 NOTE — ED ADULT TRIAGE NOTE - CHIEF COMPLAINT QUOTE
Patient comes in with bloody urine. Patient states symptoms started at 0600 this morning. Patient recently had prostate procedure 2 weeks ago. Patient currently with sharma cath. Patient sent by Dr Sanabria.

## 2023-04-21 NOTE — CONSULT NOTE ADULT - ASSESSMENT
73yo male now ~2.5 week s/p green light prostatectomy presenting to ED with Gross hematuria from existing Goldstein    Suggest:  ·	change Goldstein to at LEAST a 22 FR coude 3 way and initiate CBI  ·	Check CBC to assess stability of H and H  ·	ADMIT TO MEDICINE  ·	Titrate CBI to pink  ·	Will follow- please call  with any questions or concerns    DW Dr. Sanabria

## 2023-04-21 NOTE — ED STATDOCS - CROS ED ROS STATEMENT
Thank you for trusting us with your care!     If you need to contact us for questions about:  Symptoms, Scheduling & Medical Questions; Non-urgent (2-3 day response) Bella message, Urgent (needing response today) [phone] (if after 3:30pm next day response)   Prescriptions: Please call your Pharmacy   Billing: [facility] [phone] or [facility]:[phone]    all other ROS negative except as per HPI

## 2023-04-21 NOTE — ED STATDOCS - ATTENDING APP SHARED VISIT CONTRIBUTION OF CARE
I, Lianet Layton DO,  performed the initial face to face bedside interview with this patient regarding history of present illness, review of symptoms and relevant past medical, social and family history.  I completed an independent physical examination.  I was the initial provider who evaluated this patient.   I personally saw the patient and performed a substantive portion of the visit including all aspects of the medical decision making.  I have signed out the follow up of any pending tests (i.e. labs, radiological studies) to the ACP.  I have communicated the patient’s plan of care and disposition with the ACP.  The history, relevant review of systems, past medical and surgical history, medical decision making, and physical examination was documented by the scribe in my presence and I attest to the accuracy of the documentation.

## 2023-04-21 NOTE — H&P ADULT - ATTENDING COMMENTS
71 yo M PMH CAD HTN HLD BPH s/p TURP with post procedural sharma cath and gross hematuria starting today, sent for CBI by urology.    Hematuria, post-procedural  CBI, monitor for clots and resolution of hematuria  Probable discharge with sharma  Cont finasteride/flomax    CAD  No CP, stable, chronic  Cont ASA/Statin/Metoprolol    HTN  Cont amlodipine 5mg daily    VTE ppx  SCD

## 2023-04-21 NOTE — H&P ADULT - NSHPPHYSICALEXAM_GEN_ALL_CORE
Vital Signs Last 24 Hrs  T(C): 36.9 (21 Apr 2023 21:26), Max: 36.9 (21 Apr 2023 13:10)  T(F): 98.4 (21 Apr 2023 21:26), Max: 98.4 (21 Apr 2023 13:10)  HR: 79 (21 Apr 2023 21:26) (75 - 99)  BP: 161/82 (21 Apr 2023 21:26) (129/74 - 161/82)  BP(mean): 87 (21 Apr 2023 13:10) (87 - 87)  RR: 18 (21 Apr 2023 21:26) (16 - 18)  SpO2: 100% (21 Apr 2023 21:26) (98% - 100%)    Parameters below as of 21 Apr 2023 21:26  Patient On (Oxygen Delivery Method): room air      PHYSICAL EXAM:  Constitutional: NAD, awake and alert, well-developed  HEENT: PERR, EOMI, Normal Hearing, MMM  Neck: Soft and supple, No LAD, No JVD  Respiratory: Breath sounds are clear bilaterally, No wheezing, rales or rhonchi  Cardiovascular: S1 and S2, regular rate and rhythm, no Murmurs, gallops or rubs  Gastrointestinal: Bowel Sounds present, soft, nontender, nondistended,   : Goldstein with bloody urine  Extremities: No peripheral edema  Vascular: 2+ peripheral pulses  Neurological: A/O x 3, no focal deficits  Musculoskeletal: 5/5 strength b/l upper and lower extremities  Skin: No rashes Vital Signs Last 24 Hrs  T(C): 36.9 (21 Apr 2023 21:26), Max: 36.9 (21 Apr 2023 13:10)  T(F): 98.4 (21 Apr 2023 21:26), Max: 98.4 (21 Apr 2023 13:10)  HR: 79 (21 Apr 2023 21:26) (75 - 99)  BP: 161/82 (21 Apr 2023 21:26) (129/74 - 161/82)  BP(mean): 87 (21 Apr 2023 13:10) (87 - 87)  RR: 18 (21 Apr 2023 21:26) (16 - 18)  SpO2: 100% (21 Apr 2023 21:26) (98% - 100%)    Parameters below as of 21 Apr 2023 21:26  Patient On (Oxygen Delivery Method): room air      PHYSICAL EXAM:  Constitutional: NAD, awake and alert, well-developed  HEENT: PERR, EOMI, Normal Hearing, MMM  Neck: Soft and supple, No LAD,  Respiratory: Breath sounds are clear bilaterally, No wheezing, rales or rhonchi  Cardiovascular: S1 and S2, regular rate and rhythm, no Murmurs, gallops or rubs  Gastrointestinal: Bowel Sounds present, soft, nontender, minimal distension,   : Goldstein with bloody urine  Extremities: No peripheral edema  Vascular: 2+ peripheral pulses  Neurological: A/O x 3, no focal deficits  Musculoskeletal: 5/5 strength b/l upper and lower extremities  Skin: No rashes

## 2023-04-21 NOTE — ED ADULT NURSE REASSESSMENT NOTE - NS ED NURSE REASSESS COMMENT FT1
assumed care of pt offering no complaints, 4000 output from 3 way Goldstein, new bags spiked. pt pnd admission

## 2023-04-21 NOTE — ED STATDOCS - OBJECTIVE STATEMENT
71 y/o male w/ a PMHx of BPH, CAD, herniated discs, GERD, HTN, and HLD presents to the ED sent in by Dr. Sanabria c/o bloody urine. Pt reports he is s/p prostate procedure x2 weeks ago and currently has a Goldstein cath, states since 6am today he noticed blood in the bag. Pt was at Dr. Sanabria office PTA where he had it irrigated but states it kept on clotting.

## 2023-04-21 NOTE — ED STATDOCS - PROGRESS NOTE DETAILS
71 yo male with a PMH of CAD, stents, only on ASA daily, BPH recent green light laser procedure with Dr. Sanabria on 4/3 presents with hematuria. Spoke with surgery PA who will come to evaluate pt. -Madhu Salas PA-C 71 yo male with a PMH of CAD, stents, only on ASA daily, BPH recent green light laser procedure with Dr. Sanabria on 4/3 presents with hematuria. Pt was fine after the surgery until this morning when he tried to urinate and noticed dark red blood come out with clots. Pt went to the office, had a sharma placed and they flushed it but it continue to drain red blood. Denies fever, abd pain, n/v/d.    Leg bag with red blood. Will place CBI and reeval. -Madhu Salas PA-C Surgery came to see pt. According to Dr. Sanabria, wants pt to be admitted and CBI overnight. UA with >50wbc but negative leuk and negative nitrites. Spoke with admitting doctor Dr. Keith. Does not need abx at this time. -Madhu Salas PA-C

## 2023-04-21 NOTE — H&P ADULT - NSHPLABSRESULTS_GEN_ALL_CORE
< from: Xray Chest 1 View-PORTABLE IMMEDIATE (Xray Chest 1 View-PORTABLE IMMEDIATE .) (04.21.23 @ 16:43) >    IMPRESSION: Chronic elevation of RIGHT hemidiaphragm.  No radiographic evidence of active chest disease.  Lateral radiograph recommended to evaluate posterior lung bases.    < end of copied text >                          11.9   10.68 )-----------( 328      ( 21 Apr 2023 14:59 )             36.8   04-21    134<L>  |  106  |  22  ----------------------------<  112<H>  4.1   |  25  |  1.00    Ca    8.7      21 Apr 2023 14:59    TPro  6.6  /  Alb  3.3  /  TBili  0.8  /  DBili  x   /  AST  13<L>  /  ALT  22  /  AlkPhos  94  04-21

## 2023-04-21 NOTE — H&P ADULT - NSHPREVIEWOFSYSTEMS_GEN_ALL_CORE
Discharge instructions given and explained to pt and she verbalized her understanding. Pt's 2 friends that brought her to the ED  are here to drive pt home.   
Pt awake, alert and talkative. Smiling noted. Pt states she feels fine at this time.   
REVIEW OF SYSTEMS:  CONSTITUTIONAL: No weakness, fevers or chills  EYES/ENT: No visual changes;  No vertigo or throat pain   NECK: No pain or stiffness  RESPIRATORY: No cough, wheezing, hemoptysis; No shortness of breath  CARDIOVASCULAR: No chest pain or palpitations  GASTROINTESTINAL: No abdominal or epigastric pain. No nausea, vomiting, or hematemesis; No diarrhea or constipation.   GENITOURINARY: No dysuria, frequency, + hematuria  NEUROLOGICAL: No numbness or weakness  SKIN: No itching, burning, rashes, or lesions   All other review of systems is negative unless indicated above

## 2023-04-21 NOTE — ED STATDOCS - CLINICAL SUMMARY MEDICAL DECISION MAKING FREE TEXT BOX
73 y/o male w/ recent prostate procedure w/ hematuria. Will get basic labs, CBI, and reeval. 71 y/o male s/p recent procedure w/ hematuria. Will get basic labs, CBI, and reeval.

## 2023-04-22 DIAGNOSIS — R31.0 GROSS HEMATURIA: ICD-10-CM

## 2023-04-22 LAB
ANION GAP SERPL CALC-SCNC: 2 MMOL/L — LOW (ref 5–17)
BUN SERPL-MCNC: 16 MG/DL — SIGNIFICANT CHANGE UP (ref 7–23)
CALCIUM SERPL-MCNC: 9 MG/DL — SIGNIFICANT CHANGE UP (ref 8.5–10.1)
CHLORIDE SERPL-SCNC: 111 MMOL/L — HIGH (ref 96–108)
CO2 SERPL-SCNC: 28 MMOL/L — SIGNIFICANT CHANGE UP (ref 22–31)
CREAT SERPL-MCNC: 0.76 MG/DL — SIGNIFICANT CHANGE UP (ref 0.5–1.3)
EGFR: 94 ML/MIN/1.73M2 — SIGNIFICANT CHANGE UP
GLUCOSE SERPL-MCNC: 138 MG/DL — HIGH (ref 70–99)
HCT VFR BLD CALC: 36.3 % — LOW (ref 39–50)
HGB BLD-MCNC: 12 G/DL — LOW (ref 13–17)
MCHC RBC-ENTMCNC: 30.6 PG — SIGNIFICANT CHANGE UP (ref 27–34)
MCHC RBC-ENTMCNC: 33.1 GM/DL — SIGNIFICANT CHANGE UP (ref 32–36)
MCV RBC AUTO: 92.6 FL — SIGNIFICANT CHANGE UP (ref 80–100)
PLATELET # BLD AUTO: 311 K/UL — SIGNIFICANT CHANGE UP (ref 150–400)
POTASSIUM SERPL-MCNC: 4 MMOL/L — SIGNIFICANT CHANGE UP (ref 3.5–5.3)
POTASSIUM SERPL-SCNC: 4 MMOL/L — SIGNIFICANT CHANGE UP (ref 3.5–5.3)
RBC # BLD: 3.92 M/UL — LOW (ref 4.2–5.8)
RBC # FLD: 12.9 % — SIGNIFICANT CHANGE UP (ref 10.3–14.5)
SODIUM SERPL-SCNC: 141 MMOL/L — SIGNIFICANT CHANGE UP (ref 135–145)
WBC # BLD: 9.2 K/UL — SIGNIFICANT CHANGE UP (ref 3.8–10.5)
WBC # FLD AUTO: 9.2 K/UL — SIGNIFICANT CHANGE UP (ref 3.8–10.5)

## 2023-04-22 PROCEDURE — 93010 ELECTROCARDIOGRAM REPORT: CPT

## 2023-04-22 PROCEDURE — 99233 SBSQ HOSP IP/OBS HIGH 50: CPT | Mod: GC

## 2023-04-22 RX ORDER — CIPROFLOXACIN LACTATE 400MG/40ML
500 VIAL (ML) INTRAVENOUS EVERY 12 HOURS
Refills: 0 | Status: DISCONTINUED | OUTPATIENT
Start: 2023-04-22 | End: 2023-04-24

## 2023-04-22 RX ADMIN — SODIUM CHLORIDE 75 MILLILITER(S): 9 INJECTION INTRAMUSCULAR; INTRAVENOUS; SUBCUTANEOUS at 00:01

## 2023-04-22 RX ADMIN — Medication 500 MILLIGRAM(S): at 21:57

## 2023-04-22 RX ADMIN — FINASTERIDE 5 MILLIGRAM(S): 5 TABLET, FILM COATED ORAL at 21:57

## 2023-04-22 RX ADMIN — Medication 50 MILLIGRAM(S): at 10:36

## 2023-04-22 RX ADMIN — Medication 81 MILLIGRAM(S): at 10:35

## 2023-04-22 RX ADMIN — ATORVASTATIN CALCIUM 40 MILLIGRAM(S): 80 TABLET, FILM COATED ORAL at 21:57

## 2023-04-22 RX ADMIN — SODIUM CHLORIDE 75 MILLILITER(S): 9 INJECTION INTRAMUSCULAR; INTRAVENOUS; SUBCUTANEOUS at 12:00

## 2023-04-22 RX ADMIN — AMLODIPINE BESYLATE 5 MILLIGRAM(S): 2.5 TABLET ORAL at 10:36

## 2023-04-22 NOTE — PATIENT PROFILE ADULT - FALL HARM RISK - HARM RISK INTERVENTIONS

## 2023-04-22 NOTE — PROGRESS NOTE ADULT - ATTENDING COMMENTS
Patient seen and examined at the bedside. Please see resident note for full details regarding the service.     General: Awake and alert, cooperative with exam. No acute distress.   Cardiology: Normal S1, S2. No murmurs. RRR.   Respiratory: Lungs CTABL. No wheezes, rales, or rhonchi.   Gastrointestinal: + BS. Soft. NT. ND. No guarding, rigidity, or rebound tenderness. Goldstein catheter with bloody urine outpt.   Extremities: No peripheral edema bilaterally. 2+ dorsalis pedis pulses.   Neurological: A+Ox3. CN 2-12 intact. No FND. Normal speech. No facial droop.   Psychiatric: Normal affect. Normal mood.     Assessment:   - Gross hematuria s/p TURP   - History of HTN, HLD, CAD x 3 stents, BPH s/p TURP and GERD     Plan:   - Urology recs appreciated   - c/w CBI until urine pink   - Will likely need d/c with Goldstein   - D/C planning Patient seen and examined at the bedside. Please see resident note for full details regarding the service.     General: Awake and alert, cooperative with exam. No acute distress.   Cardiology: Normal S1, S2. No murmurs. RRR.   Respiratory: Lungs CTABL. No wheezes, rales, or rhonchi.   Gastrointestinal: + BS. Soft. NT. ND. No guarding, rigidity, or rebound tenderness. Goldstein catheter with bloody urine outpt.   Extremities: No peripheral edema bilaterally. 2+ dorsalis pedis pulses.   Neurological: A+Ox3. CN 2-12 intact. No FND. Normal speech. No facial droop.   Psychiatric: Normal affect. Normal mood.     Assessment:   - Gross hematuria s/p green light prostatectomy   - History of HTN, HLD, CAD x 3 stents, BPH s/p green light prostatectomy and GERD     Plan:   - Urology recs appreciated   - c/w CBI until urine pink   - Will likely need d/c with Goldstein   - D/C planning

## 2023-04-22 NOTE — PROGRESS NOTE ADULT - ASSESSMENT
72 year old male with PMH  HTN, HLD, CAD x 3 stents, BPH s/p green light protatectomy and GERD presented to  ED with complaint of blood in urine admitted for Hematuria s/p green light laser prostate surgery.    Hematuria s/p green light laser prostactomy/Hx of BPH  - Urology recs appreciated, CBI  - HGB holding c/w monitoring   - f/u BCx x2 and UCx  - UA: significant for large amount of blood  - c/w CBI - if not improving have to call Urology  - c/w Finasteride 5mg QD    CAD x3 stents  - c/w Aspirin 81mg QD  - c/w Atorvaststin 40mg QD  - metoprolol XR 50 mg QD     Hx of HTN  - c/w Amlodipine 5mg QD    Hx of HLD  - c/w Atorvastatin 40mg QD    DVT ppx: SCD    Code status: full code    case d/w Dr. Livingston   72 year old male with PMH  HTN, HLD, CAD x 3 stents, BPH s/p green light protatectomy and GERD presented to  ED with complaint of blood in urine admitted for Hematuria s/p green light laser prostate surgery.    Hematuria s/p green light laser prostactomy/Hx of BPH  - Urology recs appreciated, CBI  - HGB holding c/w monitoring   - f/u BCx x2 and UCx  - UA: significant for large amount of blood  - c/w CBI - if not improving have to call Urology  - c/w Finasteride 5mg QD    CAD x3 stents  - c/w Aspirin 81mg QD  - c/w Atorvaststin 40mg QD  - metoprolol XR 50 mg QD     Hx of HTN  - c/w Amlodipine 5mg QD    Hx of HLD  - c/w Atorvastatin 40mg QD    Constipation   - Dulcolax PO     DVT ppx: SCD    Code status: full code    case d/w Dr. Livingston

## 2023-04-22 NOTE — PROGRESS NOTE ADULT - ASSESSMENT
H and H stable.   Urine clearing up on CBI.     Maintain Continuous bladder irrigation.   Titrate to light pink.   If patient leaks around catheter, clots in drainage tube, catheter not draining or if Patient complains of abdominal pain. Manually irrigate with 60 ml NS until light pink fluid return with no clots.     Clamp CBI in the morning. If does well will do trial of void on 4/24.   Urine culture sent from office. Continue empiric Cipro.

## 2023-04-23 LAB
ALBUMIN SERPL ELPH-MCNC: 2.8 G/DL — LOW (ref 3.3–5)
ALP SERPL-CCNC: 89 U/L — SIGNIFICANT CHANGE UP (ref 40–120)
ALT FLD-CCNC: 19 U/L — SIGNIFICANT CHANGE UP (ref 12–78)
ANION GAP SERPL CALC-SCNC: 1 MMOL/L — LOW (ref 5–17)
AST SERPL-CCNC: 13 U/L — LOW (ref 15–37)
BILIRUB SERPL-MCNC: 0.7 MG/DL — SIGNIFICANT CHANGE UP (ref 0.2–1.2)
BUN SERPL-MCNC: 13 MG/DL — SIGNIFICANT CHANGE UP (ref 7–23)
CALCIUM SERPL-MCNC: 8.5 MG/DL — SIGNIFICANT CHANGE UP (ref 8.5–10.1)
CHLORIDE SERPL-SCNC: 110 MMOL/L — HIGH (ref 96–108)
CO2 SERPL-SCNC: 28 MMOL/L — SIGNIFICANT CHANGE UP (ref 22–31)
CREAT SERPL-MCNC: 0.94 MG/DL — SIGNIFICANT CHANGE UP (ref 0.5–1.3)
EGFR: 85 ML/MIN/1.73M2 — SIGNIFICANT CHANGE UP
GLUCOSE SERPL-MCNC: 135 MG/DL — HIGH (ref 70–99)
HCT VFR BLD CALC: 34.7 % — LOW (ref 39–50)
HGB BLD-MCNC: 11.3 G/DL — LOW (ref 13–17)
MAGNESIUM SERPL-MCNC: 2.1 MG/DL — SIGNIFICANT CHANGE UP (ref 1.6–2.6)
MCHC RBC-ENTMCNC: 30.4 PG — SIGNIFICANT CHANGE UP (ref 27–34)
MCHC RBC-ENTMCNC: 32.6 GM/DL — SIGNIFICANT CHANGE UP (ref 32–36)
MCV RBC AUTO: 93.3 FL — SIGNIFICANT CHANGE UP (ref 80–100)
PHOSPHATE SERPL-MCNC: 2.5 MG/DL — SIGNIFICANT CHANGE UP (ref 2.5–4.5)
PLATELET # BLD AUTO: 288 K/UL — SIGNIFICANT CHANGE UP (ref 150–400)
POTASSIUM SERPL-MCNC: 4.4 MMOL/L — SIGNIFICANT CHANGE UP (ref 3.5–5.3)
POTASSIUM SERPL-SCNC: 4.4 MMOL/L — SIGNIFICANT CHANGE UP (ref 3.5–5.3)
PROT SERPL-MCNC: 6 GM/DL — SIGNIFICANT CHANGE UP (ref 6–8.3)
RBC # BLD: 3.72 M/UL — LOW (ref 4.2–5.8)
RBC # FLD: 12.8 % — SIGNIFICANT CHANGE UP (ref 10.3–14.5)
SODIUM SERPL-SCNC: 139 MMOL/L — SIGNIFICANT CHANGE UP (ref 135–145)
WBC # BLD: 8.69 K/UL — SIGNIFICANT CHANGE UP (ref 3.8–10.5)
WBC # FLD AUTO: 8.69 K/UL — SIGNIFICANT CHANGE UP (ref 3.8–10.5)

## 2023-04-23 PROCEDURE — 99233 SBSQ HOSP IP/OBS HIGH 50: CPT | Mod: GC

## 2023-04-23 RX ORDER — POLYETHYLENE GLYCOL 3350 17 G/17G
17 POWDER, FOR SOLUTION ORAL DAILY
Refills: 0 | Status: DISCONTINUED | OUTPATIENT
Start: 2023-04-23 | End: 2023-04-24

## 2023-04-23 RX ORDER — SENNA PLUS 8.6 MG/1
2 TABLET ORAL AT BEDTIME
Refills: 0 | Status: DISCONTINUED | OUTPATIENT
Start: 2023-04-23 | End: 2023-04-24

## 2023-04-23 RX ADMIN — SENNA PLUS 2 TABLET(S): 8.6 TABLET ORAL at 21:47

## 2023-04-23 RX ADMIN — Medication 50 MILLIGRAM(S): at 11:39

## 2023-04-23 RX ADMIN — ATORVASTATIN CALCIUM 40 MILLIGRAM(S): 80 TABLET, FILM COATED ORAL at 21:47

## 2023-04-23 RX ADMIN — SODIUM CHLORIDE 75 MILLILITER(S): 9 INJECTION INTRAMUSCULAR; INTRAVENOUS; SUBCUTANEOUS at 12:54

## 2023-04-23 RX ADMIN — FINASTERIDE 5 MILLIGRAM(S): 5 TABLET, FILM COATED ORAL at 21:47

## 2023-04-23 RX ADMIN — Medication 500 MILLIGRAM(S): at 21:47

## 2023-04-23 RX ADMIN — Medication 81 MILLIGRAM(S): at 11:39

## 2023-04-23 RX ADMIN — Medication 500 MILLIGRAM(S): at 11:40

## 2023-04-23 RX ADMIN — POLYETHYLENE GLYCOL 3350 17 GRAM(S): 17 POWDER, FOR SOLUTION ORAL at 11:40

## 2023-04-23 RX ADMIN — AMLODIPINE BESYLATE 5 MILLIGRAM(S): 2.5 TABLET ORAL at 11:38

## 2023-04-23 NOTE — PROGRESS NOTE ADULT - ASSESSMENT
72 year old male with PMH  HTN, HLD, CAD x 3 stents, BPH s/p green light protatectomy and GERD presented to  ED with complaint of blood in urine admitted for Hematuria s/p green light laser prostate surgery.    Hematuria s/p green light laser prostactomy/Hx of BPH  - Urology recs appreciated, CBI  - HGB holding c/w monitoring   - UA: significant for large amount of blood  - f/u BCx x2: negative growth to date  - urine culture: > 100, 000E faecalis, pt asymptomatic, currently on cipro for empiric treatment per urology    - c/w Finasteride 5mg QD  - 4/23 : per urology, stop CBI today, monitor for clots, trial of void tomorrow     CAD x3 stents  - c/w Aspirin 81mg QD  - c/w Atorvaststin 40mg QD  - metoprolol XR 50 mg QD     Hx of HTN  - c/w Amlodipine 5mg QD    Hx of HLD  - c/w Atorvastatin 40mg QD    Constipation   - Dulcolax PO     DVT ppx: SCD    Code status: full code

## 2023-04-24 ENCOUNTER — EMERGENCY (EMERGENCY)
Facility: HOSPITAL | Age: 73
LOS: 1 days | Discharge: HOME | End: 2023-04-24
Attending: STUDENT IN AN ORGANIZED HEALTH CARE EDUCATION/TRAINING PROGRAM
Payer: COMMERCIAL

## 2023-04-24 ENCOUNTER — TRANSCRIPTION ENCOUNTER (OUTPATIENT)
Age: 73
End: 2023-04-24

## 2023-04-24 VITALS
RESPIRATION RATE: 18 BRPM | TEMPERATURE: 98 F | SYSTOLIC BLOOD PRESSURE: 121 MMHG | HEART RATE: 78 BPM | OXYGEN SATURATION: 97 % | DIASTOLIC BLOOD PRESSURE: 70 MMHG

## 2023-04-24 VITALS
RESPIRATION RATE: 16 BRPM | OXYGEN SATURATION: 95 % | HEART RATE: 99 BPM | SYSTOLIC BLOOD PRESSURE: 115 MMHG | HEIGHT: 70 IN | WEIGHT: 185.19 LBS | TEMPERATURE: 98 F | DIASTOLIC BLOOD PRESSURE: 72 MMHG

## 2023-04-24 DIAGNOSIS — Z90.89 ACQUIRED ABSENCE OF OTHER ORGANS: Chronic | ICD-10-CM

## 2023-04-24 DIAGNOSIS — Z98.1 ARTHRODESIS STATUS: Chronic | ICD-10-CM

## 2023-04-24 DIAGNOSIS — Z98.890 OTHER SPECIFIED POSTPROCEDURAL STATES: Chronic | ICD-10-CM

## 2023-04-24 LAB
-  AMPICILLIN: SIGNIFICANT CHANGE UP
-  CIPROFLOXACIN: SIGNIFICANT CHANGE UP
-  LEVOFLOXACIN: SIGNIFICANT CHANGE UP
-  NITROFURANTOIN: SIGNIFICANT CHANGE UP
-  TETRACYCLINE: SIGNIFICANT CHANGE UP
-  VANCOMYCIN: SIGNIFICANT CHANGE UP
ANION GAP SERPL CALC-SCNC: 1 MMOL/L — LOW (ref 5–17)
BUN SERPL-MCNC: 12 MG/DL — SIGNIFICANT CHANGE UP (ref 7–23)
CALCIUM SERPL-MCNC: 8.4 MG/DL — LOW (ref 8.5–10.1)
CHLORIDE SERPL-SCNC: 110 MMOL/L — HIGH (ref 96–108)
CO2 SERPL-SCNC: 28 MMOL/L — SIGNIFICANT CHANGE UP (ref 22–31)
CREAT SERPL-MCNC: 0.88 MG/DL — SIGNIFICANT CHANGE UP (ref 0.5–1.3)
CULTURE RESULTS: SIGNIFICANT CHANGE UP
EGFR: 90 ML/MIN/1.73M2 — SIGNIFICANT CHANGE UP
GLUCOSE SERPL-MCNC: 120 MG/DL — HIGH (ref 70–99)
HCT VFR BLD CALC: 33.9 % — LOW (ref 39–50)
HGB BLD-MCNC: 10.9 G/DL — LOW (ref 13–17)
MCHC RBC-ENTMCNC: 30.4 PG — SIGNIFICANT CHANGE UP (ref 27–34)
MCHC RBC-ENTMCNC: 32.2 GM/DL — SIGNIFICANT CHANGE UP (ref 32–36)
MCV RBC AUTO: 94.7 FL — SIGNIFICANT CHANGE UP (ref 80–100)
METHOD TYPE: SIGNIFICANT CHANGE UP
ORGANISM # SPEC MICROSCOPIC CNT: SIGNIFICANT CHANGE UP
ORGANISM # SPEC MICROSCOPIC CNT: SIGNIFICANT CHANGE UP
PLATELET # BLD AUTO: 272 K/UL — SIGNIFICANT CHANGE UP (ref 150–400)
POTASSIUM SERPL-MCNC: 4 MMOL/L — SIGNIFICANT CHANGE UP (ref 3.5–5.3)
POTASSIUM SERPL-SCNC: 4 MMOL/L — SIGNIFICANT CHANGE UP (ref 3.5–5.3)
RBC # BLD: 3.58 M/UL — LOW (ref 4.2–5.8)
RBC # FLD: 12.9 % — SIGNIFICANT CHANGE UP (ref 10.3–14.5)
SODIUM SERPL-SCNC: 139 MMOL/L — SIGNIFICANT CHANGE UP (ref 135–145)
SPECIMEN SOURCE: SIGNIFICANT CHANGE UP
WBC # BLD: 9.04 K/UL — SIGNIFICANT CHANGE UP (ref 3.8–10.5)
WBC # FLD AUTO: 9.04 K/UL — SIGNIFICANT CHANGE UP (ref 3.8–10.5)

## 2023-04-24 PROCEDURE — 99239 HOSP IP/OBS DSCHRG MGMT >30: CPT | Mod: GC

## 2023-04-24 PROCEDURE — 99283 EMERGENCY DEPT VISIT LOW MDM: CPT

## 2023-04-24 RX ORDER — POLYETHYLENE GLYCOL 3350 17 G/17G
17 POWDER, FOR SOLUTION ORAL
Qty: 170 | Refills: 0
Start: 2023-04-24 | End: 2023-05-03

## 2023-04-24 RX ORDER — LEVOFLOXACIN 5 MG/ML
1 INJECTION, SOLUTION INTRAVENOUS
Qty: 5 | Refills: 0
Start: 2023-04-24 | End: 2023-04-28

## 2023-04-24 RX ADMIN — AMLODIPINE BESYLATE 5 MILLIGRAM(S): 2.5 TABLET ORAL at 10:27

## 2023-04-24 RX ADMIN — Medication 5 MILLIGRAM(S): at 14:25

## 2023-04-24 RX ADMIN — POLYETHYLENE GLYCOL 3350 17 GRAM(S): 17 POWDER, FOR SOLUTION ORAL at 10:27

## 2023-04-24 RX ADMIN — Medication 50 MILLIGRAM(S): at 10:27

## 2023-04-24 RX ADMIN — Medication 500 MILLIGRAM(S): at 10:27

## 2023-04-24 RX ADMIN — SODIUM CHLORIDE 75 MILLILITER(S): 9 INJECTION INTRAMUSCULAR; INTRAVENOUS; SUBCUTANEOUS at 10:57

## 2023-04-24 RX ADMIN — Medication 81 MILLIGRAM(S): at 10:27

## 2023-04-24 NOTE — PROGRESS NOTE ADULT - SUBJECTIVE AND OBJECTIVE BOX
Pt has been seen and examined with FP resident, resident supervised agree with a/p       Patient is a 72y old  Male who presents with a chief complaint of Gross Hematuria (24 Apr 2023 14:36)      HPI:  72 year old male with PMH  HTN, HLD, CAD x 3 stents, BPH s/p TURP and GERD presented to  ED with complaint of blood in urine and sent over by Dr. [facility]     (21 Apr 2023 22:45)      PHYSICAL EXAM:  Vital Signs Last 24 Hrs  T(C): 36.5 (24 Apr 2023 16:04), Max: 36.8 (23 Apr 2023 23:17)  T(F): 97.7 (24 Apr 2023 16:04), Max: 98.2 (23 Apr 2023 23:17)  HR: 78 (24 Apr 2023 16:04) (75 - 78)  BP: 121/70 (24 Apr 2023 16:04) (106/56 - 139/73)  BP(mean): --  RR: 18 (24 Apr 2023 16:04) (18 - 18)  SpO2: 97% (24 Apr 2023 16:04) (96% - 98%)    Parameters below as of 24 Apr 2023 16:04  Patient On (Oxygen Delivery Method): room air      -rs-aeeb, cta  -cvs-s1s2 normal   -p/a-soft,bs+      A/P    #d/c today with further management as an outpt, time spent 45 minutes     
Pt has been seen and examined with FP resident, resident supervised agree with a/p       Patient is a 72y old  Male who presents with a chief complaint of Gross Hematuria (22 Apr 2023 21:35)      HPI:  72 year old male with PMH  HTN, HLD, CAD x 3 stents, BPH s/p TURP and GERD presented to  ED with complaint of blood in urine and sent over by Dr. Sanabria    PHYSICAL EXAM:  Vital Signs Last 24 Hrs  T(C): 36.4 (23 Apr 2023 08:21), Max: 36.6 (22 Apr 2023 23:28)  T(F): 97.5 (23 Apr 2023 08:21), Max: 97.9 (22 Apr 2023 23:28)  HR: 73 (23 Apr 2023 08:21) (73 - 75)  BP: 136/74 (23 Apr 2023 08:21) (127/72 - 136/74)  BP(mean): --  RR: 18 (23 Apr 2023 08:21) (18 - 18)  SpO2: 96% (23 Apr 2023 08:21) (96% - 98%)    Parameters below as of 23 Apr 2023 08:21  Patient On (Oxygen Delivery Method): room air      -rs-aeeb, cta  -cvs-s1s2 normal   -p/a-soft,bs+      A/P    #ct management as per per urology team     #supportive care     #DVT pr 
Pt seen at bedside, patient reports he is feeling much better, no longer having bladder spasms. As per RN pt had to be irrigated once overnight and again later this afternoon with some clots noted upon irrigation. Pt prefers to be discharged with sharma in case he passes tiny clots again.    PE    General: No distress, No anxiety  VITALS  T(C): 36.4 (04-24-23 @ 07:24), Max: 36.8 (04-23-23 @ 23:17)  HR: 76 (04-24-23 @ 07:24) (75 - 77)  BP: 139/73 (04-24-23 @ 07:24) (106/56 - 139/73)  RR: 18 (04-24-23 @ 07:24) (17 - 18)  SpO2: 96% (04-24-23 @ 07:24) (94% - 98%)            Skin     : No jaundice   HEENT: Normocephalic, no icterus , EOM full , No epistaxis  Lung    : No resp distress  Abdo:   : Soft, Non tender, No guarding, No distension   Back    : No CVAT b/l  Genitalia Male: Sharma with pink to red tinged clear urine. Not on CBI  Neuro   : A&Ox3    LABS                        10.9   9.04  )-----------( 272      ( 24 Apr 2023 06:00 )             33.9   04-24    139  |  110<H>  |  12  ----------------------------<  120<H>  4.0   |  28  |  0.88    Ca    8.4<L>      24 Apr 2023 06:00  Phos  2.5     04-23  Mg     2.1     04-23    TPro  6.0  /  Alb  2.8<L>  /  TBili  0.7  /  DBili  x   /  AST  13<L>  /  ALT  19  /  AlkPhos  89  04-23  
72 year old male with PMH  HTN, HLD, CAD x 3 stents, BPH s/p TURP and GERD presented to  ED with complaint of blood in urine and sent over by Dr. [facility]. Pt underwent green light prostatectomy on 4/3/23 which was uncomplicated. Pt states he woke up this morning and noticed blood in the bag of his sharma catheter. He states he had an appt with Urology today and had the sharma irrigated but it was clotting so was sent for CBI. Pt denies dysuria, flank pain and abdominal pain at this time. Also denies chills, fevers n/v/d ha vision changes.     In the ED /72, Hr 99, Temp 98.4. O2 98 and RR 18. CXR negative for active disease. Continuous bladder irrigation started in the ED. Pt still on CBI but still has blood in the bag.      Subjective: Patient was seen and examined by me this morning at bedside. Urine pink in color. Spoke with urology, plan to stop CBI and trial of void tomorrow.   Pt says feeling fine, no complaints, agrees that his hematuria is getting better.     PHYSICAL EXAM:  Vital Signs Last 24 Hrs  T(C): 36.4 (23 Apr 2023 15:13), Max: 36.6 (22 Apr 2023 23:28)  T(F): 97.5 (23 Apr 2023 15:13), Max: 97.9 (22 Apr 2023 23:28)  HR: 77 (23 Apr 2023 15:13) (73 - 77)  BP: 127/72 (23 Apr 2023 15:13) (127/72 - 136/74)  BP(mean): --  RR: 17 (23 Apr 2023 15:13) (17 - 18)  SpO2: 94% (23 Apr 2023 15:13) (94% - 98%)    Parameters below as of 23 Apr 2023 15:13  Patient On (Oxygen Delivery Method): room air          Constitutional: Pt lying in bed, awake and alert, NAD  HEENT: EOMI, normal hearing, moist mucous membranes  Respiratory: CTABL, No wheezing, rales or rhonchi  Cardiovascular: S1S2+, RRR, no M/G/R  Gastrointestinal: BS+, soft, NT/ND, no guarding, no rebound  Extremities: No peripheral edema  Neurological: AAOx3, no focal deficits  Skin: No rashes  Sharma in place, urine pink, no clots appreciated.     MEDICATIONS  (STANDING):  amLODIPine   Tablet 5 milliGRAM(s) Oral daily  aspirin  chewable 81 milliGRAM(s) Oral daily  atorvastatin 40 milliGRAM(s) Oral at bedtime  ciprofloxacin     Tablet 500 milliGRAM(s) Oral every 12 hours  finasteride 5 milliGRAM(s) Oral daily  metoprolol succinate ER 50 milliGRAM(s) Oral daily  polyethylene glycol 3350 17 Gram(s) Oral daily  senna 2 Tablet(s) Oral at bedtime  sodium chloride 0.9%. 1000 milliLiter(s) (75 mL/Hr) IV Continuous <Continuous>    MEDICATIONS  (PRN):  acetaminophen     Tablet .. 650 milliGRAM(s) Oral every 6 hours PRN Temp greater or equal to 38.5C (101.3F), Mild Pain (1 - 3), Moderate Pain (4 - 6), Severe Pain (7 - 10)  aluminum hydroxide/magnesium hydroxide/simethicone Suspension 30 milliLiter(s) Oral every 4 hours PRN Dyspepsia  bisacodyl 5 milliGRAM(s) Oral every 12 hours PRN Constipation  melatonin 3 milliGRAM(s) Oral at bedtime PRN Insomnia  ondansetron Injectable 4 milliGRAM(s) IV Push every 8 hours PRN Nausea and/or Vomiting        LABS: All Labs Reviewed:                            11.3   8.69  )-----------( 288      ( 23 Apr 2023 06:49 )             34.7   04-23    139  |  110<H>  |  13  ----------------------------<  135<H>  4.4   |  28  |  0.94    Ca    8.5      23 Apr 2023 06:49  Phos  2.5     04-23  Mg     2.1     04-23    TPro  6.0  /  Alb  2.8<L>  /  TBili  0.7  /  DBili  x   /  AST  13<L>  /  ALT  19  /  AlkPhos  89  04-23      Blood Culture:   I&O's Summary    21 Apr 2023 07:01  -  22 Apr 2023 07:00  --------------------------------------------------------  IN: 0 mL / OUT: 5000 mL / NET: -5000 mL      CAPILLARY BLOOD GLUCOSE          RADIOLOGY/EKG:    < from: Xray Chest 2 Views PA/Lat (03.17.23 @ 11:42) >  Unchanged elevation right hemidiaphragm. New linear   atelectasis at the right lung base.    < end of copied text >  < from: Xray Chest 1 View-PORTABLE IMMEDIATE (Xray Chest 1 View-PORTABLE IMMEDIATE .) (04.21.23 @ 16:43) >   Chronic elevation of RIGHT hemidiaphragm.  No radiographic evidence of active chest disease.  Lateral radiograph recommended to evaluate posterior lung bases    < end of copied text >    
  Patient is a 72y old  Male who presents with a chief complaint of Gross Hematuria (2023 10:00)      Vital Signs Last 24 Hrs  T(C): 36.6 (2023 15:50), Max: 36.7 (2023 07:53)  T(F): 97.9 (2023 15:50), Max: 98.1 (2023 07:53)  HR: 72 (2023 15:50) (72 - 78)  BP: 143/75 (2023 15:50) (139/68 - 143/75)  BP(mean): --  RR: 18 (2023 15:50) (17 - 18)  SpO2: 98% (2023 15:50) (98% - 98%)    Parameters below as of 2023 15:50  Patient On (Oxygen Delivery Method): room air    AAO x 3   Normal respiratory effort  Normal peripheral circulation  Goldstein on CBI with slow drip and light pink urine      LABS:                        12.0   9.20  )-----------( 311      ( 2023 06:46 )             36.3     04-22    141  |  111<H>  |  16  ----------------------------<  138<H>  4.0   |  28  |  0.76    Ca    9.0      2023 06:46    TPro  6.6  /  Alb  3.3  /  TBili  0.8  /  DBili  x   /  AST  13<L>  /  ALT  22  /  AlkPhos  94  04-21    PT/INR - ( 2023 14:59 )   PT: 13.7 sec;   INR: 1.18 ratio         PTT - ( 2023 14:59 )  PTT:27.8 sec  Urinalysis Basic - ( 2023 15:06 )    Color: Red / Appearance: bloody / S.015 / pH: x  Gluc: x / Ketone: Trace  / Bili: Moderate / Urobili: Negative   Blood: x / Protein: 500 mg/dL / Nitrite: Negative   Leuk Esterase: Negative / RBC: >50 /HPF / WBC >50 /HPF   Sq Epi: x / Non Sq Epi: x / Bacteria: Moderate            
72 year old male with PMH  HTN, HLD, CAD x 3 stents, BPH s/p TURP and GERD presented to  ED with complaint of blood in urine and sent over by Dr. [facility]. Pt underwent green light prostatectomy on 4/3/23 which was uncomplicated. Pt states he woke up this morning and noticed blood in the bag of his sharma catheter. He states he had an appt with Urology today and had the sharma irrigated but it was clotting so was sent for CBI. Pt denies dysuria, flank pain and abdominal pain at this time. Also denies chills, fevers n/v/d ha vision changes.     In the ED /72, Hr 99, Temp 98.4. O2 98 and RR 18. CXR negative for active disease. Continuous bladder irrigation started in the ED. Pt still on CBI but still has blood in the bag.      Subjective: Patient was seen and examined by me this morning at bedside. Gross blood in the CBI bag.    REVIEW OF SYSTEMS:  CONSTITUTIONAL: No weakness, fevers or chills  EYES/ENT: No visual changes;  No vertigo or throat pain   NECK: No pain or stiffness  RESPIRATORY: No cough, wheezing, hemoptysis; No shortness of breath  CARDIOVASCULAR: No chest pain or palpitations  GASTROINTESTINAL: slightly constipated   GENITOURINARY: No dysuria, frequency or hematuria  NEUROLOGICAL: No numbness or weakness  SKIN: No itching, burning, rashes, or lesions     PHYSICAL EXAM:  Vital Signs Last 24 Hrs  T(C): 36.7 (22 Apr 2023 07:53), Max: 36.9 (21 Apr 2023 21:26)  T(F): 98.1 (22 Apr 2023 07:53), Max: 98.4 (21 Apr 2023 21:26)  HR: 78 (22 Apr 2023 07:53) (75 - 79)  BP: 139/68 (22 Apr 2023 07:53) (139/68 - 161/82)  BP(mean): --  RR: 17 (22 Apr 2023 07:53) (16 - 18)  SpO2: 98% (22 Apr 2023 07:53) (98% - 100%)    Parameters below as of 22 Apr 2023 07:53  Patient On (Oxygen Delivery Method): room air    Constitutional: Pt lying in bed, awake and alert, NAD  HEENT: EOMI, normal hearing, moist mucous membranes  Neck: Soft and supple, no JVD  Respiratory: CTABL, No wheezing, rales or rhonchi  Cardiovascular: S1S2+, RRR, no M/G/R  Gastrointestinal: BS+, soft, NT/ND, no guarding, no rebound  Extremities: No peripheral edema  Vascular: 2+ peripheral pulses  Neurological: AAOx3, no focal deficits  Musculoskeletal: 5/5 strength b/l upper and lower extremities  Skin: No rashes    MEDICATIONS:  MEDICATIONS  (STANDING):  amLODIPine   Tablet 5 milliGRAM(s) Oral daily  aspirin  chewable 81 milliGRAM(s) Oral daily  atorvastatin 40 milliGRAM(s) Oral at bedtime  ciprofloxacin     Tablet 500 milliGRAM(s) Oral every 12 hours  finasteride 5 milliGRAM(s) Oral daily  metoprolol succinate ER 50 milliGRAM(s) Oral daily  sodium chloride 0.9%. 1000 milliLiter(s) (75 mL/Hr) IV Continuous <Continuous>    MEDICATIONS  (PRN):  acetaminophen     Tablet .. 650 milliGRAM(s) Oral every 6 hours PRN Temp greater or equal to 38.5C (101.3F), Mild Pain (1 - 3), Moderate Pain (4 - 6), Severe Pain (7 - 10)  aluminum hydroxide/magnesium hydroxide/simethicone Suspension 30 milliLiter(s) Oral every 4 hours PRN Dyspepsia  bisacodyl 5 milliGRAM(s) Oral every 12 hours PRN Constipation  melatonin 3 milliGRAM(s) Oral at bedtime PRN Insomnia  ondansetron Injectable 4 milliGRAM(s) IV Push every 8 hours PRN Nausea and/or Vomiting      LABS: All Labs Reviewed:                        12.0   9.20  )-----------( 311      ( 22 Apr 2023 06:46 )             36.3     04-22    141  |  111<H>  |  16  ----------------------------<  138<H>  4.0   |  28  |  0.76    Ca    9.0      22 Apr 2023 06:46    TPro  6.6  /  Alb  3.3  /  TBili  0.8  /  DBili  x   /  AST  13<L>  /  ALT  22  /  AlkPhos  94  04-21    PT/INR - ( 21 Apr 2023 14:59 )   PT: 13.7 sec;   INR: 1.18 ratio         PTT - ( 21 Apr 2023 14:59 )  PTT:27.8 sec      Blood Culture:   I&O's Summary    21 Apr 2023 07:01  -  22 Apr 2023 07:00  --------------------------------------------------------  IN: 0 mL / OUT: 5000 mL / NET: -5000 mL      CAPILLARY BLOOD GLUCOSE          RADIOLOGY/EKG:    < from: Xray Chest 2 Views PA/Lat (03.17.23 @ 11:42) >  Unchanged elevation right hemidiaphragm. New linear   atelectasis at the right lung base.    < end of copied text >  < from: Xray Chest 1 View-PORTABLE IMMEDIATE (Xray Chest 1 View-PORTABLE IMMEDIATE .) (04.21.23 @ 16:43) >   Chronic elevation of RIGHT hemidiaphragm.  No radiographic evidence of active chest disease.  Lateral radiograph recommended to evaluate posterior lung bases    < end of copied text >

## 2023-04-24 NOTE — PROGRESS NOTE ADULT - ASSESSMENT
73 yo male admitted with gross hematuria.   S/p CBI and now off CBI with clear red to pink tinge urine but had to be irrigated with some tiny clots and prefers to be discharged with sharma to leg bag.  H and H stable.   UCx positive.  Recommend  - D/C home with sharma to leg bag and outpatient trial of void on 4/26/23   - D/C home with Levaquin   - Follow up in the office for trial of void on 4/26/23 at 10 am scheduled    Case discussed with Dr. Sanabria

## 2023-04-24 NOTE — DISCHARGE NOTE PROVIDER - HOSPITAL COURSE
72 year old male with PMH  HTN, HLD, CAD x 3 stents, BPH s/p TURP and GERD presented to  ED with complaint of blood in urine and sent over by Dr. [facility]. Pt underwent green light prostatectomy on 4/3/23 which was uncomplicated. Pt states he woke up this morning and noticed blood in the bag of his sharma catheter. He states he had an appt with Urology today and had the sharma irrigated but it was clotting so was sent for CBI. Pt denies dysuria, flank pain and abdominal pain at this time. Also denies chills, fevers n/v/d ha vision changes.     In the ED /72, Hr 99, Temp 98.4. O2 98 and RR 18. CXR negative for active disease. Continuous bladder irrigation started in the ED. Pt still on CBI but still has blood in the bag.      He was admitted for hematuria s/p green light laser prostectomy. UA: significant for large amount of blood. Urine culture positive for E. faecalis. Urology was consulted. CBI was done. Pt was given ciproflaxacin. Pt's H&H has been stable. Pt's hematuria has improved.     Pt will be discharged home with sharma to leg bag and follow up with Dr. Sanabria as outpatient trial of void on 4/26/23 10am. Pt will be discharged home with Levaquin 250mg daily for 5 days.    Pt will also need to follow up his primary care provider for further evaluation and management     72 year old male with PMH  HTN, HLD, CAD x 3 stents, BPH s/p TURP and GERD presented to  ED with complaint of blood in urine and sent over by Dr. San[facility]. Pt underwent green light prostatectomy on 4/3/23 which was uncomplicated. Pt states he woke up this morning and noticed blood in the bag of his sharma catheter. He states he had an appt with Urology today and had the sharma irrigated but it was clotting so was sent for CBI. Pt denies dysuria, flank pain and abdominal pain at this time. Also denies chills, fevers n/v/d ha vision changes.     In the ED /72, Hr 99, Temp 98.4. O2 98 and RR 18. CXR negative for active disease. Continuous bladder irrigation started in the ED. Pt still on CBI but still has blood in the bag.      He was admitted for hematuria s/p green light laser prostectomy. UA: significant for large amount of blood. Urine culture positive for E. faecalis. Urology was consulted. CBI was done. Pt was given ciproflaxacin. Pt's H&H has been stable. Pt's hematuria has improved.     Pt will be discharged home with sharma to leg bag and follow up with Dr. Sanabria as outpatient trial of void on 4/26/23 10am. Pt will be discharged home with Levaquin 250mg daily for 5 days.    Pt will also need to follow up his primary care provider for further evaluation and management    Vitals  T(F): 97.5 (04-24-23 @ 07:24), Max: 98.2 (04-23-23 @ 23:17)  HR: 76 (04-24-23 @ 07:24) (75 - 76)  BP: 139/73 (04-24-23 @ 07:24) (106/56 - 139/73)  RR: 18 (04-24-23 @ 07:24) (18 - 18)  SpO2: 96% (04-24-23 @ 07:24) (96% - 98%)    Physical Exam   Gen: NAD, comfortable  HENT: atraumatic head and ears, no gross abnormalities of ears, mucous membranes moist, no oral lesions, neck supple without masses/goiter/lymphadenopathy  CV: RRR, nl s1/s2, no M/R/G  Pulm: nl respiratory effort, CTAB, no wheezes/crackles/rhonchi  Back: no scoliosis, lordosis, or kyphosis, no tenderness  Abd: normoactive bowel sounds in all 4 quadrants, soft, nontender, nondistended, no rebound, no guarding, no masses  Extremities: no pedal edema, pedal pulses palpable   Skin: nl warm and dry, no wounds   Neuro: A&Ox3, answering questions appropriately, PERRL, EOMI, face symmetric, sensation equal bilaterally in face, tongue midline, no dysarthria, 5/5 strength in upper and lower extremities bilaterally, sensation intact in upper and lower extremities bilaterally, nl finger to nose, and nl heel to shin   Pysch: no depression, no SI, no HI

## 2023-04-24 NOTE — DISCHARGE NOTE NURSING/CASE MANAGEMENT/SOCIAL WORK - NSDCVIVACCINE_GEN_ALL_CORE_FT
Td (adult) preservative free; 10-Nov-2016 12:36; Liss Youssef (RN); [employer]; [id number]; IntraMuscular; Deltoid Left.; 0.5 milliLiter(s); VIS (VIS Published: 10-Nov-2016, VIS Presented: 10-Nov-2016);

## 2023-04-24 NOTE — DISCHARGE NOTE PROVIDER - NSDCFUSCHEDAPPT_GEN_ALL_CORE_FT
[facility]  UROLOGY [address]  Scheduled Appointment: 04/26/2023    Roman Sanabria  [facility]  UROLOGY [address]  Scheduled Appointment: 04/26/2023    [facility]  UROLOGY [address]  Scheduled Appointment: 05/23/2023    Roman Sanabria  [facility]  UROLOGY [address]  Scheduled Appointment: 05/23/2023

## 2023-04-24 NOTE — DISCHARGE NOTE PROVIDER - CARE PROVIDERS DIRECT ADDRESSES
[email or web address]

## 2023-04-24 NOTE — ED ADULT TRIAGE NOTE - CHIEF COMPLAINT QUOTE
Patient with DC today from  with catheter, was given supplies in case the catheter got blocked but was never shown how to properly irrigate the catheter and it is now blocked. Denies abdominal pain. Patient taking ABx for UTI.

## 2023-04-24 NOTE — DISCHARGE NOTE PROVIDER - NSDCCPCAREPLAN_GEN_ALL_CORE_FT
PRINCIPAL DISCHARGE DIAGNOSIS  Diagnosis: Hematuria  Assessment and Plan of Treatment: You have been having blood in your urine after your procedure in your bladder.  You were seen by urology and you were given antibiotics.  You hemoglobin level has been stable. Irrigation of your bladder shows significant improvement in your hematuria.   You will be discharged home with sharma with leg bag, and oral antibiotics: levaquin 250mg once daily for 5 days. You will need to follow up with your urologist for further evaluation and management.   You will also need to follow up with your primary care provider for your hematuria.     PRINCIPAL DISCHARGE DIAGNOSIS  Diagnosis: Hematuria  Assessment and Plan of Treatment: You have been having blood in your urine after your procedure in your bladder.  You were seen by urology and you were given antibiotics.  You hemoglobin level has been stable. Irrigation of your bladder shows significant improvement in your hematuria.   You will be discharged home with sharma with leg bag, and oral antibiotics: levaquin 250mg once daily for 5 days. You will see Urology as an outpatient for a trial of void. Until then you will have the sharma in. You will need to follow up with your urologist for further evaluation and management.   You will also need to follow up with your primary care provider for your hematuria.

## 2023-04-24 NOTE — DISCHARGE NOTE PROVIDER - CARE PROVIDER_API CALL
Sincere Levin  [facility] MEDICINE  [address] [place] [address]  Phone: [phone]  Fax: [phone]  Established Patient  Follow Up Time: 1-3 days   Sincere Levin  FAMILY MEDICINE  [address] [place] [address]  Phone: [phone]  Fax: [phone]  Established Patient  Follow Up Time: 1-3 days    Roman Sanabria)  Urology  [address] [facility], [address] [place] [address]  Phone: [phone]  Fax: [phone]  Follow Up Time: 1-3 days

## 2023-04-24 NOTE — DISCHARGE NOTE PROVIDER - NSDCMRMEDTOKEN_GEN_ALL_CORE_FT
amLODIPine 5 mg oral tablet: 1 tab(s) orally once a day  aspirin 81 mg oral tablet, chewable: 1 tab(s) chewed once a day  atorvastatin 40 mg oral tablet: 1 tab(s) orally once a day  finasteride 5 mg oral tablet: 1 tab(s) orally once a day- HS   metoprolol succinate 50 mg oral tablet, extended release: 1 tab(s) orally once a day   amLODIPine 5 mg oral tablet: 1 tab(s) orally once a day  aspirin 81 mg oral tablet, chewable: 1 tab(s) chewed once a day  atorvastatin 40 mg oral tablet: 1 tab(s) orally once a day  finasteride 5 mg oral tablet: 1 tab(s) orally once a day- HS   levoFLOXacin 250 mg oral tablet: 1 tab(s) orally once a day  metoprolol succinate 50 mg oral tablet, extended release: 1 tab(s) orally once a day  polyethylene glycol 3350 oral powder for reconstitution: 17 gram(s) orally once a day as needed for  constipation

## 2023-04-24 NOTE — DISCHARGE NOTE PROVIDER - PROVIDER TOKENS
PROVIDER:[TOKEN:[[id number]],FOLLOWUP:[1-3 days],ESTABLISHEDPATIENT:[T]] PROVIDER:[TOKEN:[[id number]],FOLLOWUP:[1-3 days],ESTABLISHEDPATIENT:[T]],PROVIDER:[TOKEN:[[id number]],FOLLOWUP:[1-3 days]]

## 2023-04-24 NOTE — DISCHARGE NOTE NURSING/CASE MANAGEMENT/SOCIAL WORK - PATIENT PORTAL LINK FT
You can access the FollowMyHealth Patient Portal offered by [facility] by registering at the following website: [email or web address]. By joining [facility]’s FollowMyHealth portal, you will also be able to view your health information using other applications (apps) compatible with our system.

## 2023-04-24 NOTE — DISCHARGE NOTE NURSING/CASE MANAGEMENT/SOCIAL WORK - NSDCPEFALRISK_GEN_ALL_CORE
For information on Fall & Injury Prevention, visit: [email or web address] OR  [email or web address] OR  https://www.cdc.gov/steadi/patient.html

## 2023-04-25 ENCOUNTER — NON-APPOINTMENT (OUTPATIENT)
Age: 73
End: 2023-04-25

## 2023-04-25 ENCOUNTER — APPOINTMENT (OUTPATIENT)
Dept: UROLOGY | Facility: CLINIC | Age: 73
End: 2023-04-25
Payer: MEDICARE

## 2023-04-25 LAB — BACTERIA UR CULT: ABNORMAL

## 2023-04-25 PROCEDURE — 51702 INSERT TEMP BLADDER CATH: CPT

## 2023-04-25 PROCEDURE — 99024 POSTOP FOLLOW-UP VISIT: CPT

## 2023-04-25 PROCEDURE — 51700 IRRIGATION OF BLADDER: CPT | Mod: 58

## 2023-04-25 PROCEDURE — A4216: CPT | Mod: NC

## 2023-04-25 PROCEDURE — 99283 EMERGENCY DEPT VISIT LOW MDM: CPT

## 2023-04-25 NOTE — CHART NOTE - NSCHARTNOTEFT_GEN_A_CORE
called [facility], pharmacist said primary care provider would like to change antibiotics, would cancel Levaquin, says pt picked up new meds already

## 2023-04-25 NOTE — ED PROVIDER NOTE - PHYSICAL EXAMINATION
General: non-toxic appearing, in no acute distress, A+Ox3  CV: RRR, nl s1/s2.  Resp: CTAB, normal rate and effort  GI: Abdomen soft, NT, ND. Active bowel sounds. No rebound, no guarding  : No CVAT. Penis without lesions, urethral meatus normal location without DC, testes and epididymides normal sizes without masses, scrotum without lesions. sharma bag filled with bloody urine, no visible clots in tube

## 2023-04-25 NOTE — ED PROVIDER NOTE - CLINICAL SUMMARY MEDICAL DECISION MAKING FREE TEXT BOX
73yo M presented to ED for irrigation of sharma. Penis without lesions, urethral meatus normal location without DC, testes and epididymides normal sizes without masses, scrotum without lesions. sharma bag filled with bloody urine, no visible clots in tube. upon irrigating, small clots released from tubing. discussed supportive care measures and return precautions. pt has apt for sharam removal in 2d. pt verbalized understanding and agreement 73yo M presented to ED for irrigation of sharma. Penis without lesions, urethral meatus normal location without DC, testes and epididymides normal sizes without masses, scrotum without lesions. sharma bag filled with bloody urine, no visible clots in tube. upon irrigating, small clots released from tubing. discussed supportive care measures and return precautions. pt has supplies for home irrigation and appointment for sharma removal in 2days with urologist. pt verbalized understanding and agreement

## 2023-04-25 NOTE — ED PROVIDER NOTE - PATIENT PORTAL LINK FT
You can access the FollowMyHealth Patient Portal offered by [facility] by registering at the following website: [email or web address]. By joining [facility]’s FollowMyHealth portal, you will also be able to view your health information using other applications (apps) compatible with our system.

## 2023-04-26 ENCOUNTER — NON-APPOINTMENT (OUTPATIENT)
Age: 73
End: 2023-04-26

## 2023-04-26 ENCOUNTER — APPOINTMENT (OUTPATIENT)
Dept: UROLOGY | Facility: CLINIC | Age: 73
End: 2023-04-26
Payer: MEDICARE

## 2023-04-26 DIAGNOSIS — R31.0 GROSS HEMATURIA: ICD-10-CM

## 2023-04-26 PROCEDURE — 99024 POSTOP FOLLOW-UP VISIT: CPT

## 2023-04-27 ENCOUNTER — NON-APPOINTMENT (OUTPATIENT)
Age: 73
End: 2023-04-27

## 2023-04-27 DIAGNOSIS — K59.00 CONSTIPATION, UNSPECIFIED: ICD-10-CM

## 2023-04-27 DIAGNOSIS — E78.00 PURE HYPERCHOLESTEROLEMIA, UNSPECIFIED: ICD-10-CM

## 2023-04-27 DIAGNOSIS — Z95.5 PRESENCE OF CORONARY ANGIOPLASTY IMPLANT AND GRAFT: ICD-10-CM

## 2023-04-27 DIAGNOSIS — Z88.1 ALLERGY STATUS TO OTHER ANTIBIOTIC AGENTS STATUS: ICD-10-CM

## 2023-04-27 DIAGNOSIS — R31.0 GROSS HEMATURIA: ICD-10-CM

## 2023-04-27 DIAGNOSIS — K21.9 GASTRO-ESOPHAGEAL REFLUX DISEASE WITHOUT ESOPHAGITIS: ICD-10-CM

## 2023-04-27 DIAGNOSIS — Z88.0 ALLERGY STATUS TO PENICILLIN: ICD-10-CM

## 2023-04-27 DIAGNOSIS — B95.2 ENTEROCOCCUS AS THE CAUSE OF DISEASES CLASSIFIED ELSEWHERE: ICD-10-CM

## 2023-04-27 DIAGNOSIS — Z91.013 ALLERGY TO SEAFOOD: ICD-10-CM

## 2023-04-27 DIAGNOSIS — N40.0 BENIGN PROSTATIC HYPERPLASIA WITHOUT LOWER URINARY TRACT SYMPTOMS: ICD-10-CM

## 2023-04-27 DIAGNOSIS — I10 ESSENTIAL (PRIMARY) HYPERTENSION: ICD-10-CM

## 2023-04-27 DIAGNOSIS — Z79.82 LONG TERM (CURRENT) USE OF ASPIRIN: ICD-10-CM

## 2023-04-27 DIAGNOSIS — Z88.8 ALLERGY STATUS TO OTHER DRUGS, MEDICAMENTS AND BIOLOGICAL SUBSTANCES: ICD-10-CM

## 2023-04-27 DIAGNOSIS — I25.10 ATHEROSCLEROTIC HEART DISEASE OF NATIVE CORONARY ARTERY WITHOUT ANGINA PECTORIS: ICD-10-CM

## 2023-04-27 DIAGNOSIS — R82.79 OTHER ABNORMAL FINDINGS ON MICROBIOLOGICAL EXAMINATION OF URINE: ICD-10-CM

## 2023-04-27 LAB
CULTURE RESULTS: SIGNIFICANT CHANGE UP
CULTURE RESULTS: SIGNIFICANT CHANGE UP
SPECIMEN SOURCE: SIGNIFICANT CHANGE UP
SPECIMEN SOURCE: SIGNIFICANT CHANGE UP

## 2023-04-28 ENCOUNTER — NON-APPOINTMENT (OUTPATIENT)
Age: 73
End: 2023-04-28

## 2023-04-28 ENCOUNTER — APPOINTMENT (OUTPATIENT)
Dept: GASTROENTEROLOGY | Facility: CLINIC | Age: 73
End: 2023-04-28
Payer: MEDICARE

## 2023-04-28 VITALS
OXYGEN SATURATION: 99 % | SYSTOLIC BLOOD PRESSURE: 136 MMHG | WEIGHT: 181.2 LBS | DIASTOLIC BLOOD PRESSURE: 76 MMHG | BODY MASS INDEX: 25.94 KG/M2 | HEART RATE: 94 BPM | TEMPERATURE: 97.3 F | HEIGHT: 70 IN

## 2023-04-28 DIAGNOSIS — K56.41 FECAL IMPACTION: ICD-10-CM

## 2023-04-28 PROCEDURE — 99214 OFFICE O/P EST MOD 30 MIN: CPT

## 2023-04-28 RX ORDER — TICAGRELOR 90 MG/1
90 TABLET ORAL TWICE DAILY
Refills: 0 | Status: DISCONTINUED | COMMUNITY
Start: 2021-01-15 | End: 2023-04-28

## 2023-04-28 NOTE — HISTORY OF PRESENT ILLNESS
[FreeTextEntry1] : Renetta Levin MD\par [address]\[place] [address]\par \par Pleasant 72-year-old gentleman\par \par Fecal impaction after recent urologic procedure\par \par Due for colonoscopy\par \par No blood or mucus\par \par No anal, rectal, pelvic, abdominal discomfort\par \par No longer on Brilinta\par \par Followed by cardiology with stents, on aspirin

## 2023-04-28 NOTE — REASON FOR VISIT
[Follow-up] : a follow-up of an existing diagnosis [FreeTextEntry1] : Fecal impaction after urologic procedure

## 2023-04-28 NOTE — PHYSICAL EXAM
[Alert] : alert [Normal Voice/Communication] : normal voice/communication [Healthy Appearing] : healthy appearing [No Acute Distress] : no acute distress [Well Nourished] : well nourished [Sclera] : the sclera and conjunctiva were normal [Hearing Threshold Finger Rub Not [place]] : hearing was normal [Normal Appearance] : the appearance of the neck was normal [No Acc Muscle Use] : no accessory muscle use [No Respiratory Distress] : no respiratory distress [Respiration, Rhythm And Depth] : normal respiratory rhythm and effort [Heart Rate And Rhythm] : heart rate was normal and rhythm regular [None] : no edema [Bowel Sounds] : normal bowel sounds [Abdomen Tenderness] : non-tender [No Masses] : no abdominal mass palpated [Abdomen Soft] : soft [] : no hepatosplenomegaly [Cervical Lymph Nodes Enlarged Posterior Bilaterally] : no posterior cervical lymphadenopathy [No CVA Tenderness] : no CVA  tenderness [No Spinal Tenderness] : no spinal tenderness [Abnormal Walk] : normal gait [No Focal Deficits] : no focal deficits [Oriented To Time, Place, And Person] : oriented to person, place, and time [de-identified] : Pleasant gentleman, no acute distress [de-identified] : Normal anal sphincter tone, fecal impaction, large amount of solid dark brown stool removed digitally, no mass felt, nontender, no blood

## 2023-04-28 NOTE — ASSESSMENT
[FreeTextEntry1] : Impression\par \par Fecal impaction\par \par Status post recent urologic procedure\par \par Suggest\par \par Fleet enema\par \par I sent a message to the urologist requesting that the patient be able to take 1 bottle of Suprep after Fleet enema\par \par Stay on clear liquid diet today until bowels are evacuated\par \par Schedule colonoscopy electively\par \par Suprep\par \par Risks/benefits:\par The procedure, the risks and benefits and alternatives have been reviewed in great detail with the patient.  Risks including, but not limited to sedation such as cardiac and pulmonary compromise, the procedure itself such as bleeding requiring hospitalization, transfusion, surgery, temporary or permanent colostomy.  Perforation or puncture of the requiring hospitalization, surgery, temporary colostomy.\par It has been explained to the patient that though colonoscopy is thought to be the best screening exam for colon cancer and polyps, no screening exam can find all colon polyps or cancers.  \par The patient expresses understanding of the procedure and consents to undergoing the procedure.\par \par Call or return anytime sooner as needed

## 2023-05-02 ENCOUNTER — APPOINTMENT (OUTPATIENT)
Dept: UROLOGY | Facility: CLINIC | Age: 73
End: 2023-05-02
Payer: MEDICARE

## 2023-05-02 VITALS
SYSTOLIC BLOOD PRESSURE: 145 MMHG | HEART RATE: 84 BPM | BODY MASS INDEX: 25.91 KG/M2 | WEIGHT: 181 LBS | HEIGHT: 70 IN | DIASTOLIC BLOOD PRESSURE: 81 MMHG

## 2023-05-02 DIAGNOSIS — Z87.440 PERSONAL HISTORY OF URINARY (TRACT) INFECTIONS: ICD-10-CM

## 2023-05-02 PROCEDURE — 99024 POSTOP FOLLOW-UP VISIT: CPT

## 2023-05-05 ENCOUNTER — ASC (OUTPATIENT)
Dept: URBAN - METROPOLITAN AREA SURGERY 8 | Facility: SURGERY | Age: 73
Setting detail: OPHTHALMOLOGY
End: 2023-05-05
Payer: COMMERCIAL

## 2023-05-05 ENCOUNTER — OFFICE (OUTPATIENT)
Dept: URBAN - METROPOLITAN AREA CLINIC 94 | Facility: CLINIC | Age: 73
Setting detail: OPHTHALMOLOGY
End: 2023-05-05
Payer: COMMERCIAL

## 2023-05-05 DIAGNOSIS — H40.031: ICD-10-CM

## 2023-05-05 DIAGNOSIS — H44.23: ICD-10-CM

## 2023-05-05 DIAGNOSIS — H40.033: ICD-10-CM

## 2023-05-05 DIAGNOSIS — H25.13: ICD-10-CM

## 2023-05-05 DIAGNOSIS — H35.033: ICD-10-CM

## 2023-05-05 PROCEDURE — 66761 REVISION OF IRIS: CPT | Performed by: OPHTHALMOLOGY

## 2023-05-05 PROCEDURE — 92083 EXTENDED VISUAL FIELD XM: CPT | Performed by: OPHTHALMOLOGY

## 2023-05-05 PROCEDURE — 92133 CPTRZD OPH DX IMG PST SGM ON: CPT | Performed by: OPHTHALMOLOGY

## 2023-05-05 PROCEDURE — 99213 OFFICE O/P EST LOW 20 MIN: CPT | Performed by: OPHTHALMOLOGY

## 2023-05-05 ASSESSMENT — AXIALLENGTH_DERIVED
OS_AL: 29.0192
OS_AL: 27.6836
OD_AL: 25.9421
OD_AL: 27.2406

## 2023-05-05 ASSESSMENT — KERATOMETRY
OD_K2POWER_DIOPTERS: 43.75
OD_K1POWER_DIOPTERS: 42.75
OS_K1POWER_DIOPTERS: 42.00
OD_AXISANGLE_DEGREES: 040
OS_AXISANGLE_DEGREES: 134
OS_K2POWER_DIOPTERS: 43.25

## 2023-05-05 ASSESSMENT — SPHEQUIV_DERIVED
OD_SPHEQUIV: -5.25
OD_SPHEQUIV: -7.875
OS_SPHEQUIV: -10.5
OS_SPHEQUIV: -8.125

## 2023-05-05 ASSESSMENT — REFRACTION_CURRENTRX
OD_AXIS: 127
OD_VPRISM_DIRECTION: PROGS
OD_ADD: +1.00
OS_CYLINDER: -0.75
OS_SPHERE: -8.50
OS_CYLINDER: -1.25
OD_CYLINDER: SPHERE
OS_OVR_VA: 20/
OS_ADD: +1.25
OD_ADD: +2.50
OD_OVR_VA: 20/
OD_SPHERE: -5.25
OS_AXIS: 026
OS_VPRISM_DIRECTION: PROGS
OD_OVR_VA: 20/
OS_OVR_VA: 20/
OD_SPHERE: -5.25
OS_AXIS: 041
OD_CYLINDER: -1.50
OS_ADD: +2.50
OS_SPHERE: -8.00

## 2023-05-05 ASSESSMENT — REFRACTION_MANIFEST
OD_VA1: 20/20
OS_VA1: 20/20
OD_CYLINDER: -1.00
OS_VA2: 20/20
OD_VA2: 20/20
OS_SPHERE: -7.50
OS_AXIS: 045
OS_ADD: +2.50
OS_CYLINDER: -1.25
OD_AXIS: 125
OD_ADD: +2.50
OD_SPHERE: -4.75

## 2023-05-05 ASSESSMENT — REFRACTION_AUTOREFRACTION
OS_SPHERE: -9.25
OD_SPHERE: -6.75
OD_CYLINDER: -2.25
OS_CYLINDER: -2.50
OD_AXIS: 111
OS_AXIS: 057

## 2023-05-05 ASSESSMENT — VISUAL ACUITY
OS_BCVA: 20/50
OD_BCVA: 20/60

## 2023-05-05 ASSESSMENT — TONOMETRY
OS_IOP_MMHG: 16
OD_IOP_MMHG: 18

## 2023-05-05 ASSESSMENT — CONFRONTATIONAL VISUAL FIELD TEST (CVF)
OD_FINDINGS: FULL
OS_FINDINGS: FULL

## 2023-05-07 PROBLEM — Z87.440 RECENT URINARY TRACT INFECTION: Status: ACTIVE | Noted: 2023-05-07

## 2023-05-07 PROBLEM — R31.0 GROSS HEMATURIA: Status: ACTIVE | Noted: 2023-04-21

## 2023-05-07 NOTE — ASSESSMENT
[FreeTextEntry1] : Reviewed records.\par Discussed labs. \par \par Gross hematuria:\par Benign Prostatic Hyperplasia:\par Constipation: \par No bowel movement for 5 days. \par Recommended Milk of Magnesia. \par Recommended to see Gastroenterologist. \par \par Return to office for scheduled appointment or sooner if any issues.

## 2023-05-07 NOTE — HISTORY OF PRESENT ILLNESS
[FreeTextEntry1] : 73 yo male presents follow up. \par On Finasteride.\par Reports slower stream, daytime frequency 3 to 4 x and nocturia 4 to 5 x. \par Saw Dr Guzman: Had manual disimpaction of stool. \par Again getting Constipation. \par Finished Antibiotics yesterday. \par No gross hematuria and clots. \par Tightness in the back and abdominal pressure.  \par \par Status post Transurethral vaporization of prostate using green light laser on 4/3/23 at [facility]. \par \par ELZBIETA: Aug 2022.\par \par PSA: 3.9; Free% 21(1/4/23). \par \par MRI Prostate(6/29/22):\par PSA: 6.43(6/16/2022) on Dutasteride. \par Prostate volume: 70 cc. \par No MRI suspicious lesion. PIRADS 2. \par \par Seen on 5/20/22 to discuss medication. \par Saw Dr Campbell discussed options. \par On Avodart and Silodosin. Had off and on split stream, daytime frequency every 2 hours, nocturia 1 x. \par Complained of lower back tightness, hamstring pain and is unsteady to walk. \par On Statin. \par \par MRI Prostate(12/21/2018): \par PSA: 9.6 \par Prostate volume: 90 cc\par PIRADS 2 lesion. \par \par Initially seen for Elevated PSA. \par Has history of Elevated PSA, status post negative prostate biopsy and has had MRI Prostate. \par Was following with Dr Crum and [place]. Had seen me in the past at [facility]. \par Denied any recent unintentional weight loss, night sweats and new bone or back pain.\par Family history of Prostate cancer- no. \par Reported variable stream, urinates every 2-3 hours or so during the day. Nocturia of 2-3 x. \par Endorsed off and on hesitancy and sense of incomplete emptying. Has occasional urinary incontinence at night. \par Denied dysuria, hematuria, lower abdominal or flank pain, fever, chills or rigors.\par Has Erectile dysfunction. Not sexually active.\par \par With Flomax- passed out. Was prescribed Alfuzosin, had not tried. Has Cardiac issues. \par \par

## 2023-05-07 NOTE — LETTER BODY
[Dear  ___] : Dear  [unfilled], [Courtesy Letter:] : I had the pleasure of seeing your patient, [unfilled], in my office today. [Please see my note below.] : Please see my note below. [Sincerely,] : Sincerely, [FreeTextEntry3] : Roman Sanabria MD\par  of Urology\par [facility]\par \par [facility]\par Offices:\par [address] [facility], [facility]\par [address] [address]\par [address] [facility], [address]\par \par TEL: [phone]\par FAX: [phone]

## 2023-05-07 NOTE — HISTORY OF PRESENT ILLNESS
[FreeTextEntry1] : 71 yo male presents follow up. \par On Finasteride.\par Reports slower stream, daytime frequency 3 to 4 x and nocturia 4 to 5 x. \par No gross hematuria and clots. Had pink urine this morning. \par Tightness in the back and abdominal pressure \par On Levofloxacin. \par Still has Constipation. \par \par Status post Transurethral vaporization of prostate using green light laser on 4/3/23 at [facility]. \par \par ELZBIETA: Aug 2022.\par \par PSA: 3.9; Free% 21(1/4/23). \par \par MRI Prostate(6/29/22):\par PSA: 6.43(6/16/2022) on Dutasteride. \par Prostate volume: 70 cc. \par No MRI suspicious lesion. PIRADS 2. \par \par Seen on 5/20/22 to discuss medication. \par Saw Dr Campbell discussed options. \par On Avodart and Silodosin. Had off and on split stream, daytime frequency every 2 hours, nocturia 1 x. \par Complained of lower back tightness, hamstring pain and is unsteady to walk. \par On Statin. \par \par MRI Prostate(12/21/2018): \par PSA: 9.6 \par Prostate volume: 90 cc\par PIRADS 2 lesion. \par \par Initially seen for Elevated PSA. \par Has history of Elevated PSA, status post negative prostate biopsy and has had MRI Prostate. \par Was following with Dr Crum and [place]. Had seen me in the past at [facility]. \par Denied any recent unintentional weight loss, night sweats and new bone or back pain.\par Family history of Prostate cancer- no. \par Reported variable stream, urinates every 2-3 hours or so during the day. Nocturia of 2-3 x. \par Endorsed off and on hesitancy and sense of incomplete emptying. Has occasional urinary incontinence at night. \par Denied dysuria, hematuria, lower abdominal or flank pain, fever, chills or rigors.\par Has Erectile dysfunction. Not sexually active.\par \par With Flomax- passed out. Was prescribed Alfuzosin, had not tried. Has Cardiac issues. \par \par

## 2023-05-07 NOTE — ASSESSMENT
[FreeTextEntry1] : Benign Prostatic Hyperplasia:\par PVR: 48 ml. \par Continue Finasteride. \par \par Recent urinary tract infection:\par Recommended repeat Urine test 1 week after completion of Antibiotics course. \par \par Constipation:\par Recommended to follow up with Gastroenterologist. \par \par Return to office for scheduled appointment or sooner if any issues: will do Uroflo/PVR.

## 2023-05-07 NOTE — LETTER BODY
[Dear  ___] : Dear  [unfilled], [Courtesy Letter:] : I had the pleasure of seeing your patient, [unfilled], in my office today. [Please see my note below.] : Please see my note below. [Sincerely,] : Sincerely, [FreeTextEntry3] : Roman Sanabria MD\par  of Urology\par [facility]\par \par [facility]\par Offices:\par [address] [facility], [facility]\par [address] [address]\par [address] [facility], [address]\par \par TEL: [phone]\par FAX: [phone]

## 2023-05-09 LAB
APPEARANCE: ABNORMAL
BACTERIA: ABNORMAL /HPF
BILIRUBIN URINE: NEGATIVE
BLOOD URINE: ABNORMAL
CAST: NORMAL /LPF
COLOR: NORMAL
EPITHELIAL CELLS: 0 /HPF
GLUCOSE QUALITATIVE U: NEGATIVE MG/DL
KETONES URINE: NEGATIVE MG/DL
LEUKOCYTE ESTERASE URINE: ABNORMAL
MICROSCOPIC-UA: NORMAL
NITRITE URINE: NEGATIVE
PH URINE: 7.5
PROTEIN URINE: 100 MG/DL
RED BLOOD CELLS URINE: 52 /HPF
REVIEW: NORMAL
SPECIFIC GRAVITY URINE: 1.02
UROBILINOGEN URINE: 1 MG/DL
WHITE BLOOD CELLS URINE: 526 /HPF

## 2023-05-10 ENCOUNTER — TRANSCRIPTION ENCOUNTER (OUTPATIENT)
Age: 73
End: 2023-05-10

## 2023-05-11 ENCOUNTER — TRANSCRIPTION ENCOUNTER (OUTPATIENT)
Age: 73
End: 2023-05-11

## 2023-05-11 ENCOUNTER — NON-APPOINTMENT (OUTPATIENT)
Age: 73
End: 2023-05-11

## 2023-05-12 ENCOUNTER — ASC (OUTPATIENT)
Dept: URBAN - METROPOLITAN AREA SURGERY 8 | Facility: SURGERY | Age: 73
Setting detail: OPHTHALMOLOGY
End: 2023-05-12
Payer: COMMERCIAL

## 2023-05-12 DIAGNOSIS — H40.032: ICD-10-CM

## 2023-05-12 LAB — BACTERIA UR CULT: ABNORMAL

## 2023-05-12 PROCEDURE — 66761 REVISION OF IRIS: CPT | Performed by: OPHTHALMOLOGY

## 2023-05-12 ASSESSMENT — REFRACTION_CURRENTRX
OS_AXIS: 041
OS_VPRISM_DIRECTION: PROGS
OD_CYLINDER: -1.50
OS_SPHERE: -8.50
OD_VPRISM_DIRECTION: PROGS
OD_SPHERE: -5.25
OS_OVR_VA: 20/
OD_ADD: +2.50
OD_OVR_VA: 20/
OD_SPHERE: -5.25
OD_OVR_VA: 20/
OD_CYLINDER: SPHERE
OS_OVR_VA: 20/
OS_ADD: +1.25
OS_SPHERE: -8.00
OS_CYLINDER: -1.25
OS_CYLINDER: -0.75
OS_AXIS: 026
OD_AXIS: 127
OD_ADD: +1.00
OS_ADD: +2.50

## 2023-05-12 ASSESSMENT — SPHEQUIV_DERIVED
OD_SPHEQUIV: -7.875
OD_SPHEQUIV: -5.25
OS_SPHEQUIV: -8.125
OS_SPHEQUIV: -10.5

## 2023-05-12 ASSESSMENT — REFRACTION_MANIFEST
OD_VA1: 20/20
OS_VA1: 20/20
OD_ADD: +2.50
OD_VA2: 20/20
OD_CYLINDER: -1.00
OD_AXIS: 125
OS_VA2: 20/20
OS_CYLINDER: -1.25
OS_SPHERE: -7.50
OS_ADD: +2.50
OD_SPHERE: -4.75
OS_AXIS: 045

## 2023-05-12 ASSESSMENT — REFRACTION_AUTOREFRACTION
OS_SPHERE: -9.25
OD_SPHERE: -6.75
OS_CYLINDER: -2.50
OD_CYLINDER: -2.25
OS_AXIS: 057
OD_AXIS: 111

## 2023-05-12 ASSESSMENT — KERATOMETRY
OD_K2POWER_DIOPTERS: 43.75
OD_K1POWER_DIOPTERS: 42.75
OS_AXISANGLE_DEGREES: 134
OD_AXISANGLE_DEGREES: 040
OS_K1POWER_DIOPTERS: 42.00
OS_K2POWER_DIOPTERS: 43.25

## 2023-05-12 ASSESSMENT — AXIALLENGTH_DERIVED
OD_AL: 27.2406
OD_AL: 25.9421
OS_AL: 27.6836
OS_AL: 29.0192

## 2023-05-12 ASSESSMENT — VISUAL ACUITY
OS_BCVA: 20/50
OD_BCVA: 20/60

## 2023-05-15 ENCOUNTER — APPOINTMENT (OUTPATIENT)
Dept: GASTROENTEROLOGY | Facility: CLINIC | Age: 73
End: 2023-05-15
Payer: MEDICARE

## 2023-05-15 VITALS
WEIGHT: 181 LBS | HEART RATE: 75 BPM | BODY MASS INDEX: 25.91 KG/M2 | HEIGHT: 70 IN | DIASTOLIC BLOOD PRESSURE: 71 MMHG | TEMPERATURE: 96.9 F | OXYGEN SATURATION: 97 % | SYSTOLIC BLOOD PRESSURE: 111 MMHG

## 2023-05-15 DIAGNOSIS — K21.9 GASTRO-ESOPHAGEAL REFLUX DISEASE W/OUT ESOPHAGITIS: ICD-10-CM

## 2023-05-15 DIAGNOSIS — Z87.19 PERSONAL HISTORY OF OTHER DISEASES OF THE DIGESTIVE SYSTEM: ICD-10-CM

## 2023-05-15 DIAGNOSIS — K59.09 OTHER CONSTIPATION: ICD-10-CM

## 2023-05-15 PROCEDURE — 99214 OFFICE O/P EST MOD 30 MIN: CPT

## 2023-05-15 NOTE — HISTORY OF PRESENT ILLNESS
[FreeTextEntry1] : Renetta Levin MD\par [address] [facility]\[place] [address]\par \par Pleasant 72-year-old gentleman\par \par Recently seen for fecal impaction with manual disimpaction after urologic procedure\par \par He is doing much better now\par \par He is taking milk of magnesia 30 cc every other night with Metamucil\par \par He is scheduled for colonoscopy\par \par This history of colon polyp\par \par He is no longer on Brilinta\par \par He is has a history of coronary disease with stents and is only on aspirin\par \par No chest pain or shortness of breath

## 2023-05-15 NOTE — ASSESSMENT
[FreeTextEntry1] : Impression\par \par Fecal impaction\par \par GERD doing well with Pepcid as needed\par \par Request for colon cancer screening\par \par Suggest\par \par Continue antireflux diet\par \par Pepcid as needed\par \par Gaviscon as needed\par \par Try to reduce milk of magnesia\par \par Continue Metamucil\par \par MiraLAX if needed\par \par Agree with colonoscopy\par \par Cardiology clearance has been requested\par \par Anesthesia clearance\par \par Suprep\par \par The laxative, or its risks benefits and alternatives have been thoroughly reviewed with the patient in great detail. The laxative instructions have been reviewed in great detail with the patient.\par \par Risks/benefits:\par The procedure, the risks and benefits and alternatives have been reviewed in great detail with the patient.  Risks including, but not limited to sedation such as cardiac and pulmonary compromise, the procedure itself such as bleeding requiring hospitalization, transfusion, surgery, temporary or permanent colostomy.  Perforation or puncture of the requiring hospitalization, surgery, temporary colostomy.\par It has been explained to the patient that though colonoscopy is thought to be the best screening exam for colon cancer and polyps, no screening exam can find all colon polyps or cancers.  \par The patient expresses understanding of the procedure and consents to undergoing the procedure.\par

## 2023-05-15 NOTE — REASON FOR VISIT
[Follow-up] : a follow-up of an existing diagnosis [FreeTextEntry1] : Recent fecal impaction after urologic procedure, doing well now with combination of Metamucil and milk of magnesia, GERD doing well with antireflux diet and occasional Pepcid

## 2023-05-15 NOTE — PHYSICAL EXAM
[Alert] : alert [Normal Voice/Communication] : normal voice/communication [Healthy Appearing] : healthy appearing [No Acute Distress] : no acute distress [Well Nourished] : well nourished [Sclera] : the sclera and conjunctiva were normal [Hearing Threshold Finger Rub Not [place]] : hearing was normal [Normal Appearance] : the appearance of the neck was normal [No Respiratory Distress] : no respiratory distress [No Acc Muscle Use] : no accessory muscle use [Respiration, Rhythm And Depth] : normal respiratory rhythm and effort [Heart Rate And Rhythm] : heart rate was normal and rhythm regular [None] : no edema [Bowel Sounds] : normal bowel sounds [Abdomen Tenderness] : non-tender [No Masses] : no abdominal mass palpated [Abdomen Soft] : soft [] : no hepatosplenomegaly [Cervical Lymph Nodes Enlarged Posterior Bilaterally] : no posterior cervical lymphadenopathy [No CVA Tenderness] : no CVA  tenderness [No Spinal Tenderness] : no spinal tenderness [Abnormal Walk] : normal gait [No Focal Deficits] : no focal deficits [Oriented To Time, Place, And Person] : oriented to person, place, and time [de-identified] : Pleasant gentleman, no acute distress

## 2023-05-16 ENCOUNTER — NON-APPOINTMENT (OUTPATIENT)
Age: 73
End: 2023-05-16

## 2023-05-23 ENCOUNTER — APPOINTMENT (OUTPATIENT)
Dept: UROLOGY | Facility: CLINIC | Age: 73
End: 2023-05-23
Payer: MEDICARE

## 2023-05-23 VITALS
HEART RATE: 76 BPM | DIASTOLIC BLOOD PRESSURE: 78 MMHG | HEIGHT: 70 IN | BODY MASS INDEX: 25.91 KG/M2 | WEIGHT: 181 LBS | OXYGEN SATURATION: 96 % | RESPIRATION RATE: 18 BRPM | SYSTOLIC BLOOD PRESSURE: 131 MMHG

## 2023-05-23 DIAGNOSIS — R35.1 NOCTURIA: ICD-10-CM

## 2023-05-23 DIAGNOSIS — N39.0 URINARY TRACT INFECTION, SITE NOT SPECIFIED: ICD-10-CM

## 2023-05-23 LAB
APPEARANCE: ABNORMAL
BACTERIA: ABNORMAL /HPF
BILIRUBIN URINE: NEGATIVE
BLOOD URINE: ABNORMAL
CAST: 2 /LPF
COLOR: YELLOW
EPITHELIAL CELLS: 0 /HPF
GLUCOSE QUALITATIVE U: NEGATIVE MG/DL
KETONES URINE: NEGATIVE MG/DL
LEUKOCYTE ESTERASE URINE: ABNORMAL
MICROSCOPIC-UA: NORMAL
NITRITE URINE: NEGATIVE
PH URINE: 7
PROTEIN URINE: 100 MG/DL
RED BLOOD CELLS URINE: 7 /HPF
SPECIFIC GRAVITY URINE: 1.02
UROBILINOGEN URINE: 0.2 MG/DL
WHITE BLOOD CELLS URINE: 486 /HPF

## 2023-05-23 PROCEDURE — 99024 POSTOP FOLLOW-UP VISIT: CPT

## 2023-05-23 NOTE — HISTORY OF PRESENT ILLNESS
[FreeTextEntry1] : 71 yo male presents follow up. \par On Finasteride.\par Reports better stream still at times split, notices urinary incontinence after sitting for long and taking socks off at night find wet socks. Daytime frequency every 2 to 3 hours sometimes sooner and nocturia 4 to 5 x. \par Denies dysuria, hematuria, lower abdominal or flank pain, nausea, vomiting, fever, chills or rigors. \par Taking Milk of Magnesia at night, drinks water at night. \par Has Antibiotics did not take it. \par \par Saw Dr Guzman: Had manual disimpaction of stool after surgery. \par \par Status post Transurethral vaporization of prostate using green light laser on 4/3/23 at [facility]. \par \par ELZBIETA: Aug 2022.\par \par PSA: 3.9; Free% 21(1/4/23). \par \par MRI Prostate(6/29/22):\par PSA: 6.43(6/16/2022) on Dutasteride. \par Prostate volume: 70 cc. \par No MRI suspicious lesion. PIRADS 2. \par \par Seen on 5/20/22 to discuss medication. \par Saw Dr Campbell discussed options. \par On Avodart and Silodosin. Had off and on split stream, daytime frequency every 2 hours, nocturia 1 x. \par Complained of lower back tightness, hamstring pain and is unsteady to walk. \par On Statin. \par \par MRI Prostate(12/21/2018): \par PSA: 9.6 \par Prostate volume: 90 cc\par PIRADS 2 lesion. \par \par Initially seen for Elevated PSA. \par Has history of Elevated PSA, status post negative prostate biopsy and has had MRI Prostate. \par Was following with Dr Crum and [place]. Had seen me in the past at [facility]. \par Denied any recent unintentional weight loss, night sweats and new bone or back pain.\par Family history of Prostate cancer- no. \par Reported variable stream, urinates every 2-3 hours or so during the day. Nocturia of 2-3 x. \par Endorsed off and on hesitancy and sense of incomplete emptying. Has occasional urinary incontinence at night. \par Denied dysuria, hematuria, lower abdominal or flank pain, fever, chills or rigors.\par Has Erectile dysfunction. Not sexually active.\par \par With Flomax- passed out. Was prescribed Alfuzosin, had not tried. Has Cardiac issues. \par \par

## 2023-05-23 NOTE — ASSESSMENT
[FreeTextEntry1] : Reviewed records.\par Discussed labs. \par \par Urinary tract infection:\par Start Levofloxacin.\par Recommended repeat Urine test 1 week after completion of Antibiotics course. \par \par Benign Prostatic Hyperplasia:\par Continue Finasteride. \par \par Urinary incontinence:\par Asked Pt to do timed voiding every 2 hours. \par  \par Nocturia:\par Asked Pt to limit PM fluid intake. \par \par Will consider Anti cholinergic or Beta 3 agonist. \par Return to office in 2 months or sooner if any issues: will do Uroflo/PVR.

## 2023-05-23 NOTE — LETTER BODY
Have You Had Botox Before?: has had botox [Dear  ___] : Dear  [unfilled], [Courtesy Letter:] : I had the pleasure of seeing your patient, [unfilled], in my office today. [Please see my note below.] : Please see my note below. [Sincerely,] : Sincerely, [FreeTextEntry3] : Roman Sanabria MD\par  of Urology\par [facility]\par \par [facility]\par Offices:\par [address] [facility], [facility]\par [address] [address]\par [address] [facility], [address]\par \par TEL: [phone]\par FAX: [phone]

## 2023-05-25 ENCOUNTER — OFFICE (OUTPATIENT)
Dept: URBAN - METROPOLITAN AREA CLINIC 112 | Facility: CLINIC | Age: 73
Setting detail: OPHTHALMOLOGY
End: 2023-05-25
Payer: COMMERCIAL

## 2023-05-25 DIAGNOSIS — H44.23: ICD-10-CM

## 2023-05-25 DIAGNOSIS — H25.12: ICD-10-CM

## 2023-05-25 DIAGNOSIS — H35.033: ICD-10-CM

## 2023-05-25 DIAGNOSIS — H25.13: ICD-10-CM

## 2023-05-25 PROCEDURE — 92136 OPHTHALMIC BIOMETRY: CPT | Performed by: OPHTHALMOLOGY

## 2023-05-25 PROCEDURE — 92134 CPTRZ OPH DX IMG PST SGM RTA: CPT | Performed by: OPHTHALMOLOGY

## 2023-05-25 PROCEDURE — 99214 OFFICE O/P EST MOD 30 MIN: CPT | Performed by: OPHTHALMOLOGY

## 2023-05-25 ASSESSMENT — KERATOMETRY
OS_AXISANGLE2_DEGREES: 134
OS_K1K2_AVERAGE: 42.625
OS_K1POWER_DIOPTERS: 42.00
OS_CYLAXISANGLE_DEGREES: 134
OD_K1POWER_DIOPTERS: 42.75
OD_CYLPOWER_DEGREES: 1
OD_CYLAXISANGLE_DEGREES: 040
OS_CYLPOWER_DEGREES: 1.25
OD_AXISANGLE_DEGREES: 130
OS_K2POWER_DIOPTERS: 43.25
OD_K2POWER_DIOPTERS: 43.75
OD_AXISANGLE2_DEGREES: 040
OD_K1K2_AVERAGE: 43.25
OS_AXISANGLE_DEGREES: 44

## 2023-05-25 ASSESSMENT — TONOMETRY
OD_IOP_MMHG: 16
OS_IOP_MMHG: 17

## 2023-05-25 ASSESSMENT — VISUAL ACUITY
OS_BCVA: 20/50
OD_BCVA: 20/50

## 2023-05-25 ASSESSMENT — CONFRONTATIONAL VISUAL FIELD TEST (CVF)
OS_FINDINGS: FULL
OD_FINDINGS: FULL

## 2023-05-30 ENCOUNTER — NON-APPOINTMENT (OUTPATIENT)
Age: 73
End: 2023-05-30

## 2023-05-30 ENCOUNTER — TRANSCRIPTION ENCOUNTER (OUTPATIENT)
Age: 73
End: 2023-05-30

## 2023-05-30 LAB — BACTERIA UR CULT: ABNORMAL

## 2023-05-31 RX ORDER — SODIUM SULFATE, POTASSIUM SULFATE AND MAGNESIUM SULFATE 1.6; 3.13; 17.5 G/177ML; G/177ML; G/177ML
17.5-3.13-1.6 SOLUTION ORAL
Qty: 354 | Refills: 0 | Status: COMPLETED | COMMUNITY
Start: 2023-04-28 | End: 2023-05-31

## 2023-06-01 ENCOUNTER — NON-APPOINTMENT (OUTPATIENT)
Age: 73
End: 2023-06-01

## 2023-06-07 LAB
APPEARANCE: ABNORMAL
BACTERIA: NEGATIVE /HPF
BILIRUBIN URINE: NEGATIVE
BLOOD URINE: ABNORMAL
CAST: 0 /LPF
COLOR: YELLOW
EPITHELIAL CELLS: 0 /HPF
GLUCOSE QUALITATIVE U: NEGATIVE MG/DL
KETONES URINE: NEGATIVE MG/DL
LEUKOCYTE ESTERASE URINE: ABNORMAL
MICROSCOPIC-UA: NORMAL
NITRITE URINE: NEGATIVE
PH URINE: 6.5
PROTEIN URINE: 30 MG/DL
RED BLOOD CELLS URINE: 6 /HPF
SPECIFIC GRAVITY URINE: 1.02
UROBILINOGEN URINE: 1 MG/DL
WHITE BLOOD CELLS URINE: 269 /HPF

## 2023-06-07 ASSESSMENT — REFRACTION_MANIFEST
OS_VA1: 20/20
OD_ADD: +2.50
OS_CYLINDER: -1.25
OD_VA1: 20/40+
OD_SPHERE: -4.75
OD_CYLINDER: -1.00
OS_VA2: 20/20
OD_VA2: 20/20
OS_SPHERE: -8.25
OS_AXIS: 045
OD_CYLINDER: -1.25
OS_VA1: 20/50
OS_CYLINDER: -1.25
OS_SPHERE: -7.50
OD_AXIS: 125
OD_AXIS: 120
OS_AXIS: 60
OD_SPHERE: -5.50
OS_ADD: +2.50
OD_VA1: 20/20

## 2023-06-07 ASSESSMENT — KERATOMETRY
OD_K2POWER_DIOPTERS: 43.75
OS_K1POWER_DIOPTERS: 42.00
OD_AXISANGLE_DEGREES: 040
OS_AXISANGLE_DEGREES: 134
OS_K2POWER_DIOPTERS: 43.25
OD_K1POWER_DIOPTERS: 42.75

## 2023-06-07 ASSESSMENT — REFRACTION_AUTOREFRACTION
OS_CYLINDER: -2.50
OS_AXIS: 057
OD_AXIS: 111
OD_SPHERE: -6.75
OS_SPHERE: -9.25
OD_CYLINDER: -2.25

## 2023-06-07 ASSESSMENT — REFRACTION_CURRENTRX
OS_SPHERE: -8.00
OS_CYLINDER: -1.25
OS_CYLINDER: -0.75
OD_ADD: +1.00
OD_VPRISM_DIRECTION: PROGS
OD_CYLINDER: -1.50
OD_SPHERE: -5.25
OD_ADD: +2.50
OS_ADD: +2.50
OD_OVR_VA: 20/
OD_SPHERE: -5.25
OS_OVR_VA: 20/
OS_ADD: +1.25
OD_CYLINDER: SPHERE
OD_AXIS: 127
OS_AXIS: 026
OD_OVR_VA: 20/
OS_VPRISM_DIRECTION: PROGS
OS_OVR_VA: 20/
OS_SPHERE: -8.50
OS_AXIS: 041

## 2023-06-07 ASSESSMENT — SPHEQUIV_DERIVED
OD_SPHEQUIV: -6.125
OD_SPHEQUIV: -7.875
OD_SPHEQUIV: -5.25
OS_SPHEQUIV: -8.125
OS_SPHEQUIV: -10.5
OS_SPHEQUIV: -8.875

## 2023-06-07 ASSESSMENT — AXIALLENGTH_DERIVED
OS_AL: 28.0919
OS_AL: 27.6836
OD_AL: 26.3609
OS_AL: 29.0192
OD_AL: 27.2406
OD_AL: 25.9421

## 2023-06-13 DIAGNOSIS — N39.0 URINARY TRACT INFECTION, SITE NOT SPECIFIED: ICD-10-CM

## 2023-06-13 LAB — BACTERIA UR CULT: ABNORMAL

## 2023-06-13 RX ORDER — METHENAMINE HIPPURATE 1 G/1
1 TABLET ORAL TWICE DAILY
Qty: 60 | Refills: 3 | Status: ACTIVE | COMMUNITY
Start: 2023-06-13 | End: 1900-01-01

## 2023-06-21 ENCOUNTER — ASC (OUTPATIENT)
Dept: URBAN - METROPOLITAN AREA SURGERY 8 | Facility: SURGERY | Age: 73
Setting detail: OPHTHALMOLOGY
End: 2023-06-21
Payer: COMMERCIAL

## 2023-06-21 DIAGNOSIS — H25.12: ICD-10-CM

## 2023-06-21 DIAGNOSIS — H52.212: ICD-10-CM

## 2023-06-21 PROCEDURE — FEMTO FEMTOSECOND LASER: Performed by: OPHTHALMOLOGY

## 2023-06-21 PROCEDURE — 66984 XCAPSL CTRC RMVL W/O ECP: CPT | Performed by: OPHTHALMOLOGY

## 2023-06-22 ENCOUNTER — RX ONLY (RX ONLY)
Age: 73
End: 2023-06-22

## 2023-06-22 ENCOUNTER — OFFICE (OUTPATIENT)
Dept: URBAN - METROPOLITAN AREA CLINIC 112 | Facility: CLINIC | Age: 73
Setting detail: OPHTHALMOLOGY
End: 2023-06-22
Payer: COMMERCIAL

## 2023-06-22 DIAGNOSIS — Z96.1: ICD-10-CM

## 2023-06-22 PROCEDURE — 99024 POSTOP FOLLOW-UP VISIT: CPT | Performed by: PHYSICIAN ASSISTANT

## 2023-06-22 ASSESSMENT — REFRACTION_AUTOREFRACTION
OD_CYLINDER: -2.75
OS_SPHERE: -1.25
OD_SPHERE: -6.50
OS_AXIS: 132
OS_CYLINDER: -1.00
OD_AXIS: 104

## 2023-06-22 ASSESSMENT — AXIALLENGTH_DERIVED
OD_AL: 27.1795
OD_AL: 26.3037
OS_AL: 24.6898
OS_AL: 28.1571
OD_AL: 25.8867
OS_AL: 27.747

## 2023-06-22 ASSESSMENT — REFRACTION_CURRENTRX
OD_SPHERE: -5.25
OD_VPRISM_DIRECTION: PROGS
OD_AXIS: 127
OS_CYLINDER: -1.25
OS_OVR_VA: 20/
OS_AXIS: 026
OS_ADD: +2.50
OS_SPHERE: -8.00
OD_OVR_VA: 20/
OD_CYLINDER: SPHERE
OS_CYLINDER: -0.75
OS_VPRISM_DIRECTION: PROGS
OS_AXIS: 041
OD_ADD: +2.50
OD_OVR_VA: 20/
OD_CYLINDER: -1.50
OD_SPHERE: -5.25
OS_OVR_VA: 20/
OS_ADD: +1.25
OD_ADD: +1.00
OS_SPHERE: -8.50

## 2023-06-22 ASSESSMENT — SPHEQUIV_DERIVED
OD_SPHEQUIV: -6.125
OS_SPHEQUIV: -8.875
OS_SPHEQUIV: -8.125
OD_SPHEQUIV: -5.25
OS_SPHEQUIV: -1.75
OD_SPHEQUIV: -7.875

## 2023-06-22 ASSESSMENT — CONFRONTATIONAL VISUAL FIELD TEST (CVF)
OD_FINDINGS: FULL
OS_FINDINGS: FULL

## 2023-06-22 ASSESSMENT — TONOMETRY
OD_IOP_MMHG: 18
OS_IOP_MMHG: 17

## 2023-06-22 ASSESSMENT — REFRACTION_MANIFEST
OD_SPHERE: -5.50
OS_ADD: +2.50
OS_AXIS: 60
OS_CYLINDER: -1.25
OD_AXIS: 120
OD_CYLINDER: -1.25
OD_VA1: 20/20
OD_ADD: +2.50
OS_AXIS: 045
OD_VA2: 20/20
OD_SPHERE: -4.75
OD_CYLINDER: -1.00
OD_AXIS: 125
OS_VA1: 20/50
OS_SPHERE: -7.50
OD_VA1: 20/40+
OS_CYLINDER: -1.25
OS_SPHERE: -8.25
OS_VA2: 20/20
OS_VA1: 20/20

## 2023-06-22 ASSESSMENT — VISUAL ACUITY
OS_BCVA: 20/30+1
OD_BCVA: 20/30+1

## 2023-06-22 ASSESSMENT — KERATOMETRY
OD_K1POWER_DIOPTERS: 42.75
OS_K2POWER_DIOPTERS: 43.25
OS_K1POWER_DIOPTERS: 41.75
OS_AXISANGLE_DEGREES: 121
OD_AXISANGLE_DEGREES: 013
OD_K2POWER_DIOPTERS: 44.00

## 2023-06-22 ASSESSMENT — CORNEAL EDEMA CLINICAL DESCRIPTION: OS_CORNEALEDEMA: 1+

## 2023-06-26 ENCOUNTER — OFFICE (OUTPATIENT)
Dept: URBAN - METROPOLITAN AREA CLINIC 113 | Facility: CLINIC | Age: 73
Setting detail: OPHTHALMOLOGY
End: 2023-06-26
Payer: COMMERCIAL

## 2023-06-26 DIAGNOSIS — Z96.1: ICD-10-CM

## 2023-06-26 DIAGNOSIS — H25.11: ICD-10-CM

## 2023-06-26 PROCEDURE — 92136 OPHTHALMIC BIOMETRY: CPT | Performed by: OPTOMETRIST

## 2023-06-26 PROCEDURE — 99024 POSTOP FOLLOW-UP VISIT: CPT | Performed by: OPTOMETRIST

## 2023-06-26 ASSESSMENT — REFRACTION_CURRENTRX
OD_OVR_VA: 20/
OS_CYLINDER: -1.25
OS_OVR_VA: 20/
OS_VPRISM_DIRECTION: PROGS
OD_CYLINDER: SPHERE
OS_ADD: +1.25
OD_ADD: +2.50
OD_SPHERE: -5.25
OS_SPHERE: -8.50
OS_SPHERE: -8.00
OD_SPHERE: -5.25
OS_CYLINDER: -0.75
OD_ADD: +1.00
OS_ADD: +2.50
OS_AXIS: 026
OS_AXIS: 041
OD_CYLINDER: -1.50
OD_AXIS: 127
OS_OVR_VA: 20/
OD_OVR_VA: 20/
OD_VPRISM_DIRECTION: PROGS

## 2023-06-26 ASSESSMENT — VISUAL ACUITY
OD_BCVA: 20/30-1
OS_BCVA: 20/30+1

## 2023-06-26 ASSESSMENT — REFRACTION_MANIFEST
OD_CYLINDER: -1.00
OS_CYLINDER: -1.00
OD_SPHERE: -4.75
OS_CYLINDER: -1.25
OS_SPHERE: -8.25
OD_CYLINDER: -1.25
OS_VA2: 20/20
OD_SPHERE: -5.50
OS_AXIS: 075
OD_ADD: +2.50
OD_VA1: 20/20
OD_VA2: 20/20
OS_VA1: 20/20-
OD_VA1: 20/40+
OS_VA1: 20/50
OS_AXIS: 60
OD_AXIS: 125
OD_AXIS: 120
OS_SPHERE: PLANO

## 2023-06-26 ASSESSMENT — AXIALLENGTH_DERIVED
OD_AL: 26.3609
OD_AL: 25.9421
OD_AL: 27.3057
OS_AL: 28.2227
OS_AL: 24.5806

## 2023-06-26 ASSESSMENT — CORNEAL EDEMA - FOLDS/STRIAE: OS_FOLDSSTRIAE: T

## 2023-06-26 ASSESSMENT — SPHEQUIV_DERIVED
OD_SPHEQUIV: -5.25
OS_SPHEQUIV: -8.875
OD_SPHEQUIV: -8
OS_SPHEQUIV: -1.375
OD_SPHEQUIV: -6.125

## 2023-06-26 ASSESSMENT — KERATOMETRY
OD_K1POWER_DIOPTERS: 42.75
OD_AXISANGLE_DEGREES: 051
OS_AXISANGLE_DEGREES: 135
OS_K1POWER_DIOPTERS: 41.75
OD_K2POWER_DIOPTERS: 43.75
OS_K2POWER_DIOPTERS: 43.00

## 2023-06-26 ASSESSMENT — REFRACTION_AUTOREFRACTION
OD_CYLINDER: -1.50
OD_SPHERE: -7.25
OS_SPHERE: -0.50
OD_AXIS: 118
OS_CYLINDER: -1.75
OS_AXIS: 071

## 2023-06-26 ASSESSMENT — TONOMETRY: OS_IOP_MMHG: 17

## 2023-06-26 ASSESSMENT — CONFRONTATIONAL VISUAL FIELD TEST (CVF)
OD_FINDINGS: FULL
OS_FINDINGS: FULL

## 2023-06-28 ENCOUNTER — ASC (OUTPATIENT)
Dept: URBAN - METROPOLITAN AREA SURGERY 8 | Facility: SURGERY | Age: 73
Setting detail: OPHTHALMOLOGY
End: 2023-06-28
Payer: COMMERCIAL

## 2023-06-28 DIAGNOSIS — H57.03: ICD-10-CM

## 2023-06-28 DIAGNOSIS — H25.11: ICD-10-CM

## 2023-06-28 DIAGNOSIS — H21.81: ICD-10-CM

## 2023-06-28 DIAGNOSIS — H52.211: ICD-10-CM

## 2023-06-28 PROCEDURE — 66982 XCAPSL CTRC RMVL CPLX WO ECP: CPT | Performed by: OPHTHALMOLOGY

## 2023-06-28 PROCEDURE — FEMTO FEMTOSECOND LASER: Performed by: OPHTHALMOLOGY

## 2023-06-29 ENCOUNTER — OFFICE (OUTPATIENT)
Dept: URBAN - METROPOLITAN AREA CLINIC 112 | Facility: CLINIC | Age: 73
Setting detail: OPHTHALMOLOGY
End: 2023-06-29
Payer: COMMERCIAL

## 2023-06-29 ENCOUNTER — RX ONLY (RX ONLY)
Age: 73
End: 2023-06-29

## 2023-06-29 ENCOUNTER — TRANSCRIPTION ENCOUNTER (OUTPATIENT)
Age: 73
End: 2023-06-29

## 2023-06-29 DIAGNOSIS — Z79.84: ICD-10-CM

## 2023-06-29 DIAGNOSIS — E11.9: ICD-10-CM

## 2023-06-29 DIAGNOSIS — Z96.1: ICD-10-CM

## 2023-06-29 PROBLEM — H25.11 CATARACT SENILE NUCLEAR SCLEROSIS;  , RIGHT EYE: Status: RESOLVED | Noted: 2023-06-22 | Resolved: 2023-06-29

## 2023-06-29 LAB
APPEARANCE: CLEAR
BACTERIA UR CULT: NORMAL
BACTERIA: NEGATIVE /HPF
BILIRUBIN URINE: NEGATIVE
BLOOD URINE: NEGATIVE
CAST: 0 /LPF
COLOR: YELLOW
EPITHELIAL CELLS: 2 /HPF
GLUCOSE QUALITATIVE U: NEGATIVE MG/DL
KETONES URINE: ABNORMAL MG/DL
LEUKOCYTE ESTERASE URINE: ABNORMAL
MICROSCOPIC-UA: NORMAL
NITRITE URINE: NEGATIVE
PH URINE: 5.5
PROTEIN URINE: 100 MG/DL
RED BLOOD CELLS URINE: 1 /HPF
SPECIFIC GRAVITY URINE: 1.02
UROBILINOGEN URINE: 0.2 MG/DL
WHITE BLOOD CELLS URINE: 23 /HPF

## 2023-06-29 PROCEDURE — 99024 POSTOP FOLLOW-UP VISIT: CPT | Performed by: PHYSICIAN ASSISTANT

## 2023-06-29 ASSESSMENT — REFRACTION_CURRENTRX
OS_ADD: +2.50
OD_SPHERE: -5.25
OD_OVR_VA: 20/
OD_VPRISM_DIRECTION: PROGS
OS_VPRISM_DIRECTION: PROGS
OD_ADD: +2.50
OS_CYLINDER: -0.75
OD_AXIS: 127
OS_ADD: +1.25
OS_SPHERE: -8.50
OS_OVR_VA: 20/
OS_OVR_VA: 20/
OD_SPHERE: -5.25
OS_SPHERE: -8.00
OD_CYLINDER: SPHERE
OD_ADD: +1.00
OD_CYLINDER: -1.50
OS_AXIS: 041
OS_CYLINDER: -1.25
OS_AXIS: 026
OD_OVR_VA: 20/

## 2023-06-29 ASSESSMENT — REFRACTION_MANIFEST
OD_VA2: 20/20
OD_VA1: 20/20
OD_SPHERE: -5.50
OS_CYLINDER: -1.25
OS_AXIS: 60
OS_VA1: 20/20-
OD_ADD: +2.50
OD_VA1: 20/40+
OD_SPHERE: -4.75
OD_AXIS: 125
OD_CYLINDER: -1.25
OS_SPHERE: PLANO
OS_VA2: 20/20
OS_AXIS: 075
OS_SPHERE: -8.25
OD_CYLINDER: -1.00
OS_CYLINDER: -1.00
OD_AXIS: 120
OS_VA1: 20/50

## 2023-06-29 ASSESSMENT — CONFRONTATIONAL VISUAL FIELD TEST (CVF)
OD_FINDINGS: FULL
OS_FINDINGS: FULL

## 2023-06-29 ASSESSMENT — REFRACTION_AUTOREFRACTION
OD_AXIS: 122
OD_CYLINDER: -1.25
OS_CYLINDER: -1.25
OD_SPHERE: -0.25
OS_SPHERE: -0.75
OS_AXIS: 070

## 2023-06-29 ASSESSMENT — VISUAL ACUITY
OS_BCVA: 20/30-1
OD_BCVA: 20/25

## 2023-06-29 ASSESSMENT — KERATOMETRY
OS_K2POWER_DIOPTERS: 43.50
OS_K1POWER_DIOPTERS: 41.75
OD_AXISANGLE_DEGREES: 055
OD_K1POWER_DIOPTERS: 43.00
OD_K2POWER_DIOPTERS: 43.50
OS_AXISANGLE_DEGREES: 128

## 2023-06-29 ASSESSMENT — SPHEQUIV_DERIVED
OD_SPHEQUIV: -5.25
OD_SPHEQUIV: -6.125
OD_SPHEQUIV: -0.875
OS_SPHEQUIV: -8.875
OS_SPHEQUIV: -1.375

## 2023-06-29 ASSESSMENT — TONOMETRY
OD_IOP_MMHG: 21
OS_IOP_MMHG: 14

## 2023-06-29 ASSESSMENT — AXIALLENGTH_DERIVED
OD_AL: 25.9421
OS_AL: 28.0919
OD_AL: 26.3609
OD_AL: 24.0327
OS_AL: 24.4813

## 2023-06-29 ASSESSMENT — CORNEAL EDEMA CLINICAL DESCRIPTION: OD_CORNEALEDEMA: T

## 2023-07-05 NOTE — ED PROVIDER NOTE - OBJECTIVE STATEMENT
Render Risk Assessment In Note?: no Additional Notes: Patient will apply the compound 5 FU 5% calci 0.005% gel from [facility] to both the frontal scalp and the occipital scalp scar lines for 1 week. Patient was instructed to start using the gel  in 2 weeks. Patient verbalized agreement and understanding of the instructions. 3 month follow up with Dr. hCeyanne Castaneda to check treatment outcome. 71yo M PSA, HTN, UI, CAD presents to ED c/o Detail Level: Simple 73yo M PSA, HTN, UI, CAD presents to ED c/o clots in sharma. pt doesn't know how to irrigate sharma. pt DC with Sharma yesterday s/p BPH surgery followed by [facility]. pt denies dysuria, pelvis pain, abdominal pain, fever, lightheadedness,

## 2023-07-06 ENCOUNTER — NON-APPOINTMENT (OUTPATIENT)
Age: 73
End: 2023-07-06

## 2023-07-10 ENCOUNTER — OFFICE (OUTPATIENT)
Dept: URBAN - METROPOLITAN AREA CLINIC 113 | Facility: CLINIC | Age: 73
Setting detail: OPHTHALMOLOGY
End: 2023-07-10
Payer: COMMERCIAL

## 2023-07-10 DIAGNOSIS — Z96.1: ICD-10-CM

## 2023-07-10 PROBLEM — H40.033 NARROW ANGLE GLAUCOMA SUSPECT; BOTH EYES: Status: ACTIVE | Noted: 2023-07-10

## 2023-07-10 PROCEDURE — 99024 POSTOP FOLLOW-UP VISIT: CPT | Performed by: OPTOMETRIST

## 2023-07-10 ASSESSMENT — SPHEQUIV_DERIVED
OS_SPHEQUIV: -8.875
OS_SPHEQUIV: -1.5
OD_SPHEQUIV: 0
OD_SPHEQUIV: -5.25
OD_SPHEQUIV: -6.125

## 2023-07-10 ASSESSMENT — REFRACTION_MANIFEST
OD_AXIS: 125
OS_SPHERE: PLANO
OS_CYLINDER: -1.00
OD_VA1: 20/20
OD_AXIS: 120
OS_VA1: 20/20-
OD_VA1: 20/40+
OS_VA2: 20/20
OS_CYLINDER: -1.25
OD_CYLINDER: -1.25
OS_SPHERE: -8.25
OD_CYLINDER: -1.00
OD_SPHERE: -5.50
OS_AXIS: 075
OD_VA2: 20/20
OD_SPHERE: -4.75
OD_ADD: +2.50
OS_VA1: 20/50
OS_AXIS: 60

## 2023-07-10 ASSESSMENT — REFRACTION_CURRENTRX
OS_OVR_VA: 20/
OD_OVR_VA: 20/
OD_OVR_VA: 20/
OD_ADD: +2.50
OD_ADD: +1.00
OS_OVR_VA: 20/
OS_SPHERE: -8.50
OD_AXIS: 127
OS_AXIS: 041
OD_CYLINDER: -1.50
OS_AXIS: 026
OD_VPRISM_DIRECTION: PROGS
OS_SPHERE: -8.00
OS_CYLINDER: -1.25
OS_ADD: +2.50
OS_VPRISM_DIRECTION: PROGS
OD_SPHERE: -5.25
OD_CYLINDER: SPHERE
OD_SPHERE: -5.25
OS_ADD: +1.25
OS_CYLINDER: -0.75

## 2023-07-10 ASSESSMENT — AXIALLENGTH_DERIVED
OD_AL: 26.534
OD_AL: 26.1097
OS_AL: 24.435
OD_AL: 23.8237
OS_AL: 27.9623

## 2023-07-10 ASSESSMENT — REFRACTION_AUTOREFRACTION
OD_SPHERE: +0.50
OS_SPHERE: -0.75
OD_AXIS: 126
OD_CYLINDER: -1.00
OS_CYLINDER: -1.50
OS_AXIS: 074

## 2023-07-10 ASSESSMENT — KERATOMETRY
OD_K2POWER_DIOPTERS: 43.00
OD_AXISANGLE_DEGREES: 048
OS_K2POWER_DIOPTERS: 43.50
OS_AXISANGLE_DEGREES: 124
OD_K1POWER_DIOPTERS: 42.75
OS_K1POWER_DIOPTERS: 42.25

## 2023-07-10 ASSESSMENT — CONFRONTATIONAL VISUAL FIELD TEST (CVF)
OS_FINDINGS: FULL
OD_FINDINGS: FULL

## 2023-07-10 ASSESSMENT — VISUAL ACUITY
OS_BCVA: 20/20-1
OD_BCVA: 20/25-2

## 2023-07-10 ASSESSMENT — TONOMETRY
OS_IOP_MMHG: 11
OD_IOP_MMHG: 13

## 2023-07-10 ASSESSMENT — CORNEAL EDEMA CLINICAL DESCRIPTION
OD_CORNEALEDEMA: ABSENT
OS_CORNEALEDEMA: ABSENT

## 2023-07-14 NOTE — ED ADULT NURSE NOTE - RESPIRATORY ASSESSMENT
Date: 7/14/2023   Time:    Procedure: Peripherally Inserted Central Catheter (PICC Line) Insertion    Indication: Chemotherapy and TPN. Patient consent: Signed consent in chart. Reviewed procedure, indication, and risks with patient. All patient questions were answered. Pertinent Lab Values: INR, Platelets, and Creatinine were within normal ranges. Description of procedure: The patientâs left upper arm was prepped with a 2% Chlorhexidine & 70% alcohol skin antiseptic and draped in a sterile fashion and maximal barrier precautions were used. 1% Lidocaine was used to anesthetize the insertion site. The basilic vein was accessed using ultrasound guidance and a double lumen 4 Syriac catheter was introduced using the modified Seldinger technique. Each lumen of the PICC line gave brisk blood return and was easily flushed with 10 mls of sterile normal saline. The catheter was secured in place using a Statlock securement device and a Tegaderm CHG securement dressing was applied. There were no signs or symptoms of any PICC-related insertion complications and the patient tolerated the procedure well. Tip placement confirmation was obtained via  technology. 3CG confirmation placed in chart.     Total PICC length: 50    Upper arm circumference: 38    Lower arm circumference: 30    External catheter length: 0 - - -

## 2023-07-25 ENCOUNTER — APPOINTMENT (OUTPATIENT)
Dept: UROLOGY | Facility: CLINIC | Age: 73
End: 2023-07-25
Payer: MEDICARE

## 2023-07-25 VITALS
OXYGEN SATURATION: 99 % | WEIGHT: 180 LBS | HEIGHT: 70 IN | DIASTOLIC BLOOD PRESSURE: 79 MMHG | HEART RATE: 76 BPM | SYSTOLIC BLOOD PRESSURE: 163 MMHG | BODY MASS INDEX: 25.77 KG/M2

## 2023-07-25 PROCEDURE — 99214 OFFICE O/P EST MOD 30 MIN: CPT

## 2023-07-26 NOTE — PHYSICAL EXAM
[Normal Appearance] : normal appearance [General Appearance - In No Acute Distress] : no acute distress [Abdomen Soft] : soft [Abdomen Tenderness] : non-tender [] : no respiratory distress [FreeTextEntry1] : normal peripheral circulation  [Oriented To Time, Place, And Person] : oriented to person, place, and time

## 2023-07-26 NOTE — LETTER BODY
[Dear  ___] : Dear  [unfilled], [Courtesy Letter:] : I had the pleasure of seeing your patient, [unfilled], in my office today. [Please see my note below.] : Please see my note below. [Sincerely,] : Sincerely, [FreeTextEntry3] : Roman Sanabria MD\par  of Urology\par [facility]\par \par [facility]\par Offices:\par [address] [facility], [facility]\par [address] [address]\par [address] [facility], [address]\par \par TEL: [phone]\par FAX: [phone]

## 2023-07-26 NOTE — HISTORY OF PRESENT ILLNESS
[FreeTextEntry1] : 71 yo male presents follow up. \par Had Cataract surgery.  Was asked to stop Tamsulosin and Finasteride.\par Stopped Finasteride 7 weeks ago and since then has decreased breast size and he it is not tender anymore.\par Has reasonable stream, split on holding and has post void dribbling.\par Daytime frequency every 1 to 1.5 hours and nocturia 3 to 4 x. \par Denies hesitancy, straining, intermittency, urgency, incontinence, sense of incomplete emptying. \par Denies dysuria, hematuria, lower abdominal or flank pain, nausea, vomiting, fever, chills or rigors. \par \par Status post Transurethral vaporization of prostate using green light laser on 4/3/2023 at [facility]. \par \par Saw Dr Guzman: Had manual disimpaction of stool after surgery. \par \par ELZBIETA: Aug 2022.\par \par MRI Prostate(6/29/22):\par PSA: 6.43(6/16/2022) on Dutasteride. \par Prostate volume: 70 cc. \par No MRI suspicious lesion. PIRADS 2. \par \par Seen on 5/20/22 to discuss medication. \par Saw Dr Campbell discussed options. \par On Avodart and Silodosin. Had off and on split stream, daytime frequency every 2 hours, nocturia 1 x. \par Complained of lower back tightness, hamstring pain and is unsteady to walk. \par On Statin. \par \par MRI Prostate(12/21/2018): \par PSA: 9.6 \par Prostate volume: 90 cc\par PIRADS 2 lesion. \par \par Initially seen for Elevated PSA. \par Has history of Elevated PSA, status post negative prostate biopsy and has had MRI Prostate. \par Was following with Dr Crum and [place]. Had seen me in the past at [facility]. \par Denied any recent unintentional weight loss, night sweats and new bone or back pain.\par Family history of Prostate cancer- no. \par Reported variable stream, urinates every 2-3 hours or so during the day. Nocturia of 2-3 x. \par Endorsed off and on hesitancy and sense of incomplete emptying. Has occasional urinary incontinence at night. \par Denied dysuria, hematuria, lower abdominal or flank pain, fever, chills or rigors.\par Has Erectile dysfunction. Not sexually active.\par \par With Flomax- passed out. Was prescribed Alfuzosin, had not tried. Has Cardiac issues. \par \par

## 2023-07-26 NOTE — ASSESSMENT
[FreeTextEntry1] : Reviewed records including [facility]. \par Discussed labs. \par \par 7/18/2023: \par PSA: 4.0/20%<--3.9/ 21%(1/4/23). \par Creatinine: 0.91 \par Urinalysis: negative \par \par Benign Prostatic Hyperplasia:\par Mentioned can continue to stay off Finasteride.\par \par Elevated PSA:\par PSA stable.  Discussed possibly will go up considering patient has stopped Finasteride.\par Total and Free PSA 1 week before next appointment. \par \par Overactive Bladder:\par Discussed concern for overactive bladder.  Discussed treatment options: medical therapy with Anticholinergics or B3 agonists Vs Cystoscopy and Botox injections Vs Neuromodulation with percutaneous tibial nerve stimulation or Interstim after appropriate work up. Risks and benefits of each were discussed. \par Has Myrbetriq at home, will try and update us\par Mentioned medicine is expensive. \par Will check with pharmacist regarding Gemtesa.\par \par Return to [place] office in 3 months or sooner if any issues: will do Uroflo/PVR.  \par \par \par

## 2023-07-28 ENCOUNTER — NON-APPOINTMENT (OUTPATIENT)
Age: 73
End: 2023-07-28

## 2023-08-07 ENCOUNTER — RX ONLY (RX ONLY)
Age: 73
End: 2023-08-07

## 2023-08-07 ENCOUNTER — OFFICE (OUTPATIENT)
Dept: URBAN - METROPOLITAN AREA CLINIC 113 | Facility: CLINIC | Age: 73
Setting detail: OPHTHALMOLOGY
End: 2023-08-07
Payer: COMMERCIAL

## 2023-08-07 DIAGNOSIS — Z96.1: ICD-10-CM

## 2023-08-07 PROCEDURE — 99024 POSTOP FOLLOW-UP VISIT: CPT | Performed by: OPTOMETRIST

## 2023-08-07 ASSESSMENT — REFRACTION_CURRENTRX
OS_AXIS: 026
OS_ADD: +2.50
OS_OVR_VA: 20/
OD_CYLINDER: SPHERE
OS_SPHERE: -8.00
OS_VPRISM_DIRECTION: PROGS
OD_OVR_VA: 20/
OD_SPHERE: -5.25
OS_OVR_VA: 20/
OD_OVR_VA: 20/
OS_CYLINDER: -0.75
OD_AXIS: 127
OD_CYLINDER: -1.50
OS_ADD: +1.25
OD_VPRISM_DIRECTION: PROGS
OD_SPHERE: -5.25
OS_CYLINDER: -1.25
OS_AXIS: 041
OD_ADD: +1.00
OS_SPHERE: -8.50
OD_ADD: +2.50

## 2023-08-07 ASSESSMENT — REFRACTION_MANIFEST
OD_ADD: +1.50
OD_CYLINDER: -1.00
OS_VA1: 20/50
OD_VA2: 20/20
OS_CYLINDER: -1.00
OD_AXIS: 120
OS_SPHERE: -8.25
OD_SPHERE: -5.50
OS_CYLINDER: -1.25
OD_AXIS: 120
OD_CYLINDER: -1.25
OD_VA1: 20/20
OS_SPHERE: PLANO
OS_VA2: 20/20
OD_SPHERE: +0.50
OS_VA1: 20/25
OS_ADD: +1.50
OD_VA1: 20/40+
OS_AXIS: 60
OS_AXIS: 075

## 2023-08-07 ASSESSMENT — SPHEQUIV_DERIVED
OS_SPHEQUIV: -0.125
OD_SPHEQUIV: -6.125
OD_SPHEQUIV: 0
OS_SPHEQUIV: -8.875
OD_SPHEQUIV: -0.125

## 2023-08-07 ASSESSMENT — REFRACTION_AUTOREFRACTION
OS_CYLINDER: -1.25
OS_SPHERE: +0.50
OD_SPHERE: +0.50
OD_AXIS: 123
OD_CYLINDER: -1.25
OS_AXIS: 081

## 2023-08-07 ASSESSMENT — KERATOMETRY
OS_K1POWER_DIOPTERS: 42.00
OD_K1POWER_DIOPTERS: 42.75
OS_K2POWER_DIOPTERS: 43.50
OD_K2POWER_DIOPTERS: 43.25
OS_AXISANGLE_DEGREES: 127
OD_AXISANGLE_DEGREES: 039

## 2023-08-07 ASSESSMENT — AXIALLENGTH_DERIVED
OD_AL: 26.4761
OS_AL: 23.9206
OD_AL: 23.8266
OD_AL: 23.777
OS_AL: 28.0269

## 2023-08-07 ASSESSMENT — CORNEAL EDEMA CLINICAL DESCRIPTION
OD_CORNEALEDEMA: ABSENT
OS_CORNEALEDEMA: ABSENT

## 2023-08-07 ASSESSMENT — VISUAL ACUITY
OD_BCVA: 20/40
OS_BCVA: 20/40

## 2023-08-07 ASSESSMENT — TONOMETRY
OD_IOP_MMHG: 11
OS_IOP_MMHG: 11

## 2023-08-07 ASSESSMENT — CONFRONTATIONAL VISUAL FIELD TEST (CVF)
OS_FINDINGS: FULL
OD_FINDINGS: FULL

## 2023-08-22 RX ORDER — PHENAZOPYRIDINE HYDROCHLORIDE 100 MG/1
100 TABLET ORAL 3 TIMES DAILY
Qty: 9 | Refills: 0 | Status: COMPLETED | COMMUNITY
Start: 2023-05-31 | End: 2023-08-22

## 2023-08-22 RX ORDER — LEVOFLOXACIN 500 MG/1
500 TABLET, FILM COATED ORAL
Qty: 5 | Refills: 0 | Status: COMPLETED | COMMUNITY
Start: 2023-04-25 | End: 2023-08-22

## 2023-09-21 ENCOUNTER — APPOINTMENT (OUTPATIENT)
Dept: GASTROENTEROLOGY | Facility: AMBULATORY MEDICAL SERVICES | Age: 73
End: 2023-09-21
Payer: MEDICARE

## 2023-09-21 PROCEDURE — 45385 COLONOSCOPY W/LESION REMOVAL: CPT | Mod: PT

## 2023-09-27 ENCOUNTER — NON-APPOINTMENT (OUTPATIENT)
Age: 73
End: 2023-09-27

## 2023-09-28 NOTE — H&P PST ADULT - AS BP NONINV METHOD
electronic Rupali Singh  Obstetrics and Gynecology  [address] [place] [address], [address] [address]  Phone: [phone]  Fax: [phone]  Follow Up Time: 1 week

## 2023-10-27 ENCOUNTER — APPOINTMENT (OUTPATIENT)
Dept: UROLOGY | Facility: CLINIC | Age: 73
End: 2023-10-27

## 2023-12-01 ENCOUNTER — APPOINTMENT (OUTPATIENT)
Dept: UROLOGY | Facility: CLINIC | Age: 73
End: 2023-12-01
Payer: MEDICARE

## 2023-12-01 DIAGNOSIS — N32.81 OVERACTIVE BLADDER: ICD-10-CM

## 2023-12-01 PROCEDURE — 51741 ELECTRO-UROFLOWMETRY FIRST: CPT

## 2023-12-01 PROCEDURE — 99214 OFFICE O/P EST MOD 30 MIN: CPT | Mod: 25

## 2023-12-01 RX ORDER — FINASTERIDE 5 MG/1
5 TABLET, FILM COATED ORAL
Qty: 90 | Refills: 1 | Status: DISCONTINUED | COMMUNITY
Start: 2022-08-23 | End: 2023-12-01

## 2023-12-01 RX ORDER — SILODOSIN 8 MG/1
8 CAPSULE ORAL
Qty: 90 | Refills: 1 | Status: DISCONTINUED | COMMUNITY
Start: 2021-09-28 | End: 2023-12-01

## 2023-12-03 PROBLEM — N32.81 OVERACTIVE BLADDER: Status: ACTIVE | Noted: 2023-07-26

## 2023-12-21 ENCOUNTER — NON-APPOINTMENT (OUTPATIENT)
Age: 73
End: 2023-12-21

## 2023-12-22 ENCOUNTER — RESULT REVIEW (OUTPATIENT)
Age: 73
End: 2023-12-22

## 2024-01-31 ENCOUNTER — APPOINTMENT (OUTPATIENT)
Dept: DERMATOLOGY | Facility: CLINIC | Age: 74
End: 2024-01-31
Payer: MEDICARE

## 2024-01-31 PROCEDURE — 99204 OFFICE O/P NEW MOD 45 MIN: CPT

## 2024-02-15 NOTE — ED ADULT NURSE NOTE - ISOLATION TYPE:
None
General: Well appearing male in no acute distress  HEENT: Normocephalic, atraumatic. Moist mucous membranes. Oropharynx clear. No lymphadenopathy.  Eyes: No scleral icterus. EOMI. TAWANNA.  Neck:. Soft and supple. Full ROM without pain. No midline tenderness. +bilateral paraspinal C spine tenderness.   Cardiac: Regular rate and regular rhythm. No murmurs, rubs, gallops. Peripheral pulses 2+ and symmetric. No LE edema.  Resp: Lungs CTAB. Speaking in full sentences. No wheezes, rales or rhonchi.  Abd: Soft, non-tender, non-distended. No guarding or rebound. No scars, masses, or lesions.  Back: Spine midline and non-tender. No CVA tenderness.    Skin: No rashes, abrasions, or lacerations.  Neuro: AO x 3. Moves all extremities symmetrically. Motor strength and sensation grossly intact.

## 2024-03-05 NOTE — DISCHARGE NOTE PROVIDER - NSDCFUADDINST_GEN_ALL_CORE_FT
- Please take medications as reconciled.  - Please follow up with Cardiology  - Follow up with PMD in 1-2 weeks  - You are currently stable for discharge, however if your condition worsens please seek immediate medical attention.
supervision

## 2024-03-08 ENCOUNTER — APPOINTMENT (OUTPATIENT)
Dept: UROLOGY | Facility: CLINIC | Age: 74
End: 2024-03-08

## 2024-03-20 ENCOUNTER — OFFICE (OUTPATIENT)
Dept: URBAN - METROPOLITAN AREA CLINIC 113 | Facility: CLINIC | Age: 74
Setting detail: OPHTHALMOLOGY
End: 2024-03-20
Payer: MEDICARE

## 2024-03-20 DIAGNOSIS — H40.033: ICD-10-CM

## 2024-03-20 DIAGNOSIS — H26.493: ICD-10-CM

## 2024-03-20 DIAGNOSIS — H35.033: ICD-10-CM

## 2024-03-20 PROCEDURE — 92250 FUNDUS PHOTOGRAPHY W/I&R: CPT | Performed by: OPHTHALMOLOGY

## 2024-03-20 PROCEDURE — 92014 COMPRE OPH EXAM EST PT 1/>: CPT | Performed by: OPHTHALMOLOGY

## 2024-03-20 ASSESSMENT — REFRACTION_MANIFEST
OD_VA2: 20/20
OD_CYLINDER: -1.00
OD_SPHERE: -5.50
OS_SPHERE: PLANO
OS_VA2: 20/20
OD_VA1: 20/20
OS_VA1: 20/50
OS_AXIS: 60
OD_AXIS: 120
OS_AXIS: 075
OD_CYLINDER: -1.25
OS_SPHERE: -8.25
OD_VA1: 20/40+
OS_CYLINDER: -1.00
OS_CYLINDER: -1.25
OS_ADD: +1.50
OD_ADD: +1.50
OD_AXIS: 120
OS_VA1: 20/25
OD_SPHERE: +0.50

## 2024-03-20 ASSESSMENT — REFRACTION_CURRENTRX
OD_SPHERE: -5.25
OS_ADD: +2.50
OS_AXIS: 041
OS_OVR_VA: 20/
OD_AXIS: 127
OS_CYLINDER: -1.25
OS_ADD: +1.25
OD_CYLINDER: SPHERE
OS_AXIS: 026
OS_SPHERE: -8.50
OS_OVR_VA: 20/
OD_SPHERE: -5.25
OS_SPHERE: -8.00
OD_OVR_VA: 20/
OD_ADD: +2.50
OD_ADD: +1.00
OS_VPRISM_DIRECTION: PROGS
OD_VPRISM_DIRECTION: PROGS
OD_OVR_VA: 20/
OS_CYLINDER: -0.75
OD_CYLINDER: -1.50

## 2024-03-20 ASSESSMENT — SPHEQUIV_DERIVED
OD_SPHEQUIV: -6.125
OS_SPHEQUIV: -8.875
OD_SPHEQUIV: 0

## 2024-03-22 ENCOUNTER — APPOINTMENT (OUTPATIENT)
Dept: GASTROENTEROLOGY | Facility: CLINIC | Age: 74
End: 2024-03-22
Payer: MEDICARE

## 2024-03-22 VITALS
BODY MASS INDEX: 26.92 KG/M2 | RESPIRATION RATE: 14 BRPM | OXYGEN SATURATION: 98 % | SYSTOLIC BLOOD PRESSURE: 136 MMHG | WEIGHT: 188 LBS | HEIGHT: 70 IN | DIASTOLIC BLOOD PRESSURE: 88 MMHG | HEART RATE: 75 BPM

## 2024-03-22 PROCEDURE — 99204 OFFICE O/P NEW MOD 45 MIN: CPT

## 2024-03-22 RX ORDER — POLYETHYLENE GLYCOL-3350 AND ELECTROLYTES WITH FLAVOR PACK 240; 5.84; 2.98; 6.72; 22.72 G/278.26G; G/278.26G; G/278.26G; G/278.26G; G/278.26G
240 POWDER, FOR SOLUTION ORAL
Qty: 1 | Refills: 0 | Status: ACTIVE | COMMUNITY
Start: 2024-03-22 | End: 1900-01-01

## 2024-03-22 RX ORDER — MIRABEGRON 50 MG/1
50 TABLET, FILM COATED, EXTENDED RELEASE ORAL
Qty: 90 | Refills: 0 | Status: DISCONTINUED | COMMUNITY
Start: 2023-05-30 | End: 2024-03-22

## 2024-03-22 RX ORDER — NITROGLYCERIN 0.4 MG/1
0.4 TABLET SUBLINGUAL
Qty: 1 | Refills: 0 | Status: DISCONTINUED | COMMUNITY
Start: 2021-06-30 | End: 2024-03-22

## 2024-03-22 NOTE — ASSESSMENT
[FreeTextEntry1] : NAVI UPTON is a 73 year old male, with a PMH of CAD s/p stents (2020) on Aspirin, s/p ILR, HTN, high cholesterol, BPH and adenomatous colon polyps, who presents today to establish care.  Last colonoscopy was in 9/2023 which showed a 3 cm tubulovillous adenoma. Colonoscopy to be scheduled. I have discussed the indications, alternatives, benefits and potential risk including, but not limited to, bleeding, perforation, missed lesions and anesthesia complications. Bowel prep instructions discussed at length. All questions were answered. Pt is medically optimized. Labs ordered. Gavilyte prep sent to pharmacy.  For constipation, continue MiraLAX QOD.

## 2024-03-22 NOTE — ADDENDUM
[FreeTextEntry1] : I, Latosha Hebert NP, acted as scribe for RG Medina for this patient encounter.  I have personally seen and examined the patient. I agree with the history, physical examination, assessment and recommendations as noted above.  Rg Stout MD Gastroenterology

## 2024-03-22 NOTE — HISTORY OF PRESENT ILLNESS
[FreeTextEntry1] : NAVI UPTON is a 73 year old male, with a PMH of CAD s/p stents (2020) on Aspirin, s/p ILR, HTN, high cholesterol, BPH and adenomatous colon polyps, who presents today to establish care.  Pt was previously followed by Dr. Guzman. He has a personal history of colon polyps. Last colonoscopy was in 9/2023 which showed a 3 cm tubulovillous adenoma. He was recommended to repeat tin 6 months. Denies family history of colon cancer or colon polyps.  He has a h/o constipation and fecal impactions requiring digital disimpaction in the past, last was in 7/2023. He takes MiraLAX QOD which works well. He has more regular BMs. If MiraLAX ineffective, pt will take MOM w/good relief.    Denies abdominal pain, constipation, diarrhea, rectal bleeding, melena, black stools, unintentional weight loss, nausea, vomiting, GERD, or dysphagia.   Denies any significant pulmonary conditions. He has a history of cardiac stents. He follows w/cardiologist Dr. Merlos regularly.

## 2024-03-22 NOTE — PHYSICAL EXAM
[Alert] : alert [Normal Voice/Communication] : normal voice/communication [Healthy Appearing] : healthy appearing [No Acute Distress] : no acute distress [Sclera] : the sclera and conjunctiva were normal [Hearing Threshold Finger Rub Not [place]] : hearing was normal [Normal Lips/Gums] : the lips and gums were normal [Oropharynx] : the oropharynx was normal [Normal Appearance] : the appearance of the neck was normal [No Neck Mass] : no neck mass was observed [No Respiratory Distress] : no respiratory distress [No Acc Muscle Use] : no accessory muscle use [Respiration, Rhythm And Depth] : normal respiratory rhythm and effort [Auscultation Breath Sounds / Voice Sounds] : lungs were clear to auscultation bilaterally [Heart Rate And Rhythm] : heart rate was normal and rhythm regular [Normal S1, S2] : normal S1 and S2 [Murmurs] : no murmurs [Bowel Sounds] : normal bowel sounds [Abdomen Tenderness] : non-tender [Abdomen Soft] : soft [No Masses] : no abdominal mass palpated [Oriented To Time, Place, And Person] : oriented to person, place, and time [] : no hepatosplenomegaly

## 2024-04-11 NOTE — ED PROVIDER NOTE - PROGRESS NOTE DETAILS
Mr. Alcaraz is a 31-year-old male with PMH of Crohn's disease who presents today for hospital follow-up.   Admitted to [facility] 11/25/2023 - 12/6/2023 presenting with left-sided abdominal pain with nausea and vomiting. Pain also in epigastric region and some radiation to the groin. Have been ongoing for past 6 to 8 months and worsening. Also reported 15 pound weight loss over 6 months. Upon arrival to ED found with elevation of LFTs, T. bili was normal. CBC was normal. Lipase normal. CT A/P showed 4 cm x 3 cm lesion in the tail of the pancreas. Some low-attenuation lesions in the liver and left pelvis noted. No prior history of malignancy. He has a he has history of Crohn's disease, was on Remicade in the past but had been off medications for a long time. Reports Crohn's has been under control. Reported his last treatment with Remicade was when he was under the care of a pediatric GI. CT chest showed 5 mm nodule within the right upper lobe and 3 mm nodule within the right middle lobe  IR liver biopsy 11/27/2023 - pathology c/w adenocarcinoma He had an EUS with Dr. Gallego on 11/28/2023-normal esophagus, normal stomach, normal duodenum. Mass was identified in the pancreatic tail. Tissue obtained from this exam and pathology results reveal adenocarcinoma. This was staged as T2 N1 MX by endosonographic criteria.  A few enlarged lymph nodes were visualized in the peripancreatic region.  S/p Chemo-Port placement 11/30/2023 S/p celiac block on 12/1/2023 Pain management per palliative care on Dilaudid and methadone Oncology started chemotherapy with FOLFIRINOX first dose starting 12/3/2023 Following with Dr. 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 Presents today for 3-month follow-up cardiology consulted but hasn't called back. I had long discussion with patient. Patient doesn't want to wait for cardiology. no symptoms. no chest pain or sob. patient report chest discomfort has been chronic for the past few days. patient has an appointment with cardiology on Friday. BP high but asymptomatic. he will discuss his BP meds with his cardiology on Friday. strict return precaution given.

## 2024-04-16 ENCOUNTER — APPOINTMENT (OUTPATIENT)
Dept: UROLOGY | Facility: CLINIC | Age: 74
End: 2024-04-16
Payer: MEDICARE

## 2024-04-16 VITALS
DIASTOLIC BLOOD PRESSURE: 83 MMHG | BODY MASS INDEX: 26.54 KG/M2 | HEART RATE: 85 BPM | WEIGHT: 185 LBS | SYSTOLIC BLOOD PRESSURE: 139 MMHG

## 2024-04-16 DIAGNOSIS — N13.8 BENIGN PROSTATIC HYPERPLASIA WITH LOWER URINARY TRACT SYMPMS: ICD-10-CM

## 2024-04-16 DIAGNOSIS — N40.1 BENIGN PROSTATIC HYPERPLASIA WITH LOWER URINARY TRACT SYMPMS: ICD-10-CM

## 2024-04-16 DIAGNOSIS — R97.20 ELEVATED PROSTATE, SPECIFIC ANTIGEN [PSA]: ICD-10-CM

## 2024-04-16 DIAGNOSIS — R32 UNSPECIFIED URINARY INCONTINENCE: ICD-10-CM

## 2024-04-16 PROCEDURE — 99214 OFFICE O/P EST MOD 30 MIN: CPT

## 2024-04-16 NOTE — PHYSICAL EXAM
[Normal Appearance] : normal appearance [General Appearance - In No Acute Distress] : no acute distress [] : no respiratory distress [Oriented To Time, Place, And Person] : oriented to person, place, and time [Abdomen Soft] : soft [Abdomen Tenderness] : non-tender [Normal Station and Gait] : the gait and station were normal for the patient's age [de-identified] : normal peripheral circulation  [de-identified] : digital rectal exam: deferred per Patient request, has active hemorrhoid

## 2024-04-16 NOTE — HISTORY OF PRESENT ILLNESS
[FreeTextEntry1] : 73 years old male presents follow up.  Taking Myrbetriq 25 mg. Still has bladder spasms. Has variable stream, split on holding and has post void dribbling. Daytime frequency every 1 to 1.5 hours and nocturia 3 to 4 x.  Denies hesitancy, straining, intermittency, urgency, incontinence, sense of incomplete emptying.  Denies dysuria, hematuria, lower abdominal or flank pain, nausea, vomiting, fever, chills or rigors.  Had PSA with PCP.   Had Cataract surgery.  Was asked to stop Tamsulosin and Finasteride in July 2023.  Since then, had decreased breast size and no tenderness.   Status post Transurethral vaporization of prostate using green light laser on 4/3/2023 at [facility].   Saw Dr Guzman: Had manual dis impaction of stool after surgery.   ELZBIETA: Aug 2022.  PSA: 6.43/20% (6/16/2022) on Dutasteride.  MRI Prostate (6/29/22): Prostate volume: 70 cc.  No MRI suspicious lesion. PIRADS 2.   Seen on 5/20/22 to discuss medication.  Saw Dr Campbell discussed options.  On Avodart and Silodosin. Had off and on split stream, daytime frequency every 2 hours, nocturia 1 x.  Complained of lower back tightness, hamstring pain and is unsteady to walk.  On Statin.   PSA: 9.6/28% (10/11/2018).  MRI Prostate (12/21/2018):  Prostate volume: 90 cc PIRADS 2 lesion.   Initially seen for Elevated PSA.  Has history of Elevated PSA, status post negative prostate biopsy and has had MRI Prostate.  Was following with Dr Flores. Had seen me in the past at [facility].  Denied any recent unintentional weight loss, night sweats and new bone or back pain. Family history of Prostate cancer- no.  Reported variable stream, urinates every 2-3 hours or so during the day. Nocturia of 2-3 x.  Endorsed off and on hesitancy and sense of incomplete emptying. Has occasional urinary incontinence at night.  Denied dysuria, hematuria, lower abdominal or flank pain, fever, chills or rigors. Has Erectile dysfunction. Not sexually active.  With Flomax- passed out. Was prescribed Alfuzosin, had not tried. Has Cardiac issues.

## 2024-04-16 NOTE — PHYSICAL EXAM
[Normal Appearance] : normal appearance [General Appearance - In No Acute Distress] : no acute distress [] : no respiratory distress [Oriented To Time, Place, And Person] : oriented to person, place, and time [Abdomen Soft] : soft [Abdomen Tenderness] : non-tender [Normal Station and Gait] : the gait and station were normal for the patient's age [de-identified] : normal peripheral circulation  [de-identified] : digital rectal exam: deferred per Patient request, has active hemorrhoid

## 2024-04-16 NOTE — ASSESSMENT
[FreeTextEntry1] : [facility]  Discussed labs.   10/16/2023: PSA: 4.9/20% Creatinine: 0.92 Urinalysis: Negative  Benign Prostatic Hyperplasia: See uroflow and post void residual.  Status post Transurethral vaporization of prostate using green light laser on 4/3/2023 at [facility].  Off medications.  Overactive Bladder: Discussed treatment options.  Patient out of Myrbetriq.  Will increase dosage to 50 mg. Gave samples of Gemtesa until gets Myrbetriq supply. Explained possible side effects.  Discussed if patient urinating better on Gemtesa will do prescription for that.  Elevated PSA: PSA trended down considering patient is off Finasteride. Will continue PSA monitoring with PCP.  Return to office in 3 months or sooner if any issues: will do uroflow and check post void residual.

## 2024-04-16 NOTE — HISTORY OF PRESENT ILLNESS
[FreeTextEntry1] : 73 years old male presents follow up.  Taking Myrbetriq 25 mg. Still has bladder spasms. Has variable stream, split on holding and has post void dribbling. Daytime frequency every 1 to 1.5 hours and nocturia 3 to 4 x.  Denies hesitancy, straining, intermittency, urgency, incontinence, sense of incomplete emptying.  Denies dysuria, hematuria, lower abdominal or flank pain, nausea, vomiting, fever, chills or rigors.  Had PSA with PCP.   Had Cataract surgery.  Was asked to stop Tamsulosin and Finasteride in July 2023.  Since then, had decreased breast size and no tenderness.   Status post Transurethral vaporization of prostate using green light laser on 4/3/2023 at [facility].   Saw Dr Guzman: Had manual dis impaction of stool after surgery.   ELZBIETA: Aug 2022.  PSA: 6.43/20% (6/16/2022) on Dutasteride.  MRI Prostate (6/29/22): Prostate volume: 70 cc.  No MRI suspicious lesion. PIRADS 2.   Seen on 5/20/22 to discuss medication.  Saw Dr Campbell discussed options.  On Avodart and Silodosin. Had off and on split stream, daytime frequency every 2 hours, nocturia 1 x.  Complained of lower back tightness, hamstring pain and is unsteady to walk.  On Statin.   PSA: 9.6/28% (10/11/2018).  MRI Prostate (12/21/2018):  Prostate volume: 90 cc PIRADS 2 lesion.   Initially seen for Elevated PSA.  Has history of Elevated PSA, status post negative prostate biopsy and has had MRI Prostate.  Was following with Dr Flores. Had seen me in the past at [facility].  Denied any recent unintentional weight loss, night sweats and new bone or back pain. Family history of Prostate cancer- no.  Reported variable stream, urinates every 2-3 hours or so during the day. Nocturia of 2-3 x.  Endorsed off and on hesitancy and sense of incomplete emptying. Has occasional urinary incontinence at night.  Denied dysuria, hematuria, lower abdominal or flank pain, fever, chills or rigors. Has Erectile dysfunction. Not sexually active.  With Flomax- passed out. Was prescribed Alfuzosin, had not tried. Has Cardiac issues.

## 2024-04-16 NOTE — LETTER BODY
[Dear  ___] : Dear  [unfilled], [Courtesy Letter:] : I had the pleasure of seeing your patient, [unfilled], in my office today. [Please see my note below.] : Please see my note below. [Sincerely,] : Sincerely, [FreeTextEntry3] : Roman Sanabria MD  of Urology [facility]  [facility] Offices: [address] [facility], [address] [place] [address], [facility] [address] [facility], [place] [address]  TEL: [phone] FAX: [phone]

## 2024-04-16 NOTE — LETTER BODY
[Dear  ___] : Dear  [unfilled], [Courtesy Letter:] : I had the pleasure of seeing your patient, [unfilled], in my office today. [Please see my note below.] : Please see my note below. [Sincerely,] : Sincerely, [FreeTextEntry3] : Roman Sanabria MD  of Urology [facility]  [facility] Offices: [address] [facility], [address] [place] [address], [facility] [address] [facility], [place] [address]  TEL: [phone] FAX: [phone]

## 2024-04-16 NOTE — ASSESSMENT
[FreeTextEntry1] : Reviewed records including [facility], [facility].  Discussed labs.   10/16/2023: PSA: 4.9/20% Creatinine: 0.92 Urinalysis: Negative  Benign Prostatic Hyperplasia: See uroflow and post void residual.  Status post Transurethral vaporization of prostate using green light laser on 4/3/2023 at [facility].  Off medications.  Overactive Bladder: Discussed treatment options.  Patient out of Myrbetriq.  Will increase dosage to 50 mg. Gave samples of Gemtesa until gets Myrbetriq supply. Explained possible side effects.  Discussed if patient urinating better on Gemtesa will do prescription for that.  Elevated PSA: PSA trended down considering patient is off Finasteride. Will continue PSA monitoring with PCP.  Return to office in 3 months or sooner if any issues: will do uroflow and check post void residual.

## 2024-04-16 NOTE — HISTORY OF PRESENT ILLNESS
[FreeTextEntry1] : 73 years old male presents follow up.  Taking Myrbetriq 25 mg. Still has bladder spasms. Has variable stream, split on holding and has post void dribbling. Daytime frequency every 1 to 1.5 hours and nocturia 3 to 4 x.  Denies hesitancy, straining, intermittency, urgency, incontinence, sense of incomplete emptying.  Denies dysuria, hematuria, lower abdominal or flank pain, nausea, vomiting, fever, chills or rigors.  Had PSA with PCP.   Had Cataract surgery.  Was asked to stop Tamsulosin and Finasteride in July 2023.  Since then, had decreased breast size and no tenderness.   Status post Transurethral vaporization of prostate using green light laser on 4/3/2023 at [facility].   Saw Dr Guzman: Had manual dis impaction of stool after surgery.   ELZBIETA: Aug 2022.  PSA: 6.43/20% (6/16/2022) on Dutasteride.  MRI Prostate (6/29/22): Prostate volume: 70 cc.  No MRI suspicious lesion. PIRADS 2.   Seen on 5/20/22 to discuss medication.  Saw Dr Campbell discussed options.  On Avodart and Silodosin. Had off and on split stream, daytime frequency every 2 hours, nocturia 1 x.  Complained of lower back tightness, hamstring pain and is unsteady to walk.  On Statin.   PSA: 9.6/28% (10/11/2018).  MRI Prostate (12/21/2018):  Prostate volume: 90 cc PIRADS 2 lesion.   Initially seen for Elevated PSA.  Has history of Elevated PSA, status post negative prostate biopsy and has had MRI Prostate.  Was following with Dr Flores. Had seen me in the past at [facility].  Denied any recent unintentional weight loss, night sweats and new bone or back pain. Family history of Prostate cancer- no.  Reported variable stream, urinates every 2-3 hours or so during the day. Nocturia of 2-3 x.  Endorsed off and on hesitancy and sense of incomplete emptying. Has occasional urinary incontinence at night.  Denied dysuria, hematuria, lower abdominal or flank pain, fever, chills or rigors. Has Erectile dysfunction. Not sexually active.  With Flomax- passed out. Was prescribed Alfuzosin, had not tried. Has Cardiac issues.

## 2024-04-16 NOTE — LETTER BODY
[Dear  ___] : Dear  [unfilled], [Courtesy Letter:] : I had the pleasure of seeing your patient, [unfilled], in my office today. [Please see my note below.] : Please see my note below. [Sincerely,] : Sincerely, [FreeTextEntry3] : Roman Sanabria MD  of Urology [facility]  [facility] Offices: [address], [address] [place] [address], [facility] [address] [facility], [place] [address]  TEL: [phone] FAX: [phone]

## 2024-04-16 NOTE — ASSESSMENT
[FreeTextEntry1] : Reviewed records including [facility].  Discussed labs.   10/16/2023: PSA: 4.9/20% Creatinine: 0.92 Urinalysis: Negative  Benign Prostatic Hyperplasia: See uroflow and post void residual.  Status post Transurethral vaporization of prostate using green light laser on 4/3/2023 at [facility].  Off medications.  Overactive Bladder: Discussed treatment options.  Patient out of Myrbetriq.  Will increase dosage to 50 mg. Gave samples of Gemtesa until gets Myrbetriq supply. Explained possible side effects.  Discussed if patient urinating better on Gemtesa will do prescription for that.  Elevated PSA: PSA trended down considering patient is off Finasteride. Will continue PSA monitoring with PCP.  Return to office in 3 months or sooner if any issues: will do uroflow and check post void residual.

## 2024-04-16 NOTE — PHYSICAL EXAM
[Normal Appearance] : normal appearance [General Appearance - In No Acute Distress] : no acute distress [] : no respiratory distress [Oriented To Time, Place, And Person] : oriented to person, place, and time [Abdomen Soft] : soft [Abdomen Tenderness] : non-tender [Normal Station and Gait] : the gait and station were normal for the patient's age [de-identified] : normal peripheral circulation  [de-identified] : digital rectal exam: deferred per Patient request, has active hemorrhoid

## 2024-04-23 LAB
ALBUMIN SERPL ELPH-MCNC: 4.6 G/DL
ALP BLD-CCNC: 109 U/L
ALT SERPL-CCNC: 16 U/L
ANION GAP SERPL CALC-SCNC: 12 MMOL/L
AST SERPL-CCNC: 16 U/L
BASOPHILS # BLD AUTO: 0.08 K/UL
BASOPHILS NFR BLD AUTO: 1 %
BILIRUB SERPL-MCNC: 0.9 MG/DL
BUN SERPL-MCNC: 20 MG/DL
CALCIUM SERPL-MCNC: 9.2 MG/DL
CHLORIDE SERPL-SCNC: 103 MMOL/L
CO2 SERPL-SCNC: 27 MMOL/L
CREAT SERPL-MCNC: 1.04 MG/DL
EGFR: 75 ML/MIN/1.73M2
EOSINOPHIL # BLD AUTO: 0.19 K/UL
EOSINOPHIL NFR BLD AUTO: 2.3 %
GLUCOSE SERPL-MCNC: 94 MG/DL
HCT VFR BLD CALC: 42.6 %
HGB BLD-MCNC: 13.7 G/DL
IMM GRANULOCYTES NFR BLD AUTO: 0.5 %
INR PPP: 1.06 RATIO
LYMPHOCYTES # BLD AUTO: 1.62 K/UL
LYMPHOCYTES NFR BLD AUTO: 19.5 %
MAN DIFF?: NORMAL
MCHC RBC-ENTMCNC: 30.1 PG
MCHC RBC-ENTMCNC: 32.2 GM/DL
MCV RBC AUTO: 93.6 FL
MONOCYTES # BLD AUTO: 0.95 K/UL
MONOCYTES NFR BLD AUTO: 11.4 %
NEUTROPHILS # BLD AUTO: 5.43 K/UL
NEUTROPHILS NFR BLD AUTO: 65.3 %
PLATELET # BLD AUTO: 280 K/UL
POTASSIUM SERPL-SCNC: 4.8 MMOL/L
PROT SERPL-MCNC: 7 G/DL
PT BLD: 12 SEC
RBC # BLD: 4.55 M/UL
RBC # FLD: 13.3 %
SODIUM SERPL-SCNC: 143 MMOL/L
WBC # FLD AUTO: 8.31 K/UL

## 2024-04-24 ENCOUNTER — TRANSCRIPTION ENCOUNTER (OUTPATIENT)
Age: 74
End: 2024-04-24

## 2024-05-15 ENCOUNTER — TRANSCRIPTION ENCOUNTER (OUTPATIENT)
Age: 74
End: 2024-05-15

## 2024-05-16 ENCOUNTER — OUTPATIENT (OUTPATIENT)
Dept: OUTPATIENT SERVICES | Facility: HOSPITAL | Age: 74
LOS: 1 days | End: 2024-05-16
Payer: MEDICARE

## 2024-05-16 ENCOUNTER — RESULT REVIEW (OUTPATIENT)
Age: 74
End: 2024-05-16

## 2024-05-16 ENCOUNTER — APPOINTMENT (OUTPATIENT)
Dept: GASTROENTEROLOGY | Facility: HOSPITAL | Age: 74
End: 2024-05-16

## 2024-05-16 DIAGNOSIS — Z90.89 ACQUIRED ABSENCE OF OTHER ORGANS: Chronic | ICD-10-CM

## 2024-05-16 DIAGNOSIS — Z86.010 PERSONAL HISTORY OF COLONIC POLYPS: ICD-10-CM

## 2024-05-16 DIAGNOSIS — Z98.1 ARTHRODESIS STATUS: Chronic | ICD-10-CM

## 2024-05-16 DIAGNOSIS — Z98.890 OTHER SPECIFIED POSTPROCEDURAL STATES: Chronic | ICD-10-CM

## 2024-05-16 PROCEDURE — 45385 COLONOSCOPY W/LESION REMOVAL: CPT | Mod: 59

## 2024-05-16 PROCEDURE — 45380 COLONOSCOPY AND BIOPSY: CPT | Mod: 59

## 2024-05-16 PROCEDURE — 88305 TISSUE EXAM BY PATHOLOGIST: CPT | Mod: 26

## 2024-05-16 PROCEDURE — 45385 COLONOSCOPY W/LESION REMOVAL: CPT

## 2024-05-16 PROCEDURE — 88305 TISSUE EXAM BY PATHOLOGIST: CPT

## 2024-05-16 PROCEDURE — 45380 COLONOSCOPY AND BIOPSY: CPT | Mod: XS

## 2024-05-16 PROCEDURE — 45381 COLONOSCOPY SUBMUCOUS NJX: CPT

## 2024-05-16 PROCEDURE — 45390 COLONOSCOPY W/RESECTION: CPT | Mod: 59

## 2024-05-16 NOTE — PHYSICAL EXAM

## 2024-05-16 NOTE — HISTORY OF PRESENT ILLNESS
[FreeTextEntry1] : NAVI UPTON is a 73 year old male, with a PMH of CAD s/p stents (2020) on Aspirin, s/p ILR, HTN, high cholesterol, BPH and adenomatous colon polyps, who presents today to establish care.  Pt was previously followed by Dr. Guzman. He has a personal history of colon polyps. Last colonoscopy was in 9/2023 which showed a 3 cm tubulovillous adenoma. He was recommended to repeat tin 6 months. Denies family history of colon cancer or colon polyps.  He has a h/o constipation and fecal impactions requiring digital disimpaction in the past, last was in 7/2023. He takes MiraLAX QOD which works well. He has more regular BMs. If MiraLAX ineffective, pt will take MOM w/good relief.

## 2024-05-20 LAB — SURGICAL PATHOLOGY STUDY: SIGNIFICANT CHANGE UP

## 2024-05-23 ENCOUNTER — NON-APPOINTMENT (OUTPATIENT)
Age: 74
End: 2024-05-23

## 2024-07-26 ENCOUNTER — TRANSCRIPTION ENCOUNTER (OUTPATIENT)
Age: 74
End: 2024-07-26

## 2024-07-29 ENCOUNTER — TRANSCRIPTION ENCOUNTER (OUTPATIENT)
Age: 74
End: 2024-07-29

## 2024-09-04 ENCOUNTER — OFFICE (OUTPATIENT)
Dept: URBAN - METROPOLITAN AREA CLINIC 113 | Facility: CLINIC | Age: 74
Setting detail: OPHTHALMOLOGY
End: 2024-09-04
Payer: MEDICARE

## 2024-09-04 DIAGNOSIS — H44.23: ICD-10-CM

## 2024-09-04 DIAGNOSIS — H26.493: ICD-10-CM

## 2024-09-04 DIAGNOSIS — H35.033: ICD-10-CM

## 2024-09-04 DIAGNOSIS — H40.033: ICD-10-CM

## 2024-09-04 PROCEDURE — 99213 OFFICE O/P EST LOW 20 MIN: CPT | Performed by: OPHTHALMOLOGY

## 2024-09-04 PROCEDURE — 92133 CPTRZD OPH DX IMG PST SGM ON: CPT | Performed by: OPHTHALMOLOGY

## 2024-09-04 PROCEDURE — 92083 EXTENDED VISUAL FIELD XM: CPT | Performed by: OPHTHALMOLOGY

## 2024-09-04 ASSESSMENT — CONFRONTATIONAL VISUAL FIELD TEST (CVF)
OD_FINDINGS: FULL
OS_FINDINGS: FULL

## 2024-09-16 ENCOUNTER — APPOINTMENT (OUTPATIENT)
Dept: UROLOGY | Facility: CLINIC | Age: 74
End: 2024-09-16
Payer: MEDICARE

## 2024-09-16 VITALS
BODY MASS INDEX: 24.77 KG/M2 | HEART RATE: 78 BPM | SYSTOLIC BLOOD PRESSURE: 153 MMHG | WEIGHT: 173 LBS | DIASTOLIC BLOOD PRESSURE: 76 MMHG | OXYGEN SATURATION: 98 % | HEIGHT: 70 IN

## 2024-09-16 DIAGNOSIS — M48.02 SPINAL STENOSIS, CERVICAL REGION: ICD-10-CM

## 2024-09-16 DIAGNOSIS — N40.0 BENIGN PROSTATIC HYPERPLASIA WITHOUT LOWER URINARY TRACT SYMPMS: ICD-10-CM

## 2024-09-16 DIAGNOSIS — M54.12 SPINAL STENOSIS, CERVICAL REGION: ICD-10-CM

## 2024-09-16 DIAGNOSIS — R35.0 FREQUENCY OF MICTURITION: ICD-10-CM

## 2024-09-16 DIAGNOSIS — R31.9 HEMATURIA, UNSPECIFIED: ICD-10-CM

## 2024-09-16 DIAGNOSIS — N40.1 BENIGN PROSTATIC HYPERPLASIA WITH LOWER URINARY TRACT SYMPMS: ICD-10-CM

## 2024-09-16 DIAGNOSIS — N13.8 BENIGN PROSTATIC HYPERPLASIA WITH LOWER URINARY TRACT SYMPMS: ICD-10-CM

## 2024-09-16 DIAGNOSIS — N52.9 MALE ERECTILE DYSFUNCTION, UNSPECIFIED: ICD-10-CM

## 2024-09-16 DIAGNOSIS — Z86.79 PERSONAL HISTORY OF OTHER DISEASES OF THE CIRCULATORY SYSTEM: ICD-10-CM

## 2024-09-16 DIAGNOSIS — Z86.39 PERSONAL HISTORY OF OTHER ENDOCRINE, NUTRITIONAL AND METABOLIC DISEASE: ICD-10-CM

## 2024-09-16 PROCEDURE — 99214 OFFICE O/P EST MOD 30 MIN: CPT

## 2024-09-16 PROCEDURE — 51741 ELECTRO-UROFLOWMETRY FIRST: CPT

## 2024-09-16 PROCEDURE — 51798 US URINE CAPACITY MEASURE: CPT

## 2024-09-16 RX ORDER — METFORMIN HYDROCHLORIDE 625 MG/1
TABLET ORAL
Refills: 0 | Status: ACTIVE | COMMUNITY

## 2024-09-16 RX ORDER — METOPROLOL SUCCINATE 200 MG/1
TABLET, EXTENDED RELEASE ORAL
Refills: 0 | Status: ACTIVE | COMMUNITY

## 2024-09-16 RX ORDER — PANTOPRAZOLE 20 MG/1
20 TABLET, DELAYED RELEASE ORAL
Refills: 0 | Status: ACTIVE | COMMUNITY

## 2024-09-16 NOTE — PHYSICAL EXAM
[General Appearance - Well Developed] : well developed [General Appearance - Well Nourished] : well nourished [Bowel Sounds] : normal bowel sounds [No Focal Deficits] : no focal deficits [Oriented To Time, Place, And Person] : oriented to person, place, and time [de-identified] : wnl, adult pad - dry

## 2024-09-16 NOTE — ASSESSMENT
[FreeTextEntry1] : uroflow/ pvr - slow flow/ 0cc  1. luts/ BPH - s/p pvp, failed mirabegron. work up w/ uds.  2. Pt deferred psa - previous negative MRI pirad 2 (2018)

## 2024-09-16 NOTE — HISTORY OF PRESENT ILLNESS
[FreeTextEntry1] : NAVI UPTON is a 73 year male who presents with split stream, ? UUI wears pads but they are dry. He complains of weak stream for many years. Mr. UPTON has been treated with PVP - limited improvement. no improvement with mirabegron.   [Urinary Urgency] : urinary urgency [Weak Stream] : weak stream [Erectile Dysfunction] : Erectile Dysfunction

## 2024-09-18 ENCOUNTER — OFFICE (OUTPATIENT)
Dept: URBAN - METROPOLITAN AREA CLINIC 94 | Facility: CLINIC | Age: 74
Setting detail: OPHTHALMOLOGY
End: 2024-09-18
Payer: MEDICARE

## 2024-09-18 ENCOUNTER — RX ONLY (RX ONLY)
Age: 74
End: 2024-09-18

## 2024-09-18 ENCOUNTER — ASC (OUTPATIENT)
Dept: URBAN - METROPOLITAN AREA SURGERY 8 | Facility: SURGERY | Age: 74
Setting detail: OPHTHALMOLOGY
End: 2024-09-18
Payer: MEDICARE

## 2024-09-18 DIAGNOSIS — H35.033: ICD-10-CM

## 2024-09-18 DIAGNOSIS — H35.373: ICD-10-CM

## 2024-09-18 DIAGNOSIS — H26.491: ICD-10-CM

## 2024-09-18 DIAGNOSIS — E11.3293: ICD-10-CM

## 2024-09-18 DIAGNOSIS — H40.033: ICD-10-CM

## 2024-09-18 PROCEDURE — 92134 CPTRZ OPH DX IMG PST SGM RTA: CPT | Performed by: OPHTHALMOLOGY

## 2024-09-18 PROCEDURE — 66821 AFTER CATARACT LASER SURGERY: CPT | Mod: RT | Performed by: OPHTHALMOLOGY

## 2024-09-18 PROCEDURE — 99213 OFFICE O/P EST LOW 20 MIN: CPT | Mod: 57 | Performed by: OPHTHALMOLOGY

## 2024-09-18 ASSESSMENT — CONFRONTATIONAL VISUAL FIELD TEST (CVF)
OD_FINDINGS: FULL
OS_FINDINGS: FULL

## 2024-09-18 NOTE — ED ADULT NURSE NOTE - EXTENSIONS OF SELF_ADULT
No care due was identified.  [facility] Embedded Care Due Messages. Reference number: [id number].   9/18/2024 3:23:11 AM CDT   None

## 2024-09-26 ENCOUNTER — RX ONLY (RX ONLY)
Age: 74
End: 2024-09-26

## 2024-09-26 ENCOUNTER — ASC (OUTPATIENT)
Dept: URBAN - METROPOLITAN AREA SURGERY 8 | Facility: SURGERY | Age: 74
Setting detail: OPHTHALMOLOGY
End: 2024-09-26
Payer: MEDICARE

## 2024-09-26 DIAGNOSIS — H26.492: ICD-10-CM

## 2024-09-26 PROBLEM — E11.3291 DM TYPE 2; RIGHT MILD WITHOUT ME, LEFT MILD WITHOUT ME: Status: ACTIVE | Noted: 2024-09-04

## 2024-09-26 PROBLEM — H35.373 EPIRETINAL MEMBRANE; BOTH EYES: Status: ACTIVE | Noted: 2024-09-18

## 2024-09-26 PROBLEM — H26.493 POSTERIOR CAPSULAR OPACIFICATION; RIGHT EYE, LEFT EYE, BOTH EYES: Status: ACTIVE | Noted: 2024-09-18

## 2024-09-26 PROBLEM — E11.3292 DM TYPE 2; RIGHT MILD WITHOUT ME, LEFT MILD WITHOUT ME: Status: ACTIVE | Noted: 2024-09-04

## 2024-09-26 PROBLEM — H26.491 POSTERIOR CAPSULAR OPACIFICATION; RIGHT EYE, LEFT EYE, BOTH EYES: Status: ACTIVE | Noted: 2024-09-18

## 2024-09-26 PROCEDURE — 66821 AFTER CATARACT LASER SURGERY: CPT | Mod: 79,LT | Performed by: OPHTHALMOLOGY

## 2024-09-30 ENCOUNTER — APPOINTMENT (OUTPATIENT)
Dept: ORTHOPEDIC SURGERY | Facility: CLINIC | Age: 74
End: 2024-09-30

## 2024-09-30 NOTE — DISCHARGE NOTE PROVIDER - PROVIDER TOKENS
Addended by: VAN CORDERO on: 9/30/2024 05:46 PM     Modules accepted: Orders     PROVIDER:[TOKEN:[[id number]],FOLLOWUP:[2 weeks]],FREE:[LAST:[PMD],PHONE:[(   )    -],FAX:[(   )    -]]

## 2024-10-07 ENCOUNTER — APPOINTMENT (OUTPATIENT)
Dept: UROLOGY | Facility: CLINIC | Age: 74
End: 2024-10-07

## 2024-10-09 ENCOUNTER — OFFICE (OUTPATIENT)
Dept: URBAN - METROPOLITAN AREA CLINIC 113 | Facility: CLINIC | Age: 74
Setting detail: OPHTHALMOLOGY
End: 2024-10-09
Payer: MEDICARE

## 2024-10-09 DIAGNOSIS — Z96.1: ICD-10-CM

## 2024-10-09 PROCEDURE — 99024 POSTOP FOLLOW-UP VISIT: CPT | Performed by: OPTOMETRIST

## 2024-10-09 ASSESSMENT — REFRACTION_MANIFEST
OS_ADD: +1.50
OS_AXIS: 075
OD_AXIS: 120
OS_VA1: 20/50
OD_VA2: 20/20
OD_CYLINDER: -1.25
OD_AXIS: 120
OD_VA1: 20/40+
OD_SPHERE: -5.50
OS_AXIS: 60
OS_VA2: 20/20
OS_VA1: 20/25
OS_CYLINDER: -1.25
OD_ADD: +1.50
OD_VA1: 20/20
OD_SPHERE: +0.50
OS_SPHERE: PLANO
OD_CYLINDER: -1.00
OS_SPHERE: -8.25
OS_CYLINDER: -1.00

## 2024-10-09 ASSESSMENT — KERATOMETRY
OS_K1POWER_DIOPTERS: 42.25
METHOD_AUTO_MANUAL: AUTO
OS_AXISANGLE_DEGREES: 126
OD_K1POWER_DIOPTERS: 42.50
OS_K2POWER_DIOPTERS: 43.50
OD_AXISANGLE_DEGREES: 037
OD_K2POWER_DIOPTERS: 43.25

## 2024-10-09 ASSESSMENT — REFRACTION_CURRENTRX
OS_CYLINDER: -0.75
OD_AXIS: 127
OS_ADD: +2.50
OD_ADD: +2.50
OD_CYLINDER: SPHERE
OS_OVR_VA: 20/
OD_CYLINDER: -1.50
OD_ADD: +1.00
OS_AXIS: 026
OD_VPRISM_DIRECTION: PROGS
OS_OVR_VA: 20/
OD_SPHERE: -5.25
OS_CYLINDER: -1.25
OS_AXIS: 041
OS_VPRISM_DIRECTION: PROGS
OD_OVR_VA: 20/
OS_SPHERE: -8.50
OS_ADD: +1.25
OS_SPHERE: -8.00
OD_SPHERE: -5.25
OD_OVR_VA: 20/

## 2024-10-09 ASSESSMENT — VISUAL ACUITY
OS_BCVA: 20/30-
OD_BCVA: 20/40

## 2024-10-09 ASSESSMENT — TONOMETRY
OS_IOP_MMHG: 14
OD_IOP_MMHG: 13

## 2024-10-09 ASSESSMENT — REFRACTION_AUTOREFRACTION
OS_AXIS: 079
OD_CYLINDER: -1.50
OD_SPHERE: +1.00
OS_SPHERE: -0.25
OD_AXIS: 117
OS_CYLINDER: -1.25

## 2024-10-09 ASSESSMENT — CONFRONTATIONAL VISUAL FIELD TEST (CVF)
OD_FINDINGS: FULL
OS_FINDINGS: FULL

## 2024-10-10 NOTE — HISTORY OF PRESENT ILLNESS
Daily Note     Today's date: 10/10/2024  Patient name: Maye Lilly  : 1961  MRN: [id number]  Referring provider: Megan Portillo PA-C  Dx:   Encounter Diagnosis     ICD-10-CM    1. Left pontine CVA (HCC)  I63.9       2. Neurologic gait disorder  R26.9       3. Balance disorder  R26.89           Start Time: 908  Stop Time: 955  Total time in clinic (min): 47 minutes    Subjective: pt reports she is excited and nervous for her first treatment      Objective: See treatment diary below      Assessment: Tolerated treatment well. Patient would benefit from continued PT pt trialed SPC for the first time today with appropriate balance and gait mechanics with new device. Pt ambulates slightly slower due to dec confidence in SPC as compared to kaylyn walker and walks with a step to 3 point gait pattern. Initial contact w lateral border of foot. 3# AW trialed however progressed to 5# AW and then 7.5# on RLE for inc error augmentation and forced use of the RLE. Forced inc step length of LLE with inc stance time on RLE for merna navigation. Step taps for continue RLE stance time.      Plan: Continue per plan of care.      Short Term Goal Expiration Date: 24  Long Term Goal Expiration Date: 25  POC Expiration Date: 25        POC expires Unit limit Auth Expiration date PT/OT/ST + Visit Limit?   25                                                 Visit/Unit Tracking  AUTH Status:  Date  10/9  10/10                     Needs auth Used  1  2                       Remaining   0                             Specialty Daily Treatment Diary  Precautions: Fall risk, past Hx R knee arthritis (can be stiff and swollen at the start of the day)      Manuals 10/9/24 IE  10/10                                                                 Neuro Re-Ed  Pt education: neuroplasticity, recovery and compensation s/p CVA, HIGT parameters x10 min            HIGT    SOLO    1/4 track x2 laps SPC use      2 laps 5# AW  RLE SPC    1 lap SPC 7.5# AW            hurdles    SOLO    SPC   5#AW RLE    Over 3 SPC canes  X3 laps          step taps    SOLO    4'' step    3x10 LLE taps                                                                 Ther Ex                                                                                                                             Ther Activity                                         Gait Training                                         Modalities                                                    [Urinary Incontinence] : urinary incontinence [None] : None [Dysuria] : no dysuria [FreeTextEntry1] : Chart reviewed\par See notes from Dr. Sanabria\par 71 yr old male presents for a second opinion on BPH. Pt is interested in Laser enucleation of prostate. Pt complaining of incomplete emptying and leaking of urine. Pt is not sure if there is more leakage when movement. Pt wearing two pull ups a day. Pt unable to describe if urge or stress incontinence. \par \par Hx of ED\par Flomax cause syncope in the past\par Alfuzosin was prescribed but he didn't try\par He is now on Silodosin and Finasteride\par \par MRI Prostate(12/21/2018): \par PSA: 9.6  Oct 2018\par Prostate volume- 90 cc\par PI-RADS 2 lesion\par negative prostate biopsy\par \par Uroflow (March 2022) Peak flow 2.7cc/sec, voided 147cc\par PVR 148cc\par \par Pt has 3 cardiac stent- Plavix was stopped by cardiologist. \par \par PSA\par 4.91- 2/2022 , free 25% on finasteride for 5-6 months \par 6.72- 10/2021, free 28%\par 6.7- 07/2021, free 39% \par  [Hematuria - Gross] : no gross hematuria

## 2024-10-22 ENCOUNTER — APPOINTMENT (OUTPATIENT)
Dept: UROLOGY | Facility: CLINIC | Age: 74
End: 2024-10-22
Payer: MEDICARE

## 2024-10-22 VITALS
BODY MASS INDEX: 25.05 KG/M2 | HEART RATE: 60 BPM | HEIGHT: 70 IN | WEIGHT: 175 LBS | DIASTOLIC BLOOD PRESSURE: 78 MMHG | SYSTOLIC BLOOD PRESSURE: 156 MMHG

## 2024-10-22 PROCEDURE — 99214 OFFICE O/P EST MOD 30 MIN: CPT | Mod: 25

## 2024-10-22 PROCEDURE — 51741 ELECTRO-UROFLOWMETRY FIRST: CPT

## 2024-10-24 ENCOUNTER — TRANSCRIPTION ENCOUNTER (OUTPATIENT)
Age: 74
End: 2024-10-24

## 2024-10-24 DIAGNOSIS — Z12.11 ENCOUNTER FOR SCREENING FOR MALIGNANT NEOPLASM OF COLON: ICD-10-CM

## 2024-10-28 ENCOUNTER — APPOINTMENT (OUTPATIENT)
Dept: UROLOGY | Facility: CLINIC | Age: 74
End: 2024-10-28

## 2024-10-28 ENCOUNTER — APPOINTMENT (OUTPATIENT)
Dept: UROLOGY | Facility: CLINIC | Age: 74
End: 2024-10-28
Payer: MEDICARE

## 2024-10-28 VITALS
HEIGHT: 70 IN | OXYGEN SATURATION: 99 % | HEART RATE: 78 BPM | RESPIRATION RATE: 18 BRPM | SYSTOLIC BLOOD PRESSURE: 196 MMHG | DIASTOLIC BLOOD PRESSURE: 89 MMHG | WEIGHT: 176 LBS | BODY MASS INDEX: 25.2 KG/M2

## 2024-10-28 PROCEDURE — 51784 ANAL/URINARY MUSCLE STUDY: CPT

## 2024-10-28 PROCEDURE — 51797 INTRAABDOMINAL PRESSURE TEST: CPT

## 2024-10-28 PROCEDURE — 99213 OFFICE O/P EST LOW 20 MIN: CPT | Mod: 25

## 2024-10-28 PROCEDURE — 51728 CYSTOMETROGRAM W/VP: CPT

## 2024-10-28 PROCEDURE — 51741 ELECTRO-UROFLOWMETRY FIRST: CPT

## 2024-10-31 ENCOUNTER — APPOINTMENT (OUTPATIENT)
Dept: UROLOGY | Facility: CLINIC | Age: 74
End: 2024-10-31
Payer: MEDICARE

## 2024-10-31 DIAGNOSIS — N40.1 BENIGN PROSTATIC HYPERPLASIA WITH LOWER URINARY TRACT SYMPMS: ICD-10-CM

## 2024-10-31 DIAGNOSIS — R97.20 ELEVATED PROSTATE, SPECIFIC ANTIGEN [PSA]: ICD-10-CM

## 2024-10-31 DIAGNOSIS — N13.8 BENIGN PROSTATIC HYPERPLASIA WITH LOWER URINARY TRACT SYMPMS: ICD-10-CM

## 2024-10-31 DIAGNOSIS — N32.81 OVERACTIVE BLADDER: ICD-10-CM

## 2024-10-31 PROCEDURE — 99442: CPT

## 2024-12-03 ENCOUNTER — APPOINTMENT (OUTPATIENT)
Dept: UROLOGY | Facility: CLINIC | Age: 74
End: 2024-12-03

## 2024-12-04 ENCOUNTER — TRANSCRIPTION ENCOUNTER (OUTPATIENT)
Age: 74
End: 2024-12-04

## 2024-12-10 ENCOUNTER — APPOINTMENT (OUTPATIENT)
Dept: UROLOGY | Facility: CLINIC | Age: 74
End: 2024-12-10

## 2024-12-10 VITALS
DIASTOLIC BLOOD PRESSURE: 81 MMHG | SYSTOLIC BLOOD PRESSURE: 137 MMHG | BODY MASS INDEX: 25.11 KG/M2 | WEIGHT: 175 LBS | HEART RATE: 64 BPM

## 2024-12-10 DIAGNOSIS — R32 UNSPECIFIED URINARY INCONTINENCE: ICD-10-CM

## 2024-12-10 DIAGNOSIS — R97.20 ELEVATED PROSTATE, SPECIFIC ANTIGEN [PSA]: ICD-10-CM

## 2024-12-10 DIAGNOSIS — N40.1 BENIGN PROSTATIC HYPERPLASIA WITH LOWER URINARY TRACT SYMPMS: ICD-10-CM

## 2024-12-10 DIAGNOSIS — N13.8 BENIGN PROSTATIC HYPERPLASIA WITH LOWER URINARY TRACT SYMPMS: ICD-10-CM

## 2024-12-10 PROCEDURE — 99214 OFFICE O/P EST MOD 30 MIN: CPT | Mod: 25

## 2024-12-10 PROCEDURE — 52000 CYSTOURETHROSCOPY: CPT

## 2024-12-30 DIAGNOSIS — Z12.11 ENCOUNTER FOR SCREENING FOR MALIGNANT NEOPLASM OF COLON: ICD-10-CM

## 2024-12-30 RX ORDER — POLYETHYLENE GLYCOL-3350 AND ELECTROLYTES WITH FLAVOR PACK 240; 5.84; 2.98; 6.72; 22.72 G/278.26G; G/278.26G; G/278.26G; G/278.26G; G/278.26G
240 POWDER, FOR SOLUTION ORAL
Qty: 1 | Refills: 0 | Status: ACTIVE | COMMUNITY
Start: 2024-12-30 | End: 1900-01-01

## 2025-01-21 ENCOUNTER — TRANSCRIPTION ENCOUNTER (OUTPATIENT)
Age: 75
End: 2025-01-21

## 2025-01-21 ENCOUNTER — APPOINTMENT (OUTPATIENT)
Dept: GASTROENTEROLOGY | Facility: GI CENTER | Age: 75
End: 2025-01-21
Payer: MEDICARE

## 2025-01-21 ENCOUNTER — OUTPATIENT (OUTPATIENT)
Dept: OUTPATIENT SERVICES | Facility: HOSPITAL | Age: 75
LOS: 1 days | End: 2025-01-21
Payer: MEDICARE

## 2025-01-21 DIAGNOSIS — Z98.1 ARTHRODESIS STATUS: Chronic | ICD-10-CM

## 2025-01-21 DIAGNOSIS — Z12.11 ENCOUNTER FOR SCREENING FOR MALIGNANT NEOPLASM OF COLON: ICD-10-CM

## 2025-01-21 DIAGNOSIS — R35.0 FREQUENCY OF MICTURITION: ICD-10-CM

## 2025-01-21 DIAGNOSIS — N52.9 MALE ERECTILE DYSFUNCTION, UNSPECIFIED: ICD-10-CM

## 2025-01-21 DIAGNOSIS — Z98.890 OTHER SPECIFIED POSTPROCEDURAL STATES: Chronic | ICD-10-CM

## 2025-01-21 DIAGNOSIS — Z86.0101 PERSONAL HISTORY OF ADENOMATOUS AND SERRATED COLON POLYPS: ICD-10-CM

## 2025-01-21 DIAGNOSIS — Z90.89 ACQUIRED ABSENCE OF OTHER ORGANS: Chronic | ICD-10-CM

## 2025-01-21 DIAGNOSIS — R31.9 HEMATURIA, UNSPECIFIED: ICD-10-CM

## 2025-01-21 PROCEDURE — G0105: CPT

## 2025-01-21 PROCEDURE — 45378 DIAGNOSTIC COLONOSCOPY: CPT | Mod: PT

## 2025-06-17 ENCOUNTER — APPOINTMENT (OUTPATIENT)
Dept: UROLOGY | Facility: CLINIC | Age: 75
End: 2025-06-17
Payer: MEDICARE

## 2025-06-17 VITALS — WEIGHT: 175 LBS | BODY MASS INDEX: 25.11 KG/M2

## 2025-06-17 PROCEDURE — 51741 ELECTRO-UROFLOWMETRY FIRST: CPT

## 2025-06-17 PROCEDURE — 99214 OFFICE O/P EST MOD 30 MIN: CPT | Mod: 25

## 2025-06-17 RX ORDER — VIBEGRON 75 MG/1
75 TABLET, FILM COATED ORAL
Qty: 90 | Refills: 1 | Status: ACTIVE | COMMUNITY
Start: 2025-06-17 | End: 1900-01-01

## 2025-06-18 ENCOUNTER — RESULT CHARGE (OUTPATIENT)
Age: 75
End: 2025-06-18

## 2025-06-19 NOTE — ED STATDOCS - CPE ED RESP NORM
Bed/Stretcher in lowest position, wheels locked, appropriate side rails in place/Call bell, personal items and telephone in reach/Instruct patient to call for assistance before getting out of bed/chair/stretcher/Non-slip footwear applied when patient is off stretcher/[place] to call system/Physically safe environment - no spills, clutter or unnecessary equipment/Purposeful proactive rounding/Room/bathroom lighting operational, light cord in reach
normal...

## 2025-08-06 ENCOUNTER — TRANSCRIPTION ENCOUNTER (OUTPATIENT)
Age: 75
End: 2025-08-06

## (undated) DEVICE — SYR LUER SLIP TIP 50CC

## (undated) DEVICE — MASK PROCED EARLOOP 50/BX LRC COVID ADD

## (undated) DEVICE — SENSOR O2 FINGER ADULT

## (undated) DEVICE — ELCTR GROUNDING PAD ADULT COVIDIEN

## (undated) DEVICE — PACK IV START WITH CHG

## (undated) DEVICE — SNR PROFILE WIDE OVAL 11MM LOOP 1.9MM 240CM

## (undated) DEVICE — SOL BAG NS 0.9% 1000ML

## (undated) DEVICE — SNARE ENDO EXACTO COLD

## (undated) DEVICE — LIFTER SYR EVERLIFT AGENT SUBMUCOSAL 5ML

## (undated) DEVICE — TUBING IV EXTENSION MACRO W CLAVE 7"

## (undated) DEVICE — CATH IV SAFE BC 22G X 1" (BLUE)

## (undated) DEVICE — KIT DEFENDO 4 OLY 4 PC

## (undated) DEVICE — Device

## (undated) DEVICE — SOL IRR BAG H2O 1000ML

## (undated) DEVICE — DENTURE CUP PINK

## (undated) DEVICE — FORCEP RADIAL JAW 4 JUMBO W NDL 2.8MM 3.2MM 240CM ORANGE DISP

## (undated) DEVICE — SYR SLIP 10CC

## (undated) DEVICE — DRSG 2X2

## (undated) DEVICE — FORCEP RADIAL JAW 4 W NDL 2.4MM 2.8MM 240CM ORANGE DISP

## (undated) DEVICE — RETRIEVER ROTH NET PLATINUM-UNIVERSAL

## (undated) DEVICE — GOWN IMPERV XL

## (undated) DEVICE — POLY TRAP ETRAP

## (undated) DEVICE — SSH-ERBE RM1 11351341: Type: DURABLE MEDICAL EQUIPMENT

## (undated) DEVICE — TUBING ALARIS PUMP MODULE NON-DEHP

## (undated) DEVICE — SNARE CAPTIVATOR II 10MM

## (undated) DEVICE — ATTACHMENT DISTAL 4X13.4MM

## (undated) DEVICE — SNARE POLYP HEXAGONAL SM 13X2.4X240

## (undated) DEVICE — DRSG CURITY GAUZE SPONGE 4 X 4" 12-PLY NON-STERILE

## (undated) DEVICE — UNDERPAD LINEN SAVER 23 X 36"

## (undated) DEVICE — SYR IV FLUSH SALINE 10ML 30/TY